# Patient Record
Sex: MALE | NOT HISPANIC OR LATINO | Employment: FULL TIME | URBAN - METROPOLITAN AREA
[De-identification: names, ages, dates, MRNs, and addresses within clinical notes are randomized per-mention and may not be internally consistent; named-entity substitution may affect disease eponyms.]

---

## 2020-04-20 ENCOUNTER — TELEPHONE (OUTPATIENT)
Dept: OTHER | Facility: OUTPATIENT CENTER | Age: 25
End: 2020-04-20

## 2020-04-20 NOTE — TELEPHONE ENCOUNTER
[facility] Telephone Intake    Date:  2020  Client Name:  Kd Slater  Preferred Name: Jef    MRN:  [id number]   :  1995       Age:  24 year old     Presenting Problem / Reason for Assessment   (Clinical History &Symptoms):     Jef states he would like to work with a therapist to discuss gender dysphoria and gender exploration.     Suggested Program:  TG    Length of time experiencing Symptoms:  2-3 years     Seen Other Providers (if so, where): No  M.D. :    Therapist:    Psychiatrist:      Is presenting concern primarily sexual or mental health:  mental    Medications:     Prescribing Physician:      Diagnosis (if known):     Referral Source:  Online    Follow Up:    Insurance Benefits to be evaluated.  Note will be entered when validated.    Patient wishes to be contacted regarding Insurance benefits:  Yes    Please Verify Registration    Please send Welcome Packet and document date sent.

## 2020-04-21 NOTE — TELEPHONE ENCOUNTER
.Per: AUTOLINE w/ [employer]  Copay$ 0   Ded$ 0   Met$ 0   Coins 20%    Out of Pocket Max$ 6,970 ; Met$ 0     Psych testing require auth (13146/16894)? NO  Extended therapy require auth (44481)?  no    Exclusions:   Family Therapy (21108/25714)  NO    Marriage couple counseling  YES   Transgender/Gender Dysphoria no   CSB no   Sexual dysfunction no    Patient Contacted about benefits:  SPOKE TO PATIENT RE:INSU  Date contacted: 4/21/2020

## 2020-04-30 ENCOUNTER — VIRTUAL VISIT (OUTPATIENT)
Dept: OTHER | Facility: OUTPATIENT CENTER | Age: 25
End: 2020-04-30
Payer: COMMERCIAL

## 2020-04-30 DIAGNOSIS — F64.0 GENDER DYSPHORIA IN ADOLESCENT AND ADULT: Primary | ICD-10-CM

## 2020-04-30 NOTE — PROGRESS NOTES
Video start time: 3:05PM  Video end time: 3:55PM    Telemedicine Visit: The patient's condition can be safely assessed and treated via synchronous audio and visual telemedicine encounter.      Reason for Telemedicine Visit: Services only offered telehealth    Originating Site (Patient Location): Patient's home    Distant Site (Provider Location): Provider Remote Setting    Consent:  The patient/guardian has verbally consented to: the potential risks and benefits of telemedicine (video visit) versus in person care; bill my insurance or make self-payment for services provided; and responsibility for payment of non-covered services.     Mode of Communication:  Video Conference via [employer]    As the provider I attest to compliance with applicable laws and regulations related to telemedicine.        [facility]  Program in Human Sexuality  Department of Family Medicine & Community Health  [facility]   [address]               [place] [address]  Phone: [phone]  Fax: [phone]  [email or web address]    Transgender Diagnostic (TG) Diagnostic Assessment Interview  It is not required that you ask all questions or problems. Prioritize and set the most clinically relevant information in the time available.    Date of Service: 4/30/20  Client Name: Kd Slater  YOB: 1995  24 year old  MRN:  [id number]  Gender/Gender Identity: female/unsure  Treating Provider: Dwight Pete, Ph.D.,   Program: TG   Type of Session: Assessment  Present in Session: Dwight Fuchs  Number of Minutes:  50                       In most instances, Dx should be completed in 1 session with use of Comprehensive Intake Form    Ethnicity: multi-racial    Any relevant cultural/Yazidism issues? Client identified as  and noted the ways in which they have always felt marginalized or as other throughout their life.    Chief Complaint/Presenting  Problem and Goals:  Client reported that they are coming to counseling to learn more about themselves and explore if gender transitioning is right for them.      ________________________________________________________________    Transgender Health Services  Program-Specific Information    History of gender dysphoria   Has been working hard over the last five years to take care of themselves; first long-term relationship that has helped them start to learn about who they are and to recognize the dissonance they feel. Client reported that they have always felt low levels of incongruence, but they have been able to find some comfort in expressing themselves differenty, though at times that has felt like an overexaggeration.    Client described how they have never seen themselves as a boy and have always felt like an alien.    Body Image/Anatomical dysphoria:  Client reported that they are not sure about their experience of dysphoria at this time other than that it is clear to them that they do not feel like a man. Client is interested in further exploring their experience as it relates to their body and identity.    Social Supports:   Ex-girlfriend has been a major support as well as roommate, friends from high school. Client also discussed how happy they are in their job and that their coworkers have been very supportive.    Relevant Sexuality Information:  Client reported that they are not interested in dating right now because they want to understand themselves better and that is hard to do in a relationship. Client stated that they feel more comfortable in their sexuality now in a way that they were not for a long time. Client said that they now realize that they are more attracted to men and not attracted to women.    _________________________________________________________________________      Mental Health History:  Client has had some experiences with a counselor in the past after their father's death that  was helpful; help with making sure they were taking care of themselves (e.g., sleeping, eating, going to work, etc.).     Client said their mental health has been improving recently, and have been able to identify when they are starting to experience depression and being able to help prevent it from worsening. Client denied any suicide ideation or intent.      Substance Use:   Very limited alcohol consumption (1-2 drinks once per month), smokes marijuana about twice a week. Client reported that they try hard to ensure that they do not smoke in response to negative moods and only use it socially or for more spiritual experiences. Client drinks one thermos of coffee per day.    Medical History:  Client reported no significant medical history.    Relationships/Social History    Family History [where grew up, moves, parent divorce]:  Dad passed away about a year ago, and client noted that he struggled with alcohol addiction for much of his life. Client reported not having a relationship with him as a result of his addiction and related behaviors.    Client reported having a more difficult relationship with his mom until more recently. Client said that they now have a very close and strong relationship. Mom lives in [place], [place] and they have a chance to see her several times a year.  Brother lives down the street in [place] and client said that he is pretty close to him.      Educational History  Client completed two years of college before leaving.      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 Client said that they work with people they really enjoy and that it pays the bills.       ____________________________________________________    CONCLUSIONS    Strengths and Liabilities:   Client has a strong network of friends and family members who provide a lot of support to them.  Client is also clearly very insightful and aware of their experience.     Symptoms:  Client experiences ongoing concerns related to understanding their gender and how they can best feel comfortable in themselves. These feelings have persisted since childhood, and are consistent with a diagnosis of gender dysphoria.    Mental Status   Appearance:  No apparent distress, Casually dressed, Adequately groomed and Dressed appropriately for weather  Behavior/relationship to examiner/demeanor:  Cooperative, Engaged and Pleasant  Orientation: Oriented to person, place, time and situation  Speech Rate:  Normal  Speech Spontaneity:  Normal  Mood:  calm, friendly and comfortable  Affect:  Appropriate/mood-congruent  Thought Process (Associations):  Logical, Linear and Goal directed  Thought Content:  no evidence of suicidal or homicidal ideation  Abnormal Perception:  None  Attention/Concentration:  Normal  Language:  Intact  Insight:  Good  Judgment:  Good        DSM-5 Diagnosis:  F64.0 - gender dysphoria    Conclusions/Recommendations/Initial Treatment Goals: (The treatment plan should be developed from the assessment and in the chart the 1st session following the completion of the assessment.  It needs to be reviewed and signed by the patient and therapist).    The client is a 24 year old multi-racial person assigned male at birth who identifies as female or unsure at this time. Based on the client's current report of symptoms, client meets criteria for gender dysphoria. The client's mental health concerns affect client's ability to function socially and have been causing clinically significant distress. The client reports daily marijuana use and very limited alcohol use. The patient is also struggling with some complicated grief as a result of their father's recent death from addiction. Client reports/denies safety concerns. Based on the client's reported symptoms and impact on functioning, the plan for the patient  is:  1. Start supportive individual/family therapy with a provider specialized in gender. Therapy should focus on helping client understand themselves better.  2. Continue care/Establish care with a psychiatrist for medication management.   3. Consider ways of increasing client's social support through meeting other trans individuals.  4. Continue to assess the impact of client's family and relational patterns on their current well-being.     Dwight Pete, Ph.D., LP

## 2020-05-06 ENCOUNTER — VIRTUAL VISIT (OUTPATIENT)
Dept: OTHER | Facility: OUTPATIENT CENTER | Age: 25
End: 2020-05-06
Payer: COMMERCIAL

## 2020-05-06 DIAGNOSIS — F64.0 GENDER DYSPHORIA IN ADOLESCENT AND ADULT: Primary | ICD-10-CM

## 2020-05-06 NOTE — PROGRESS NOTES
TREATMENT PLAN    Date of Treatment Plan 2020  Name: Kd Slater  : 1995  Medical Record Number: [id number]  Treating Provider: Dwight Pete, Ph.D., LP  Type of Session: Individual  Present in Session: Dwight Fuchs      Current Status:    Depression/Mood:  Increase or Decrease in Appetite/Weight  Decreased Self-Esteem  Hopelessness/Helplessness  Dysphoria    Anxiety/Panic:  Worry  Intrusive Thoughts    Thought:   Reports no problems or symptoms at this time    Sensorium:  Reports no problems or symptoms at this time    Behavior/Health:  Reports no problems or symptoms at this time    Chemical Use:  Denies both Alcohol and Drug Abuse    Sexual Problems:  Reports no problems or symptoms at this time    Suicide Risk Assessment:  Assessed Level of Immediate Risk:  NO  Ideation:  NO  Plan:  NO  Means:  NOT APPLICABLE  Intent:  NO    Homicide Risk Assessment:  Assessed Level of Immediate Risk:  NO  Ideation:  NO  Plan:  NO  Means:  NOT APPLICABLE  Intent:  NO    Impact of Symptoms on Function:  Decreased Social/Family Function  Decreased Work Function    DSM-5 Diagnoses:   F64.0    Screening Questionnaires:  Please indicate whether the following screening questionnaires have been completed:  CAGE: No  PHQ-9: No    MICHELLE-7: No  Safety Screening: No  WHODAS: No  ** Screening questionnaires were not sent to client **    Problem(s):  1. Decreasing shame and self-pathogolization, self-loathing  2. Increasing self-acceptance  3. Increasing ability to talk abut this with family  4. Increasing daily coping strategies for managing moments of lower mood    Short-Long Term Goals  Decrease Symptoms  Increase Coping  Improve Communication  Enhance Relationships    Interventions  [facility]  Cognitive-Behavioral Therapy  Psychodynamic Therapy    Expected Outcomes and Prognosis  Return to normal functioning    Anticipated Treatment Duration:  One year    Frequency of Sessions  2 x / Month    Progress  Update (for Plan Update Only)  NA:  1st Treatment Planning    Care Coordination: No    Consent to Treatment:     Patient participated in this treatment planning process and indicated verbal agreement with the above treatment plan.    Patient Signature: [not signed]  Date: 5/06/20    If patient doesn't sign, indicate why: Telehealth services    Provider Signature: Dwight Pete, Ph.D.,   Date: 5/06/20

## 2020-05-06 NOTE — PROGRESS NOTES
Video start time: 9:04AM  Video end time: 9:55AM    Telemedicine Visit: The patient's condition can be safely assessed and treated via synchronous audio and visual telemedicine encounter.      Reason for Telemedicine Visit: Services only offered telehealth    Originating Site (Patient Location): Patient's home    Distant Site (Provider Location): Provider Remote Setting    Consent:  The patient/guardian has verbally consented to: the potential risks and benefits of telemedicine (video visit) versus in person care; bill my insurance or make self-payment for services provided; and responsibility for payment of non-covered services.     Mode of Communication:  Video Conference via [employer]    As the provider I attest to compliance with applicable laws and regulations related to telemedicine.      [facility] -  Case Progress Note    Date of Service: 20   Name: Kd Slater  : 1995  Medical Record Number: [id number]  Treating Provider: Dwight Pete, Ph.D.,   Type of Session: Individual  Present in Session: Dwight Fuchs  Number of Minutes:  51    Current Symptoms/Status:  Client is experiencing ongoing concerns related to gender identity exploration and dysphoria.     Progress Toward Treatment Goals:   We completed a treatment plan together and Client outlined their goals for counseling (see treatment plan). We focused on Client's first identified goal today of shifting what they notice as a dynamic for themselves of pathologizing their gender. Client described it as a feeling of having something be wrong about them. Client said that they also experience feeling like they deserve this bad thing that has happened to them. Client identified this feeling as resulting from messages about trans people in society, as well as cultural norms that exist within their family, particularly related to cultural aspects of Catholicism (e.g., feeling like they have some kind of innate sin or  wrongness about themselves).     Intervention: Modality and Description:  Cognitive strategies related to identifying cognitive frameworks for understanding gender were discussed, with us discussing dominant frameworks that are pervasive in society: sex is only male/female, gender identity that differs is wrong. We discussed ways Client could identify that framework when they notice it appearing for themselves and ways Client could work to shift that and adopt their own framework to work from.     Response to Intervention:  Client was open and engaged and responded readily to wanting to understand where the feeling they get regarding their gender being wrong comes from. Client was excited for the assignment they were given this week (see below).    Assignment:  Client was asked to reflect on the following question: What about your gender makes you happy?    DSM-5 Diagnoses:  F64.0    Plan/Need for Future Services:  Return for therapy in 1-2 weeks to treat diagnosed problems.      Dwight Pete, Ph.D., LP

## 2020-05-14 ENCOUNTER — VIRTUAL VISIT (OUTPATIENT)
Dept: OTHER | Facility: OUTPATIENT CENTER | Age: 25
End: 2020-05-14
Payer: COMMERCIAL

## 2020-05-14 DIAGNOSIS — F64.0 GENDER DYSPHORIA IN ADOLESCENT AND ADULT: Primary | ICD-10-CM

## 2020-05-14 NOTE — PROGRESS NOTES
"Video start time: 1:04PM  Video end time: 1:58PM    Telemedicine Visit: The patient's condition can be safely assessed and treated via synchronous audio and visual telemedicine encounter.      Reason for Telemedicine Visit: Services only offered telehealth    Originating Site (Patient Location): Patient's home    Distant Site (Provider Location): [facility]    Consent:  The patient/guardian has verbally consented to: the potential risks and benefits of telemedicine (video visit) versus in person care; bill my insurance or make self-payment for services provided; and responsibility for payment of non-covered services.     Mode of Communication:  Video Conference via [employer]    As the provider I attest to compliance with applicable laws and regulations related to telemedicine.        [facility] -  Case Progress Note    Date of Service: 20   Name: Kd Slater  : 1995  Medical Record Number: [id number]  Treating Provider: Dwight Pete, Ph.D.,   Type of Session: Individual  Present in Session: Dwight Fuchs  Number of Minutes:  54    Current Symptoms/Status:  Client reported ongoing gender dysphoria. Client also reported some heightened mood-related concerns and increased rumination over the past week.    Progress Toward Treatment Goals:   Client reported that they were initially excited to think about the questions that were posed during last session. However, Client noted that over the space of a few days when they were unable to \"make headway\" on answers to those questions, they found themselves very discouraged and said that they experienced a feeling of wanting to \"dig in their heels\" and not keep thinking about the questions. This led to a discussion where Client identified this is a common experience for them in other domains of their life and that they often become discouraged when they feel as though they aren't making more " progress toward accomplishing their goals.    We discussed ways Client could consider that their experience of not making headway had more to do with an intervention that they were not yet able to interact with, rather than an indication of their skills or abilities. Client expressed a feeling of unfairness in needing to face questions about how to be in the world as someone with a different gender identity. We discussed how racism and white supremacy and sexism and patriarchal systems operate in this society and the privilege that some have in not needing to ask those same questions or to reject the premise that these dynamics impact people and institutions.     We also discussed me leaving [facility] at the end of June.    Intervention: Modality and Description:  Cognitive strategies for decreasing rumination were discussed. We also discussed larger systems of racism/white supremacy and sexism/patriarchy as a way for Client to identify how to understand their experiences at this time.    Response to Intervention:  Client was open and engaged throughout, and appeared to feel less demoralized by the end of session.     Assignment:  None given at this time.    DSM-5 Diagnoses:  F64.0    Plan/Need for Future Services:  Return for therapy in 1-2 weeks to treat diagnosed problems.        Dwight Pete, Ph.D., LP

## 2020-05-21 ENCOUNTER — VIRTUAL VISIT (OUTPATIENT)
Dept: OTHER | Facility: OUTPATIENT CENTER | Age: 25
End: 2020-05-21
Payer: COMMERCIAL

## 2020-05-21 DIAGNOSIS — F64.0 GENDER DYSPHORIA IN ADOLESCENT AND ADULT: Primary | ICD-10-CM

## 2020-05-22 NOTE — PROGRESS NOTES
Video start time: 1:02PM  Video end time: 1:52PM    Telemedicine Visit: The patient's condition can be safely assessed and treated via synchronous audio and visual telemedicine encounter.      Reason for Telemedicine Visit: Patient has requested telehealth visit    Originating Site (Patient Location): Patient's home    Distant Site (Provider Location): [facility]    Consent:  The patient/guardian has verbally consented to: the potential risks and benefits of telemedicine (video visit) versus in person care; bill my insurance or make self-payment for services provided; and responsibility for payment of non-covered services.     Mode of Communication:  Video Conference via [employer]    As the provider I attest to compliance with applicable laws and regulations related to telemedicine.      [facility] -  Case Progress Note    Date of Service: 20   Name: Kd Slater  : 1995  Medical Record Number: [id number]  Treating Provider: Dwight Pete, Ph.D.,   Type of Session: Individual  Present in Session: Dwight Fuchs  Number of Minutes:  50    Current Symptoms/Status:  Client reported some ongoing gender dysphoria. Client reported less rumination and less distress this week compared to last.    Progress Toward Treatment Goals:   Client reported that they were able to stop ruminating on some of the same thoughts that they had been before. Client stated that recognized that they have feelings of guilt related to focusing on themselves, saying that they have an implicit association of focusing on themselves as being selfish. Client said that in realizing this, they have been able to feel more at peace about focusing more on their gender and other aspects of their life.    Intervention: Modality and Description:  Strategies for helping Client identify where early messages around focusing on themselves were employed. Client identified being capable  of helping others manage their emotions, particularly family members and so it has been hard to feel ok with taking time away for themselves. Client said that after their father  and after their brother was admitted to an inpatient recovery center, they realized how important it was for them to take time for themselves.    Response to Intervention:  Client was open and engaged throughout, and had a lot of insight into their own experience.    Assignment:  None given at this time.    DSM-5 Diagnoses:  F64.0    Plan/Need for Future Services:  Return for therapy in 1-2 weeks to treat diagnosed problems.        Dwight Pete, Ph.D., LP

## 2020-05-28 ENCOUNTER — VIRTUAL VISIT (OUTPATIENT)
Dept: OTHER | Facility: OUTPATIENT CENTER | Age: 25
End: 2020-05-28
Payer: COMMERCIAL

## 2020-05-28 DIAGNOSIS — F64.0 GENDER DYSPHORIA IN ADOLESCENT AND ADULT: Primary | ICD-10-CM

## 2020-05-29 NOTE — PROGRESS NOTES
Video start time: 1:03PM  Video end time: 1:54PM    Telemedicine Visit: The patient's condition can be safely assessed and treated via synchronous audio and visual telemedicine encounter.      Reason for Telemedicine Visit: Services only offered telehealth    Originating Site (Patient Location): Patient's home    Distant Site (Provider Location): [facility]: [facility]    Consent:  The patient/guardian has verbally consented to: the potential risks and benefits of telemedicine (video visit) versus in person care; bill my insurance or make self-payment for services provided; and responsibility for payment of non-covered services.     Mode of Communication:  Video Conference via [employer]    As the provider I attest to compliance with applicable laws and regulations related to telemedicine.      [facility] -  Case Progress Note    Date of Service: 20   Name: Kd Slater  : 1995  Medical Record Number: [id number]  Treating Provider: Dwight Pete, Ph.D.,   Type of Session: Individual  Present in Session: Dwight Fuchs  Number of Minutes:  51    Current Symptoms/Status:  Client reported ongoing concerns and questions related to their gender and general mental health.    Progress Toward Treatment Goals:   Client reported that in the past week they found themselves continuing to ask themselves important questions about their mental health and their gender but eventually it began to feel overwhelming and was bringing down their mood. Client described compartmentalizing these questions and feelings, and instead concentrating on doing what they needed to in starting back up with work.    Intervention: Modality and Description:  We identified together Client's healthy ability to respond to what they needed in the moment by compartmentalizing. We also employed some guided imagery for healthy compartmentalization in the future, so that Client could feel as  though they had an increased sense of control over how and when they compartmentalize.     Response to Intervention:  Client identified a visual image of small red jewelry box as the container that they would want to use for compartmentalizing. Client noted that if they put it away, they might never take it out, so it might be something they keep on their desk, just partially open so that they know it's there when they feel ready to take it out and look at it again. Client was also able to readily respond to the idea that in compartmentalizing for themselves, they were still taking care of themselves and pushing forward with their goals by attending more to self-care.    Assignment:  None given at this time.    DSM-5 Diagnoses:  F64.0    Plan/Need for Future Services:  Return for therapy in 1-2 weeks to treat diagnosed problems.      Dwight Pete, Ph.D., LP

## 2020-06-04 ENCOUNTER — VIRTUAL VISIT (OUTPATIENT)
Dept: OTHER | Facility: OUTPATIENT CENTER | Age: 25
End: 2020-06-04
Payer: COMMERCIAL

## 2020-06-04 DIAGNOSIS — F64.0 GENDER DYSPHORIA IN ADOLESCENT AND ADULT: Primary | ICD-10-CM

## 2020-06-05 NOTE — PROGRESS NOTES
Video start time: 1:32PM  Video end time: 2:27PM    Telemedicine Visit: The patient's condition can be safely assessed and treated via synchronous audio and visual telemedicine encounter.      Reason for Telemedicine Visit: Services only offered telehealth    Originating Site (Patient Location): Patient's home    Distant Site (Provider Location): [facility]    Consent:  The patient/guardian has verbally consented to: the potential risks and benefits of telemedicine (video visit) versus in person care; bill my insurance or make self-payment for services provided; and responsibility for payment of non-covered services.     Mode of Communication:  Video Conference via [employer]    As the provider I attest to compliance with applicable laws and regulations related to telemedicine.      [facility] Sexual Health -  Case Progress Note    Date of Service: 20   Name: Kd Slater  : 1995  Medical Record Number: [id number]  Treating Provider: Dwight Pete, Ph.D.,   Type of Session: Individual  Present in Session: Dwight Fuchs  Number of Minutes:  55    Current Symptoms/Status:  Client continues to experience ongoing questions and concerns related to their gender. Client has also been experiencing an increase in overall distress from the previous week as a result of Zbigniew Aguirre's killing and protests.    Progress Toward Treatment Goals:   Client reported that they joined several protests over the past week and that it brought up a lot of emotions for them: excitement (about joining into a larger effort to call for reform), sorrow, pain, exhaustion. Client also discussed their efforts in the past to be engaged in the community, but this experience has helped them realize that they want to find a way to do that in a more impactful and longer-term way. Client also described feeling a need to take a step back from protesting to rest and recover, though described  "that as bringing up a lot of guilt for themselves.    Intervention: Modality and Description:  We discussed ways Client could shift the sense of guilt they were experiencing in needing to rest after an intense week. We also unpacked messages regarding the what it means to \"rest\", and how societal messages about resting for POC are rooted in white supremacy.     Response to Intervention:  Client was able to connect with the importance of resting for themselves, giving space for themselves to take care of themselves and give themselves what they need. In the moment, Client appeared to really connect with a feeling that they matter as a person and are worthy of having their needs met.    Assignment:  None given at this time.    DSM-5 Diagnoses:  F64.0    Plan/Need for Future Services:  Return for therapy in 1-2 weeks to treat diagnosed problems.      Dwight Pete, Ph.D., LP    "

## 2020-06-11 ENCOUNTER — VIRTUAL VISIT (OUTPATIENT)
Dept: OTHER | Facility: OUTPATIENT CENTER | Age: 25
End: 2020-06-11
Payer: COMMERCIAL

## 2020-06-11 DIAGNOSIS — F64.0 GENDER DYSPHORIA IN ADOLESCENT AND ADULT: Primary | ICD-10-CM

## 2020-06-11 NOTE — PROGRESS NOTES
Video start time: 1:02PM  Video end time: 1:55PM    Telemedicine Visit: The patient's condition can be safely assessed and treated via synchronous audio and visual telemedicine encounter.      Reason for Telemedicine Visit: Services only offered telehealth    Originating Site (Patient Location): Patient's home    Distant Site (Provider Location): [facility]: center for sexual [facility]    Consent:  The patient/guardian has verbally consented to: the potential risks and benefits of telemedicine (video visit) versus in person care; bill my insurance or make self-payment for services provided; and responsibility for payment of non-covered services.     Mode of Communication:  Video Conference via [employer]    As the provider I attest to compliance with applicable laws and regulations related to telemedicine.      [facility] Sexual Health -  Case Progress Note    Date of Service: 20   Name: Kd Slater  : 1995  Medical Record Number: [id number]  Treating Provider: Dwight Pete, Ph.D.,   Type of Session: Individual  Present in Session: Dwight Fuhcs  Number of Minutes:  53    Current Symptoms/Status:  Client reported ongoing concerns related to gender. Client also expressed some questions regarding understanding their own needs and desires.    Progress Toward Treatment Goals:   Client reported that they have gone back to work full time in the last week and that it has been a difficult transition back. Client said that they feel as though they have experienced a lot of highs and lows, but that a lot of their highs have been learning from their lows. Client identified feeling progress from last week in recognizing that they have needs and desires and that they have found themselves really thinking about what they want in the long-term for themselves beyond just surviving.     Intervention: Modality and Description:  We discussed the concept of thriving and Client identified that they  "have long held a perspective on thriving as being defined externally in notions about success. We discussed the ways client is rewriting for themselves what success means and how they are defining that from within, rather than internalizing others' definitions.    Response to Intervention:  Client identified that the process of learning and growing and changing is what they consider part of their success. Client expressed wanting to continue to find that from within.    Assignment:  None given at this time.    DSM-5 Diagnoses:  F64.0    Plan/Need for Future Services:  Return for therapy in 1 week to treat diagnosed problems.  Discuss with client who their \"role models\" are for success. What are the ingredients for success.    Dwight Pete, Ph.D., LP    "

## 2020-06-25 ENCOUNTER — VIRTUAL VISIT (OUTPATIENT)
Dept: OTHER | Facility: OUTPATIENT CENTER | Age: 25
End: 2020-06-25
Payer: COMMERCIAL

## 2020-06-25 DIAGNOSIS — F64.0 GENDER DYSPHORIA IN ADOLESCENT AND ADULT: Primary | ICD-10-CM

## 2020-06-29 NOTE — PROGRESS NOTES
Video start time: 1:03PM  Video end time: 1:58PM    Telemedicine Visit: The patient's condition can be safely assessed and treated via synchronous audio and visual telemedicine encounter.      Reason for Telemedicine Visit: Services only offered telehealth    Originating Site (Patient Location): Patient's home    Distant Site (Provider Location): [facility]: [facility]    Consent:  The patient/guardian has verbally consented to: the potential risks and benefits of telemedicine (video visit) versus in person care; bill my insurance or make self-payment for services provided; and responsibility for payment of non-covered services.     Mode of Communication:  Video Conference via [employer]    As the provider I attest to compliance with applicable laws and regulations related to telemedicine.      [facility] Sexual Health -  Case Progress Note    Date of Service: 20   Name: Kd Slater  : 1995  Medical Record Number: [id number]  Treating Provider: Dwight Pete, Ph.D.,   Type of Session: Individual  Present in Session: Dwight Fuchs  Number of Minutes:  55    Current Symptoms/Status:  Client reported ongoing concerns related to gender and to general mood.    Progress Toward Treatment Goals:   Client reported that they have not spent a lot of time thinking about some of the bigger questions they have been wrestling with as recently. Client noted that it has been a helpful kind of break for themselves. Client also noted that they have experienced a profound shift in how they are being active in taking care of themselves right now, which Client has noted is due to feeling like they are worth taking care of. Client expressed this as a new feeling for themselves, and how meaningful it is to connect with it.     Intervention: Modality and Description:  We also discussed underlying messages Client has internalized over time about their work or their efforts not being  "enough. We discussed what it would be like for Client to take a moment to celebrate \"small\" steps they take for themselves, and Client identified what a big shift that represents for themselves. Client noted that even when they were getting straight As in school, it didn't feel like it was worth celebrating because they felt like it wasn't a big enough success for themselves.    Response to Intervention:  Client was able to identify during session that they want to be able to celebrate the successes they experience and to see themselves in a different light. Client said they will set aside some time for themselves to celebrate any steps they take toward growth for themselves because they believe it is important to see themselves as worth celebrating.    Assignment:  None given at this time.    DSM-5 Diagnoses:  F64.0    Plan/Need for Future Services:  Return for therapy in 1-2 weeks to treat diagnosed problems.      Dwight Pete, Ph.D., LP    "

## 2020-07-02 ENCOUNTER — VIRTUAL VISIT (OUTPATIENT)
Dept: OTHER | Facility: OUTPATIENT CENTER | Age: 25
End: 2020-07-02
Payer: COMMERCIAL

## 2020-07-02 DIAGNOSIS — F64.0 GENDER DYSPHORIA IN ADOLESCENT AND ADULT: Primary | ICD-10-CM

## 2020-07-02 NOTE — PROGRESS NOTES
Video start time: 1:03PM  Video end time: 1:58PM    Telemedicine Visit: The patient's condition can be safely assessed and treated via synchronous audio and visual telemedicine encounter.      Reason for Telemedicine Visit: Services only offered telehealth    Originating Site (Patient Location): Patient's home    Distant Site (Provider Location): [facility]: [facility]    Consent:  The patient/guardian has verbally consented to: the potential risks and benefits of telemedicine (video visit) versus in person care; bill my insurance or make self-payment for services provided; and responsibility for payment of non-covered services.     Mode of Communication:  Video Conference via [employer]    As the provider I attest to compliance with applicable laws and regulations related to telemedicine.      [facility] Sexual Health -  Case Progress Note    Date of Service: 20   Name: Kd Slater  : 1995  Medical Record Number: [id number]  Treating Provider:Tenisha Stewart PsyD, LMFT  Type of Session: Individual  Present in Session: Dwight Fuchs  Number of Minutes:  55    Current Symptoms/Status:  Client reported ongoing concerns related to gender and to general mood.    Progress Toward Treatment Goals:   Client reported that they have not spent a lot of time thinking about some of the bigger questions they have been wrestling with as recently. Client noted that it has been a helpful kind of break for themselves. Client also noted that they have experienced a profound shift in how they are being active in taking care of themselves right now, which Client has noted is due to feeling like they are worth taking care of. Client expressed this as a new feeling for themselves, and how meaningful it is to connect with it.     Intervention: Modality and Description:  Therapist focused on joining and creating an alliance with the client.  Utilized interpersonal, family systems, and narrative  therapy techniques to learn about clients history, presenting concerns, and therapeutic needs.     Response to Intervention:  Client was able to identify during session that they want to be able to celebrate the successes they experience and to see themselves in a different light. Client said they will set aside some time for themselves to celebrate any steps they take toward growth for themselves because they believe it is important to see themselves as worth celebrating.    Assignment:  None given at this time.    DSM-5 Diagnoses:  F64.0    Plan/Need for Future Services:  Return for therapy in 1-2 weeks to treat diagnosed problems.      Tenisha Stewart PsyD, LMFT

## 2020-07-09 ENCOUNTER — VIRTUAL VISIT (OUTPATIENT)
Dept: OTHER | Facility: OUTPATIENT CENTER | Age: 25
End: 2020-07-09
Payer: COMMERCIAL

## 2020-07-09 DIAGNOSIS — F64.0 GENDER DYSPHORIA IN ADOLESCENT AND ADULT: Primary | ICD-10-CM

## 2020-07-09 NOTE — PROGRESS NOTES
"Video start time: 2:34PM  Video end time: 3:25 PM    Telemedicine Visit: The patient's condition can be safely assessed and treated via synchronous audio and visual telemedicine encounter.      Reason for Telemedicine Visit: Services only offered telehealth    Originating Site (Patient Location): Patient's home    Distant Site (Provider Location): [facility]: [facility]    Consent:  The patient/guardian has verbally consented to: the potential risks and benefits of telemedicine (video visit) versus in person care; bill my insurance or make self-payment for services provided; and responsibility for payment of non-covered services.     Mode of Communication:  Video Conference via [employer]    As the provider I attest to compliance with applicable laws and regulations related to telemedicine.      [facility] Sexual Health -  Case Progress Note    Date of Service: 20   Name: Kd Slater  : 1995  Medical Record Number: [id number]  Treating Provider:Tenisha Stewart PsyD, LMFT  Type of Session: Individual  Present in Session: Tenisha Fuchs  Number of Minutes:  51     Current Symptoms/Status:  Client reported ongoing concerns related to gender and to general mood.    Progress Toward Treatment Goals:   Client reported that they have begun to \"like\" themselves and lean into affirming gender exploration. Client expressed this as a new feeling for themselves, and how meaningful it is to connect with it.     Intervention: Modality and Description:  Therapist focused on joining and creating an alliance with the client.Utilized interpersonal, family systems, and narrative therapy techniques to explore clients, history of presenting problem, and current perceived stressors. Continued to explore goals for therapy, including social transition, ways to feel affirmed in gener expression and cope with external stressors.     Sleep, diet, exercise, social engagement, what gets in the way of " "focusing on areas of self-care?   -   Explored impact of Zbigniew Aguirre murder, police brutality, etc... and dismissal of feelings, experiences as mixed-race person and not feeling like they \"are black enough/permission to be black \" to speak out and be active in the cause. Client reports feeling guilty for having opportunities that other black folks don't.     Client reported entire life of feeling like a \"house slave\" so many experiences they are afforded are because they are the \"whitest black person.\"     Response to Intervention:  Engaged and active.    Assignment:  None given at this time.    DSM-5 Diagnoses:  F64.0    Plan/Need for Future Services:  Return for therapy in 1-2 weeks to treat diagnosed problems.      Tenisha Stewart PsyD, LMFT    "

## 2020-07-16 ENCOUNTER — VIRTUAL VISIT (OUTPATIENT)
Dept: OTHER | Facility: OUTPATIENT CENTER | Age: 25
End: 2020-07-16
Payer: COMMERCIAL

## 2020-07-16 DIAGNOSIS — F64.0 GENDER DYSPHORIA IN ADOLESCENT AND ADULT: Primary | ICD-10-CM

## 2020-07-16 NOTE — PROGRESS NOTES
"Video start time: 3:32PM  Video end time: 4:25 PM    Telemedicine Visit: The patient's condition can be safely assessed and treated via synchronous audio and visual telemedicine encounter.      Reason for Telemedicine Visit: Services only offered telehealth    Originating Site (Patient Location): Patient's home    Distant Site (Provider Location): [facility]: [facility]    Consent:  The patient/guardian has verbally consented to: the potential risks and benefits of telemedicine (video visit) versus in person care; bill my insurance or make self-payment for services provided; and responsibility for payment of non-covered services.     Mode of Communication:  Video Conference via [employer]    As the provider I attest to compliance with applicable laws and regulations related to telemedicine.      [facility] Sexual Health -  Case Progress Note    Date of Service: 20   Name: Kd Slater  : 1995  Medical Record Number: [id number]  Treating Provider:Tenisha Stewart PsyD, LMFT  Type of Session: Individual  Present in Session: Tenisha Fuchs  Number of Minutes:  53    Current Symptoms/Status:  Client reported ongoing concerns related to gender and to general mood.    Progress Toward Treatment Goals:   Client reported that they have begun to \"like\" themselves and lean into affirming gender exploration. Client expressed this as a new feeling for themselves, and how meaningful it is to connect with it.     Intervention: Modality and Description:  Therapist focused on joining and creating an alliance with the client.Utilized interpersonal, family systems, and narrative therapy techniques to explore clients, history of presenting problem, and current perceived stressors. Continued to explore goals for therapy, including social transition, ways to feel affirmed in gener expression and cope with external stressors.     Waking up early 5-6am, bad sleep (waking up in physical pain), but " "gained time to ease into the day.     Saw aunts- 2-7 years old lots of anger problems, very traumatic environment couldn't understand (parents divorce, dad in and out of hospital due to drug addiction/mental illness). Conscious decision at age 9 or 10 to \"put bad Kd under the bed.\" Explored client reactions to being consistently told they were \"bad.\"    Response to having negative feelings is physical, or being dismissed or shut down. Comments about food/weight, conscious decision to poke fun at wounds within paternal side of family (bully you based on what's important to you)- vulnerable narcissists, lack self-efficacy.    Brother- very close, very supportive, but may not be aware and open-minded around gender.    - if I am good at something then I can love myself  - talent is its own expectation, I get attention and validation from doing something well.   -Explored roots of self-loathing in family of origin.    Response to Intervention:  Engaged and active.    Assignment:  None given at this time.    DSM-5 Diagnoses:  F64.0    Plan/Need for Future Services:  Return for therapy in 1-2 weeks to treat diagnosed problems.      Tenisha Stewart PsyD, LMFT  "

## 2020-07-23 ENCOUNTER — VIRTUAL VISIT (OUTPATIENT)
Dept: OTHER | Facility: OUTPATIENT CENTER | Age: 25
End: 2020-07-23
Payer: COMMERCIAL

## 2020-07-23 DIAGNOSIS — F64.0 GENDER DYSPHORIA IN ADOLESCENT AND ADULT: Primary | ICD-10-CM

## 2020-07-23 NOTE — PROGRESS NOTES
"Video start time: 3:32PM  Video end time: 4:25 PM    Telemedicine Visit: The patient's condition can be safely assessed and treated via synchronous audio and visual telemedicine encounter.      Reason for Telemedicine Visit: Services only offered telehealth    Originating Site (Patient Location): Patient's home    Distant Site (Provider Location): [facility]: [facility]    Consent:  The patient/guardian has verbally consented to: the potential risks and benefits of telemedicine (video visit) versus in person care; bill my insurance or make self-payment for services provided; and responsibility for payment of non-covered services.     Mode of Communication:  Video Conference via [employer]    As the provider I attest to compliance with applicable laws and regulations related to telemedicine.      [facility] Sexual Health -  Case Progress Note    Date of Service: 20   Name: Kd Slater  : 1995  Medical Record Number: [id number]  Treating Provider:Tenisha Stewart PsyD, LMFT  Type of Session: Individual  Present in Session: Tenisha Fuchs  Number of Minutes:  55    Current Symptoms/Status:  Client reported ongoing concerns related to gender and to general mood.    Progress Toward Treatment Goals:   Client reported that they have begun to \"like\" themselves and lean into affirming gender exploration. Client expressed this as a new feeling for themselves, and how meaningful it is to connect with it.     Intervention: Modality and Description:  Therapist focused on joining and creating an alliance with the client.Utilized interpersonal, family systems, and narrative therapy techniques to explore clients, history of presenting problem, and current perceived stressors. Continued to explore goals for therapy, including social transition, ways to feel affirmed in gener expression and cope with external stressors.     Self-sabotaging, emphasis on gender has been a part of this (overly stressed " and obsessed about it). Client reports that they have a crush on a man (friend Trevon) for the first time and its very exciting.     Client shared impressions of crush, sexual orientation, and decreased stress related to gender. Focused on disconnect between uncertainty and instability and search for understanding what's next.    Response to Intervention:  Engaged and active.    Assignment:  None given at this time.    DSM-5 Diagnoses:  F64.0    Plan/Need for Future Services:  Return for therapy in 1-2 weeks to treat diagnosed problems.      Tenisha Stewart PsyD, LMFT

## 2020-07-30 ENCOUNTER — VIRTUAL VISIT (OUTPATIENT)
Dept: OTHER | Facility: OUTPATIENT CENTER | Age: 25
End: 2020-07-30
Payer: COMMERCIAL

## 2020-07-30 DIAGNOSIS — F64.0 GENDER DYSPHORIA IN ADOLESCENT AND ADULT: Primary | ICD-10-CM

## 2020-07-30 NOTE — PROGRESS NOTES
"Video start time: 3:33PM  Video end time: 4:30 PM    Telemedicine Visit: The patient's condition can be safely assessed and treated via synchronous audio and visual telemedicine encounter.      Reason for Telemedicine Visit: Services only offered telehealth    Originating Site (Patient Location): Patient's home    Distant Site (Provider Location): [facility]: [facility]    Consent:  The patient/guardian has verbally consented to: the potential risks and benefits of telemedicine (video visit) versus in person care; bill my insurance or make self-payment for services provided; and responsibility for payment of non-covered services.     Mode of Communication:  Video Conference via [employer]    As the provider I attest to compliance with applicable laws and regulations related to telemedicine.      [facility] Sexual Health -  Case Progress Note    Date of Service: 20   Name: Kd Slater  : 1995  Medical Record Number: [id number]  Treating Provider:Tenisha Stewart PsyD, LMFT  Type of Session: Individual  Present in Session: Tenisha Fuchs  Number of Minutes:  57    Current Symptoms/Status:  Client reported ongoing concerns related to gender and to general mood.    Progress Toward Treatment Goals:   Client reported that they have begun to \"like\" themselves and lean into affirming gender exploration. Client expressed this as a new feeling for themselves, and how meaningful it is to connect with it.     Intervention: Modality and Description:  Therapist focused on joining and creating an alliance with the client.Utilized interpersonal, family systems, and narrative therapy techniques to explore clients, history of presenting problem, and current perceived stressors. Continued to explore goals for therapy, including social transition, ways to feel affirmed in gener expression and cope with external stressors.     Found framework where gender and self-exploration was less " "stressful, non-binary.    \"because I so badly don't want to be trans.\" self-sabotage, fear \"Don't....  I'm going to get hurt, people are going to hurt me, its stupid, i'm afraid o    I don't want to change my body, I don't want to be treated differently, I don't want to be at risk of being attacked, but I also don't want to not be myself.     Shame. Feel like a bad person for wanting to be a woman. Back to self-sabotage. Wants to be seen as a caring loving person, got to a point where they wanted to go on hormones.    Explored clients foray into makeup, trying things and embracing it as a hobby, processed feelings of being attuned to self with makeup on.    Friend who's parents are very transphobic, friend is transgender.     Response to Intervention:  Engaged and active.    Assignment:  None given at this time.    DSM-5 Diagnoses:  F64.0    Plan/Need for Future Services:  Return for therapy in 1-2 weeks to treat diagnosed problems.      Tenisha Stewart PsyD, LMFT  "

## 2020-08-06 ENCOUNTER — VIRTUAL VISIT (OUTPATIENT)
Dept: OTHER | Facility: OUTPATIENT CENTER | Age: 25
End: 2020-08-06
Payer: COMMERCIAL

## 2020-08-06 DIAGNOSIS — F64.0 GENDER DYSPHORIA IN ADOLESCENT AND ADULT: Primary | ICD-10-CM

## 2020-08-06 NOTE — PROGRESS NOTES
"Video start time: 3:33PM  Video end time: 4:28PM    Telemedicine Visit: The patient's condition can be safely assessed and treated via synchronous audio and visual telemedicine encounter.      Reason for Telemedicine Visit: Services only offered telehealth    Originating Site (Patient Location): Patient's home    Distant Site (Provider Location): [facility]    Consent:  The patient/guardian has verbally consented to: the potential risks and benefits of telemedicine (video visit) versus in person care; bill my insurance or make self-payment for services provided; and responsibility for payment of non-covered services.     Mode of Communication:  Video Conference via Doxy    As the provider I attest to compliance with applicable laws and regulations related to telemedicine.      [facility] -  Case Progress Note    Date of Service: 20   Name: Kd Slater  : 1995  Medical Record Number: [id number]  Treating Provider:Tenisha Stewart PsyD, LMFT  Type of Session: Individual  Present in Session: Tenisha Fuchs  Number of Minutes:  55    Current Symptoms/Status:  Client reported ongoing concerns related to gender and to general mood.    Progress Toward Treatment Goals:   Client reported that they have begun to \"like\" themselves and lean into affirming gender exploration. Client expressed this as a new feeling for themselves, and how meaningful it is to connect with it.     Intervention: Modality and Description:  Therapist focused on joining and creating an alliance with the client.Utilized interpersonal, family systems, and narrative therapy techniques to explore clients, history of presenting problem, and current perceived stressors. Continued to explore goals for therapy, including social transition, ways to feel affirmed in gener expression and cope with external stressors.         Response to Intervention  Continued to focus on anxiety- don't let people " "get too close, don't be completely myself, I would get hurt. Therapist reflected re-occuring theme of fear of being hurt.     8th grade, cut dad off, not going to have relationship (view of people are disposable). Client reported that at the time they didn't see other options- \"like the .\" Explored client's fathers' cycle of alcoholism and impact of inconsistency on childhood and learned messaging around fear, blame.     Assignment:  None given at this time.    DSM-5 Diagnoses:  F64.0    Plan/Need for Future Services:  Return for therapy in 1-2 weeks to treat diagnosed problems.      Tenisha Stewart PsyD, LMFT  "

## 2020-08-12 ENCOUNTER — VIRTUAL VISIT (OUTPATIENT)
Dept: OTHER | Facility: OUTPATIENT CENTER | Age: 25
End: 2020-08-12
Payer: COMMERCIAL

## 2020-08-12 DIAGNOSIS — F64.0 GENDER DYSPHORIA IN ADOLESCENT AND ADULT: Primary | ICD-10-CM

## 2020-08-12 NOTE — PROGRESS NOTES
"Video start time: 3:03PM  Video end time: 4:00PM    Telemedicine Visit: The patient's condition can be safely assessed and treated via synchronous audio and visual telemedicine encounter.      Reason for Telemedicine Visit: Services only offered telehealth    Originating Site (Patient Location): Patient's home    Distant Site (Provider Location): [facility]: [facility]    Consent:  The patient/guardian has verbally consented to: the potential risks and benefits of telemedicine (video visit) versus in person care; bill my insurance or make self-payment for services provided; and responsibility for payment of non-covered services.     Mode of Communication:  Video Conference via Thrive[employer]    As the provider I attest to compliance with applicable laws and regulations related to telemedicine.      [facility] -  Case Progress Note    Date of Service: 20   Name: Kd Slater  : 1995  Medical Record Number: [id number]  Treating Provider:Tenisha Stewart PsyD, LMFT  Type of Session: Individual  Present in Session: Tenisha Fuchs  Number of Minutes:  57    Current Symptoms/Status:  Client reported ongoing concerns related to gender and to general mood.    Progress Toward Treatment Goals:   Client reported that they have begun to \"like\" themselves and lean into affirming gender exploration. Client expressed this as a new feeling for themselves, and how meaningful it is to connect with it.     Intervention: Modality and Description:  Therapist focused on joining and creating an alliance with the client.Utilized interpersonal, family systems, and narrative therapy techniques to explore clients, history of presenting problem, and current perceived stressors. Continued to explore goals for therapy, including social transition, ways to feel affirmed in gener expression and cope with external stressors.     Explored client experience of feeling ashamed or humiliated by body, " including male genitalia, how client would want to engage in sex is incongruent because of genitalia.    Explored difference between body image concerns and anatomical dysphoria, fears of vanity, narrative of transitioning because they feel ugly. Forehead (big), facial hair, body hair, don't have hips/figure.    Longer hair  Breasts- to a degree  Hips  Not male genitals  Less body hair    Processed line of thought:   Being trans is bad, therefore I am a bad person. (political state, healthcare, erasure, violence toward, microaggressions), and imposter syndrome     Explored variability in gender identity and presentation, self-loathing around being trans or not feeling okay with ways that friends are trans. Discussed logistics of HT and how to begin process. Therapist validated clients gender dysphoria.     Plan/Need for Future Services:    Response to Intervention:  Engaged and active.    Assignment:  None given at this time.    DSM-5 Diagnoses:  F64.0    Return for therapy in 1-2 weeks to treat diagnosed problems.      Tenisha Stewart PsyD, LMFT

## 2020-08-27 ENCOUNTER — VIRTUAL VISIT (OUTPATIENT)
Dept: OTHER | Facility: OUTPATIENT CENTER | Age: 25
End: 2020-08-27
Payer: COMMERCIAL

## 2020-08-27 DIAGNOSIS — F64.0 GENDER DYSPHORIA IN ADOLESCENT AND ADULT: Primary | ICD-10-CM

## 2020-08-27 NOTE — PROGRESS NOTES
"Video start time: 3:33PM  Video end time: 4:30PM    Telemedicine Visit: The patient's condition can be safely assessed and treated via synchronous audio and visual telemedicine encounter.      Reason for Telemedicine Visit: Services only offered telehealth    Originating Site (Patient Location): Patient's home    Distant Site (Provider Location): [facility]: [facility]    Consent:  The patient/guardian has verbally consented to: the potential risks and benefits of telemedicine (video visit) versus in person care; bill my insurance or make self-payment for services provided; and responsibility for payment of non-covered services.     Mode of Communication:  Video Conference via [employer]ource    As the provider I attest to compliance with applicable laws and regulations related to telemedicine.      [facility] -  Case Progress Note    Date of Service: 20   Name: Kd Slater  : 1995  Medical Record Number: [id number]  Treating Provider:Tenisha Stewart PsyD, LMFT  Type of Session: Individual  Present in Session: Tenisha Fuchs  Number of Minutes:  57    Current Symptoms/Status:  Client reported ongoing concerns related to gender and to general mood.    Progress Toward Treatment Goals:   Client reported that they have begun to \"like\" themselves and lean into affirming gender exploration. Client expressed this as a new feeling for themselves, and how meaningful it is to connect with it.     Intervention: Modality and Description:  Therapist focused on joining and creating an alliance with the client.Utilized interpersonal, family systems, and narrative therapy techniques to explore clients, history of presenting problem, and current perceived stressors. Continued to explore dysphoria and ways to feel congruence in experience. Spent session reading through informed consent for feminizing hormones and processing questions/reactions.       Plan/Need for Future " Services:  Refer to Dr. Redmond for hormone consultation.    Response to Intervention:  Engaged and active.    Assignment:  None given at this time.    DSM-5 Diagnoses:  F64.0    Return for therapy in 1-2 weeks to treat diagnosed problems.      Tenisha Stewart PsyD, ALISSAFT

## 2020-08-31 ENCOUNTER — TELEPHONE (OUTPATIENT)
Dept: OTHER | Facility: OUTPATIENT CENTER | Age: 25
End: 2020-08-31

## 2020-09-03 ENCOUNTER — VIRTUAL VISIT (OUTPATIENT)
Dept: OTHER | Facility: OUTPATIENT CENTER | Age: 25
End: 2020-09-03
Payer: COMMERCIAL

## 2020-09-03 DIAGNOSIS — F64.0 GENDER DYSPHORIA IN ADOLESCENT AND ADULT: Primary | ICD-10-CM

## 2020-09-03 NOTE — PROGRESS NOTES
"Video start time: 4:00 PM  Video end time: 5:00    Telemedicine Visit: The patient's condition can be safely assessed and treated via synchronous audio and visual telemedicine encounter.      Reason for Telemedicine Visit: Services only offered telehealth    Originating Site (Patient Location): Patient's home    Distant Site (Provider Location): [facility]: [facility]    Consent:  The patient/guardian has verbally consented to: the potential risks and benefits of telemedicine (video visit) versus in person care; bill my insurance or make self-payment for services provided; and responsibility for payment of non-covered services.     Mode of Communication:  Video Conference via [employer]    As the provider I attest to compliance with applicable laws and regulations related to telemedicine.      [facility] -  Case Progress Note    Date of Service: 20   Name: Kd Slater  : 1995  Medical Record Number: [id number]  Treating Provider:Tenisha Stewart PsyD, LMFT  Type of Session: Individual  Present in Session: Tenisha Fuchs  Number of Minutes:  60    Current Symptoms/Status:  Client reported ongoing concerns related to gender and to general mood.    Progress Toward Treatment Goals:   Client reported that they have begun to \"like\" themselves and lean into affirming gender exploration. Client expressed this as a new feeling for themselves, and how meaningful it is to connect with it.     Scheduled consultation with Dr. Redmond for HRT.    Intervention: Modality and Description:  Therapist focused on joining and creating an alliance with the client.Utilized interpersonal, family systems, and narrative therapy techniques to explore clients, history of presenting problem, and current perceived stressors.     Explored mixed emotions related to moving forward with transition, discomfort, overwhelm. Explored doing versus being.          Response to Intervention:    Harder " "time with weekends, lack of routine, easier to get in head. Explored self-imposed boundaries around \"chill\" and self-care (make-up, painting, making music)    \" I don't want to be trans.\" I am so used to having a protective shell- if people see me for me, terrifying. Afraid to show true self, don't know what would happen.    Part of self that is still disliked- even though I love myself. Ashamed of where I've come from, the things that make me, me.       Plan/Need for Future Services:    Assignment:  None given at this time.    DSM-5 Diagnoses:  F64.0    Return for therapy in 1-2 weeks to treat diagnosed problems.      Tenisha Stewart PsyD, LMFT  "

## 2020-09-10 ENCOUNTER — VIRTUAL VISIT (OUTPATIENT)
Dept: OTHER | Facility: OUTPATIENT CENTER | Age: 25
End: 2020-09-10
Payer: COMMERCIAL

## 2020-09-10 DIAGNOSIS — F64.0 GENDER DYSPHORIA IN ADOLESCENT AND ADULT: Primary | ICD-10-CM

## 2020-09-10 NOTE — PROGRESS NOTES
"Video start time: 4:36 PM  Video end time: 5:30 PM    Telemedicine Visit: The patient's condition can be safely assessed and treated via synchronous audio and visual telemedicine encounter.      Reason for Telemedicine Visit: Services only offered telehealth    Originating Site (Patient Location): Patient's home    Distant Site (Provider Location): [facility]: [facility]    Consent:  The patient/guardian has verbally consented to: the potential risks and benefits of telemedicine (video visit) versus in person care; bill my insurance or make self-payment for services provided; and responsibility for payment of non-covered services.     Mode of Communication:  Video Conference via [employer]    As the provider I attest to compliance with applicable laws and regulations related to telemedicine.      [facility] -  Case Progress Note    Date of Service: 9/10/20   Name: Kd Slater  : 1995  Medical Record Number: [id number]  Treating Provider:Tenisha Stewart PsyD, LMFT  Type of Session: Individual  Present in Session: Tenisha Fuchs  Number of Minutes:  54    Current Symptoms/Status:  Client reported ongoing concerns related to gender and to general mood.    Progress Toward Treatment Goals:   Client reported that they have begun to \"like\" themselves and lean into affirming gender exploration. Client expressed this as a new feeling for themselves, and how meaningful it is to connect with it.     Scheduled consultation with Dr. Redmond for HRT.    Intervention: Modality and Description:  Therapist focused on joining and creating an alliance with the client.Utilized interpersonal, family systems, and narrative therapy techniques to explore clients, history of presenting problem, and current perceived stressors.     Followed up on processing of \"doing vs. Being.\" Processed familial pressures to be smart/intelligent, don't believe that I'm not trans, its something I can " "prove to be untrue.     Explored perceptions around self, relationship, trust, vulnerability.            Response to Intervention:    \"If I keep things academic I don't have to be vulnerable.\"    Vulnerability= fear. I can keep people at a distance/one sided, safer when I don't exist to other people.    If I let people in, they will let me down. Dad couldn't beat his addiction and that means he didn't love me, or he's weak and pathetic.    Its my job to take care of peoples emotions (put bad Jef under the bed).     You made me feel like I'm not good enough without these external things, so I'm going to try to make you feel as bad as I did, so you don't get to know me. Even though I want to be loved.    Plan/Need for Future Services:    Assignment:  None given at this time.    DSM-5 Diagnoses:  F64.0    Return for therapy in 1-2 weeks to treat diagnosed problems.      Tenisha Stewart PsyD, LMFT  "

## 2020-09-14 ENCOUNTER — VIRTUAL VISIT (OUTPATIENT)
Dept: OTHER | Facility: OUTPATIENT CENTER | Age: 25
End: 2020-09-14
Payer: COMMERCIAL

## 2020-09-14 DIAGNOSIS — F64.0 GENDER DYSPHORIA IN ADOLESCENT AND ADULT: Primary | ICD-10-CM

## 2020-09-24 ENCOUNTER — VIRTUAL VISIT (OUTPATIENT)
Dept: OTHER | Facility: OUTPATIENT CENTER | Age: 25
End: 2020-09-24
Payer: COMMERCIAL

## 2020-09-24 DIAGNOSIS — F64.0 GENDER DYSPHORIA IN ADOLESCENT AND ADULT: Primary | ICD-10-CM

## 2020-09-24 NOTE — PROGRESS NOTES
"Video start time: 2:36 PM  Video end time: 3:30 PM    Telemedicine Visit: The patient's condition can be safely assessed and treated via synchronous audio and visual telemedicine encounter.      Reason for Telemedicine Visit: Services only offered telehealth    Originating Site (Patient Location): Patient's home    Distant Site (Provider Location): [facility]: [facility]    Consent:  The patient/guardian has verbally consented to: the potential risks and benefits of telemedicine (video visit) versus in person care; bill my insurance or make self-payment for services provided; and responsibility for payment of non-covered services.     Mode of Communication:  Video Conference via [employer] in [employer]    As the provider I attest to compliance with applicable laws and regulations related to telemedicine.      [facility] -  Case Progress Note    Date of Service: 20   Name: Kd Slater  : 1995  Medical Record Number: [id number]  Treating Provider:Tenisha Stewart PsyD, LMFT  Type of Session: Individual  Present in Session: Tenisha Fuchs  Number of Minutes:  54    Current Symptoms/Status:  Client reported ongoing concerns related to gender and to general mood.    Progress Toward Treatment Goals:   Client reported that they have begun to \"like\" themselves and lean into affirming gender exploration. Client expressed this as a new feeling for themselves, and how meaningful it is to connect with it.     Scheduled consultation with Dr. Redmond for HRT.  Came out to aunt and had a positive response.    Intervention: Modality and Description:  Therapist focused on joining and creating an alliance with the client.Utilized interpersonal, family systems, and narrative therapy techniques to explore clients, history of presenting problem, and current perceived stressors. Explored experience of coming out to aunt, and reflections around self-deception.    Processed continued denial " around gender and concerns of being grouped into being a fetishist, desire to not be seen as a cross-dresser/lump into category.    Hated self so much I projected it on people closest to me. Don't tell people what I need, people are objects.    What I say and how I feel is not enough, I need to prove myself as trans. Discussed results related to REDCAP measures and desire to take measures as therapeutic intervention.       Plan/Need for Future Services:    Assignment:  None given at this time.    DSM-5 Diagnoses:  F64.0    Return for therapy in 1-2 weeks to treat diagnosed problems.      Tenisha Stewart PsyD, LMFT

## 2020-09-30 NOTE — PROGRESS NOTES
"Video start time: 2:32 PM  Video end time: 3:15 PM    Telemedicine Visit: The patient's condition can be safely assessed and treated via synchronous audio and visual telemedicine encounter.      Reason for Telemedicine Visit: Services only offered telehealth    Originating Site (Patient Location): Patient's home    Distant Site (Provider Location): [facility]: [facility]    Consent:  The patient/guardian has verbally consented to: the potential risks and benefits of telemedicine (video visit) versus in person care; bill my insurance or make self-payment for services provided; and responsibility for payment of non-covered services.     Mode of Communication:  Video Conference via [employer]    As the provider I attest to compliance with applicable laws and regulations related to telemedicine.      [facility] -  Case Progress Note    Date of Service: 20   Name: Kd Slater  : 1995  Medical Record Number: [id number]  Treating Provider:Tenisha Stewart PsyD, LMFT  Type of Session: Individual  Present in Session: Tenisha Fuchs  Number of Minutes:  43    Current Symptoms/Status:  Client reported ongoing concerns related to gender and to general mood.    Progress Toward Treatment Goals:   Client reported that they have begun to \"like\" themselves and lean into affirming gender exploration. Client expressed this as a new feeling for themselves, and how meaningful it is to connect with it.     Scheduled consultation with Dr. Redmond for HRT.    Intervention: Modality and Description:  Therapist focused on joining and creating an alliance with the client.Utilized interpersonal, family systems, and narrative therapy techniques to explore clients, history of presenting problem, and current perceived stressors. Explored updates from weekend, questioning around living situation and relationship with ex. Explored patterns of interaction with ex and problematic views around " interpersonal risk v reward      Response to Intervention:        Plan/Need for Future Services:    Assignment:  None given at this time.    DSM-5 Diagnoses:  F64.0    Return for therapy in 1-2 weeks to treat diagnosed problems.      Tenisha Stewart PsyD, LMFT

## 2020-10-08 ENCOUNTER — VIRTUAL VISIT (OUTPATIENT)
Dept: PSYCHOLOGY | Facility: CLINIC | Age: 25
End: 2020-10-08
Payer: COMMERCIAL

## 2020-10-08 DIAGNOSIS — F64.0 GENDER DYSPHORIA IN ADOLESCENT AND ADULT: Primary | ICD-10-CM

## 2020-10-08 PROCEDURE — 99207 PR NO CHARGE LOS: CPT | Performed by: MARRIAGE & FAMILY THERAPIST

## 2020-10-08 PROCEDURE — 90837 PSYTX W PT 60 MINUTES: CPT | Mod: GT | Performed by: MARRIAGE & FAMILY THERAPIST

## 2020-10-08 NOTE — PROGRESS NOTES
"Video start time: 2:36 PM  Video end time: 3:30 PM    Telemedicine Visit: The patient's condition can be safely assessed and treated via synchronous audio and visual telemedicine encounter.      Reason for Telemedicine Visit: Services only offered telehealth    Originating Site (Patient Location): Patient's home    Distant Site (Provider Location): [facility]: [facility]    Consent:  The patient/guardian has verbally consented to: the potential risks and benefits of telemedicine (video visit) versus in person care; bill my insurance or make self-payment for services provided; and responsibility for payment of non-covered services.     Mode of Communication:  Video Conference via [employer]    As the provider I attest to compliance with applicable laws and regulations related to telemedicine.      [facility] -  Case Progress Note    Date of Service: 10/08/20   Name: Kd Slater  : 1995  Medical Record Number: [id number]  Treating Provider:Tenisha Stewart PsyD, LMFT  Type of Session: Individual  Present in Session: Tenisha Fuchs  Number of Minutes:  54    Current Symptoms/Status:  Client reported ongoing concerns related to gender and to general mood.    Progress Toward Treatment Goals:   Client reported that they have begun to \"like\" themselves and lean into affirming gender exploration. Client expressed this as a new feeling for themselves, and how meaningful it is to connect with it.     Scheduled consultation with Dr. Redmond for HRT.  Came out to aunt and had a positive response.    Intervention: Modality and Description:  Therapist focused on joining and creating an alliance with the client.Utilized interpersonal, family systems, and narrative therapy techniques to explore clients, history of presenting problem, and current perceived stressors. Explored experience of coming out to aunt, and reflections around self-deception.    Processed continued denial " around gender and concerns of being grouped into being a fetishist, desire to not be seen as a cross-dresser/lump into category.    Hated self so much I projected it on people closest to me. Don't tell people what I need, people are objects.    What I say and how I feel is not enough, I need to prove myself as trans. Discussed results related to REDCAP measures and desire to take measures as therapeutic intervention.     Rigid ideas around TGNC identities    Something wrong that needs to be solved. If its a choice its not legitimate.    Plan/Need for Future Services:    Assignment:  None given at this time.    DSM-5 Diagnoses:  F64.0    Return for therapy in 1-2 weeks to treat diagnosed problems.      Tenisha Stewart PsyD, LMFT

## 2020-10-22 ENCOUNTER — VIRTUAL VISIT (OUTPATIENT)
Dept: PSYCHOLOGY | Facility: CLINIC | Age: 25
End: 2020-10-22
Payer: COMMERCIAL

## 2020-10-22 DIAGNOSIS — F64.0 GENDER IDENTITY DISORDER IN ADOLESCENTS OR ADULTS: Primary | ICD-10-CM

## 2020-10-22 PROCEDURE — 90837 PSYTX W PT 60 MINUTES: CPT | Mod: GT | Performed by: MARRIAGE & FAMILY THERAPIST

## 2020-10-22 PROCEDURE — 99207 PR NO CHARGE LOS: CPT | Performed by: MARRIAGE & FAMILY THERAPIST

## 2020-10-22 NOTE — PROGRESS NOTES
"Video start time: 2:34 PM  Video end time: 3:30 PM    Telemedicine Visit: The patient's condition can be safely assessed and treated via synchronous audio and visual telemedicine encounter.      Reason for Telemedicine Visit: Services only offered telehealth    Originating Site (Patient Location): Patient's home    Distant Site (Provider Location): [facility]: [facility]    Consent:  The patient/guardian has verbally consented to: the potential risks and benefits of telemedicine (video visit) versus in person care; bill my insurance or make self-payment for services provided; and responsibility for payment of non-covered services.     Mode of Communication:  Video Conference via [employer]    As the provider I attest to compliance with applicable laws and regulations related to telemedicine.      [facility] -  Case Progress Note    Date of Service: 10/22/20   Name: Kd Slater  : 1995  Medical Record Number: [id number]  Treating Provider:Tenisha Stewart PsyD, LMFT  Type of Session: Individual  Present in Session: Tenisha Fuchs  Number of Minutes:  54    Current Symptoms/Status:  Client reported ongoing concerns related to gender and to general mood.    Progress Toward Treatment Goals:   Client reported that they have begun to \"like\" themselves and lean into affirming gender exploration. Client expressed this as a new feeling for themselves, and how meaningful it is to connect with it.     Scheduled consultation with Dr. Redmond for HRT.  Came out to aunt and had a positive response. Asserted pronouns with some friends and family (mom) with positive response.      Intervention: Modality and Description:  Therapist utilized interpersonal, family systems, and narrative therapy techniques to explore clients, history of presenting problem, and current perceived stressors. Explored experience of negative mood and difficulty navigating feels around self-sabotage and " feeling like they continue to not allow themselves to accept gender identity and pursue transition. Continued to process dysphoria, ways to be congruent and authentic with self.     Plan/Need for Future Services:  Continue    Assignment:  None given at this time.    DSM-5 Diagnoses:  F64.0    Return for therapy in 1-2 weeks to treat diagnosed problems.      Tenisha Stewart PsyD, LMFT

## 2020-11-07 ENCOUNTER — HEALTH MAINTENANCE LETTER (OUTPATIENT)
Age: 25
End: 2020-11-07

## 2020-11-12 ENCOUNTER — VIRTUAL VISIT (OUTPATIENT)
Dept: PSYCHOLOGY | Facility: CLINIC | Age: 25
End: 2020-11-12
Payer: COMMERCIAL

## 2020-11-12 DIAGNOSIS — F64.0 GENDER DYSPHORIA IN ADOLESCENT AND ADULT: Primary | ICD-10-CM

## 2020-11-12 PROCEDURE — 99207 PR NO CHARGE LOS: CPT | Performed by: MARRIAGE & FAMILY THERAPIST

## 2020-11-12 PROCEDURE — 90837 PSYTX W PT 60 MINUTES: CPT | Mod: GT | Performed by: MARRIAGE & FAMILY THERAPIST

## 2020-11-12 NOTE — PROGRESS NOTES
"Video start time: 2:32 PM  Video end time: 3:30 PM    Telemedicine Visit: The patient's condition can be safely assessed and treated via synchronous audio and visual telemedicine encounter.      Reason for Telemedicine Visit: Services only offered telehealth    Originating Site (Patient Location): Patient's home    Distant Site (Provider Location): [facility]: [facility]    Consent:  The patient/guardian has verbally consented to: the potential risks and benefits of telemedicine (video visit) versus in person care; bill my insurance or make self-payment for services provided; and responsibility for payment of non-covered services.     Mode of Communication:  Video Conference via [facility]    As the provider I attest to compliance with applicable laws and regulations related to telemedicine.      [place] [facility] -  Case Progress Note    Date of Service: 20   Name: Kd Slater  : 1995  Medical Record Number: [id number]  Treating Provider:Tenisha Stewart PsyD, LMFT  Type of Session: Individual  Present in Session: Tenisha Fuchs  Number of Minutes:  58    Current Symptoms/Status:  Client reported ongoing concerns related to gender and to general mood.    Progress Toward Treatment Goals:   Client reported that they have begun to \"like\" themselves and lean into affirming gender exploration. Client expressed this as a new feeling for themselves, and how meaningful it is to connect with it.     Scheduled consultation with Dr. Redmond for HRT.  Came out to aunt and had a positive response. Asserted pronouns with some friends and family (mom) with positive response.    Client endorsed being conspiratorial around being emmasculated Subconsciously programmed to being feminine.    Intervention: Modality and Description:  Therapist utilized interpersonal, family systems, and narrative therapy techniques to explore clients, history of presenting problem, and current perceived " stressors. Explored experience of recent events (election and situation at apartment), client tendency toward conspiritorial thoughts as a way of self-sabotage. Explored messaging from media and hip hop and how this has shaped client experience. Discussed shift to consuming inspiration content and engaging in authentic ways.    Ashamed, this is bad, this is wrong.. afraid that something bad will happen.  Plan/Need for Future Services:    Assignment:  None given at this time.    DSM-5 Diagnoses:  F64.0    Return for therapy in 1-2 weeks to treat diagnosed problems.      Tenisha Stewart PsyD, LMFT

## 2020-11-19 ENCOUNTER — VIRTUAL VISIT (OUTPATIENT)
Dept: PSYCHOLOGY | Facility: CLINIC | Age: 25
End: 2020-11-19
Payer: COMMERCIAL

## 2020-11-19 DIAGNOSIS — F64.0 GENDER DYSPHORIA IN ADOLESCENT AND ADULT: Primary | ICD-10-CM

## 2020-11-19 DIAGNOSIS — F43.21 ADJUSTMENT DISORDER WITH DEPRESSED MOOD: ICD-10-CM

## 2020-11-19 PROCEDURE — 90837 PSYTX W PT 60 MINUTES: CPT | Mod: GT | Performed by: MARRIAGE & FAMILY THERAPIST

## 2020-11-19 NOTE — PROGRESS NOTES
"Video start time: 2:32 PM  Video end time: 3:30 PM    Telemedicine Visit: The patient's condition can be safely assessed and treated via synchronous audio and visual telemedicine encounter.      Reason for Telemedicine Visit: Services only offered telehealth    Originating Site (Patient Location): Patient's home    Distant Site (Provider Location): [facility]: [facility]    Consent:  The patient/guardian has verbally consented to: the potential risks and benefits of telemedicine (video visit) versus in person care; bill my insurance or make self-payment for services provided; and responsibility for payment of non-covered services.     Mode of Communication:  Video Conference via [employer]    As the provider I attest to compliance with applicable laws and regulations related to telemedicine.      [facility] -  Case Progress Note    Date of Service: 20   Name: Kd Slater  : 1995  Medical Record Number: [id number]  Treating Provider:Tenisha Stewart PsyD, LMFT  Type of Session: Individual  Present in Session: Tenisha Fuchs  Number of Minutes:  58    Current Symptoms/Status:  Client reported ongoing concerns related to gender and to general mood. Client endorsed depressed mood, including lack of motivation, feelings of worthlessness, judgment, negative self talk.     Progress Toward Treatment Goals:   Client reported that they have begun to \"like\" themselves and lean into affirming gender exploration. Client expressed this as a new feeling for themselves, and how meaningful it is to connect with it.     Scheduled consultation with Dr. Redmond for HRT.  Came out to aunt and had a positive response. Asserted pronouns with some friends and family (mom) with positive response.    Client endorsed being conspiratorial around being emmasculated Subconsciously programmed to being feminine.    Intervention: Modality and Description:  Therapist utilized interpersonal, family " "systems, and narrative therapy techniques to explore clients, history of presenting problem, and current perceived stressors..    Explored client recent increase in depressed mood, negative self-talk and worthlessness. Not wanting to reflect anything about myself that isn't perfect.\"It has to be perfect or it isn't worth it.\" Explored roots of belief around perfection, including expectations.    Focused on uncovering self-worth as tied to performance. Feeling worthless when others don't pay attention to me, (they don't give a shit about me, what I do). history of pattern.     \"I have to go to THE BEST school, and do THE BEST at everything in order to survive and get out of these situations.\"     Plan/Need for Future Services:    Assignment:  None given at this time.    DSM-5 Diagnoses:  Encounter Diagnoses   Name Primary?     Gender dysphoria in adolescent and adult Yes     Adjustment disorder with depressed mood          Return for therapy in 1-2 weeks to treat diagnosed problems.      Tenisha Stewart PsyD, LMFT  "

## 2020-12-03 ENCOUNTER — VIRTUAL VISIT (OUTPATIENT)
Dept: PSYCHOLOGY | Facility: CLINIC | Age: 25
End: 2020-12-03
Payer: COMMERCIAL

## 2020-12-03 DIAGNOSIS — F64.0 GENDER DYSPHORIA IN ADOLESCENT AND ADULT: Primary | ICD-10-CM

## 2020-12-03 DIAGNOSIS — F43.21 ADJUSTMENT DISORDER WITH DEPRESSED MOOD: ICD-10-CM

## 2020-12-03 PROCEDURE — 90837 PSYTX W PT 60 MINUTES: CPT | Mod: GT | Performed by: MARRIAGE & FAMILY THERAPIST

## 2020-12-03 NOTE — PROGRESS NOTES
"Video start time: 1:35 PM  Video end time: 2:30 PM    Telemedicine Visit: The patient's condition can be safely assessed and treated via synchronous audio and visual telemedicine encounter.      Reason for Telemedicine Visit: Services only offered telehealth    Originating Site (Patient Location): Patient's home    Distant Site (Provider Location): [facility]: [facility]    Consent:  The patient/guardian has verbally consented to: the potential risks and benefits of telemedicine (video visit) versus in person care; bill my insurance or make self-payment for services provided; and responsibility for payment of non-covered services.     Mode of Communication:  Video Conference via [employer]    As the provider I attest to compliance with applicable laws and regulations related to telemedicine.      [facility] -  Case Progress Note    Date of Service: 20   Name: Kd Slater  : 1995  Medical Record Number: [id number]  Treating Provider:Tenisha Stewart PsyD, LMFT  Type of Session: Individual  Present in Session: Tenisha Fuchs  Number of Minutes:  55    Current Symptoms/Status:  Client reported ongoing concerns related to gender and to general mood. Client endorsed depressed mood, including lack of motivation, feelings of worthlessness, judgment, negative self talk.     Progress Toward Treatment Goals:   Client reported that they have begun to \"like\" themselves and lean into affirming gender exploration. Client expressed questioning around future planning.   Scheduled consultation with Dr. Redmond for HRT.  Came out to aunt and had a positive response. Asserted pronouns with some friends and family (mom) with positive response.      Intervention: Modality and Description:  Therapist utilized interpersonal, family systems, and narrative therapy techniques to explore clients, history of presenting problem, and current perceived stressors. Explored experience of decrease in " negative mood, contentment with taking a break to enjoy life versus managing anxiety about what to do with life.    Processed client mindset about being okay with uncertainty around gender identity and managing dysphoria.    Plan/Need for Future Services:none.  Assignment:  None given at this time.    DSM-5 Diagnoses:  Encounter Diagnoses   Name Primary?     Gender dysphoria in adolescent and adult Yes     Adjustment disorder with depressed mood        Return for therapy in 1-2 weeks to treat diagnosed problems.      Tenisha Stewart PsyD, LMFT

## 2020-12-10 ENCOUNTER — VIRTUAL VISIT (OUTPATIENT)
Dept: PSYCHOLOGY | Facility: CLINIC | Age: 25
End: 2020-12-10
Payer: COMMERCIAL

## 2020-12-10 DIAGNOSIS — F64.0 GENDER DYSPHORIA IN ADOLESCENT AND ADULT: Primary | ICD-10-CM

## 2020-12-10 DIAGNOSIS — F43.21 ADJUSTMENT DISORDER WITH DEPRESSED MOOD: ICD-10-CM

## 2020-12-10 PROCEDURE — 90837 PSYTX W PT 60 MINUTES: CPT | Mod: GT | Performed by: MARRIAGE & FAMILY THERAPIST

## 2020-12-10 NOTE — PROGRESS NOTES
"Video start time: 1:05 PM  Video end time: : 2:00 PM    Telemedicine Visit: The patient's condition can be safely assessed and treated via synchronous audio and visual telemedicine encounter.      Reason for Telemedicine Visit: Services only offered telehealth    Originating Site (Patient Location): Patient's home    Distant Site (Provider Location): [facility]: [facility]    Consent:  The patient/guardian has verbally consented to: the potential risks and benefits of telemedicine (video visit) versus in person care; bill my insurance or make self-payment for services provided; and responsibility for payment of non-covered services.     Mode of Communication:  Video Conference via [employer]    As the provider I attest to compliance with applicable laws and regulations related to telemedicine.      [facility] -  Case Progress Note    Date of Service: 12/10/20   Name: Kd Slater  : 1995  Medical Record Number: [id number]  Treating Provider:Tenisha Stewart PsyD, LMFT  Type of Session: Individual  Present in Session: Tenisha Fuchs  Number of Minutes:  55    Current Symptoms/Status:  Client reported ongoing concerns related to gender and to general mood. Client endorsed depressed mood, including lack of motivation, feelings of worthlessness, judgment, negative self talk.     Progress Toward Treatment Goals:   Client reported that they have begun to \"like\" themselves and lean into affirming gender exploration. Client expressed questioning around future planning.   Scheduled consultation with Dr. Redmond for HRT.  Came out to aunt and had a positive response. Asserted pronouns with some friends and family (mom) with positive response.      Intervention: Modality and Description:  Therapist utilized interpersonal, family systems, and narrative therapy techniques to explore clients, history of presenting problem, and current perceived stressors.    Processed client mindset " about being okay with uncertainty around gender identity and managing dysphoria.explored role of anxiety in sabotaging plans to transition, over thinking and use of NLP, utilizing anxiety as motivation.     Client endorsed difficult time since October: stuck, lost, confused, upset.  Explored iceberg analogy of dealing with problems, including gender dysphoria and depression, hopelessness, lack of motivation.    What is true for me, what is real for me, more difficult to discern what I am thinking/feeling around gender identity.      Plan/Need for Future Services: reschedule with Dr. Redmond    Assignment:  None given at this time.    DSM-5 Diagnoses:  Encounter Diagnoses   Name Primary?     Gender dysphoria in adolescent and adult Yes     Adjustment disorder with depressed mood        Return for therapy in 1-2 weeks to treat diagnosed problems.      Tenisha Stewart PsyD, LMFT

## 2020-12-17 ENCOUNTER — VIRTUAL VISIT (OUTPATIENT)
Dept: PSYCHOLOGY | Facility: CLINIC | Age: 25
End: 2020-12-17
Payer: COMMERCIAL

## 2020-12-17 DIAGNOSIS — F64.0 GENDER DYSPHORIA IN ADOLESCENT AND ADULT: Primary | ICD-10-CM

## 2020-12-17 PROCEDURE — 90834 PSYTX W PT 45 MINUTES: CPT | Mod: GT | Performed by: MARRIAGE & FAMILY THERAPIST

## 2020-12-17 NOTE — PROGRESS NOTES
"Video start time: 1:05 PM  Video end time: : 1:50 PM    Telemedicine Visit: The patient's condition can be safely assessed and treated via synchronous audio and visual telemedicine encounter.      Reason for Telemedicine Visit: Services only offered telehealth    Originating Site (Patient Location): Patient's home    Distant Site (Provider Location): [facility]: [facility]    Consent:  The patient/guardian has verbally consented to: the potential risks and benefits of telemedicine (video visit) versus in person care; bill my insurance or make self-payment for services provided; and responsibility for payment of non-covered services.     Mode of Communication:  Video Conference via [employer]    As the provider I attest to compliance with applicable laws and regulations related to telemedicine.      [facility] -  Case Progress Note    Date of Service: 20   Name: Kd Slater  : 1995  Medical Record Number: [id number]  Treating Provider:Tenisha Stewart PsyD, LMFT  Type of Session: Individual  Present in Session: Tenisha Fuchs  Number of Minutes:  45    Current Symptoms/Status:  Client reported ongoing concerns related to gender and to general mood. Client endorsed depressed mood, including lack of motivation, feelings of worthlessness, judgment, negative self talk.     Progress Toward Treatment Goals:   Client reported that they have begun to \"like\" themselves and lean into affirming gender exploration. Client expressed questioning around future planning.   Scheduled consultation with Dr. Redmond for HRT.  Came out to aunt and had a positive response. Asserted pronouns with some friends and family (mom) with positive response.      Intervention: Modality and Description:  Therapist utilized interpersonal, family systems, and narrative therapy techniques to explore clients, history of presenting problem, and current perceived stressors.    Processed client mindset " around gender identity and mood, explored changes related to progress. Continued to explored challenges around relationship with roommate and navigating how to engage around gender.      Plan/Need for Future Services: None.    Assignment:  None given at this time.     DSM-5 Diagnoses:  Encounter Diagnosis   Name Primary?     Gender dysphoria in adolescent and adult Yes       Return for therapy in 1-2 weeks to treat diagnosed problems.      Tenisha Stewart PsyD, LMFT

## 2020-12-21 ENCOUNTER — VIRTUAL VISIT (OUTPATIENT)
Dept: PSYCHOLOGY | Facility: CLINIC | Age: 25
End: 2020-12-21
Payer: COMMERCIAL

## 2020-12-21 DIAGNOSIS — F43.21 ADJUSTMENT DISORDER WITH DEPRESSED MOOD: ICD-10-CM

## 2020-12-21 DIAGNOSIS — F64.0 GENDER DYSPHORIA IN ADOLESCENT AND ADULT: Primary | ICD-10-CM

## 2020-12-21 PROCEDURE — 99207 PR NO CHARGE LOS: CPT | Performed by: MARRIAGE & FAMILY THERAPIST

## 2020-12-21 PROCEDURE — 90837 PSYTX W PT 60 MINUTES: CPT | Mod: GT | Performed by: MARRIAGE & FAMILY THERAPIST

## 2020-12-21 NOTE — PROGRESS NOTES
"Video start time: 2:34 PM  Video end time: : 3:30 PM    Telemedicine Visit: The patient's condition can be safely assessed and treated via synchronous audio and visual telemedicine encounter.      Reason for Telemedicine Visit: Services only offered telehealth    Originating Site (Patient Location): Patient's home    Distant Site (Provider Location): [facility]: [facility]    Consent:  The patient/guardian has verbally consented to: the potential risks and benefits of telemedicine (video visit) versus in person care; bill my insurance or make self-payment for services provided; and responsibility for payment of non-covered services.     Mode of Communication:  Video Conference via [employer]    As the provider I attest to compliance with applicable laws and regulations related to telemedicine.      [facility] -  Case Progress Note    Date of Service: 20   Name: Kd Slater  : 1995  Medical Record Number: [id number]  Treating Provider:Tenisha Stewart PsyD, LMFT  Type of Session: Individual  Present in Session: Tenisha Fuchs  Number of Minutes:  56    Current Symptoms/Status:  Client reported ongoing concerns related to gender and to general mood. Client endorsed depressed mood, including lack of motivation, feelings of worthlessness, judgment, negative self talk.     Progress Toward Treatment Goals:   Client reported that they have begun to \"like\" themselves and lean into affirming gender exploration. Client expressed questioning around future planning.   Scheduled consultation with Dr. Redmond for HRT.  Came out to aunt and had a positive response. Asserted pronouns with some friends and family (mom) with positive response.       Intervention: Modality and Description:  Therapist utilized interpersonal, family systems, and narrative therapy techniques to explore clients, history of presenting problem, and current perceived stressors.    Processed client " disappointment with misunderstanding with Dr. Redmond visit. Explored continued confusion, sadness, distress related to roommate/ex partner. Noted themes around judgment, expectation, and emotional cutoff.      Plan/Need for Future Services: reschedule with Dr. Redmond    Assignment:  None given at this time.     DSM-5 Diagnoses:  Encounter Diagnoses   Name Primary?     Gender dysphoria in adolescent and adult Yes     Adjustment disorder with depressed mood        Return for therapy in 1-2 weeks to treat diagnosed problems.      Tenisha Stewart PsyD, LMFT

## 2021-01-05 VITALS — WEIGHT: 138.6 LBS

## 2021-01-07 ENCOUNTER — VIRTUAL VISIT (OUTPATIENT)
Dept: FAMILY MEDICINE | Facility: CLINIC | Age: 26
End: 2021-01-07
Payer: COMMERCIAL

## 2021-01-07 DIAGNOSIS — F64.0 GENDER DYSPHORIA IN ADOLESCENT AND ADULT: Primary | ICD-10-CM

## 2021-01-07 PROCEDURE — 99203 OFFICE O/P NEW LOW 30 MIN: CPT | Mod: GT | Performed by: FAMILY MEDICINE

## 2021-01-07 NOTE — PROGRESS NOTES
Jef is a 25 year old who is being evaluated via a billable video visit.      How would you like to obtain your AVS? [employer]  If the video visit is dropped, the invitation should be resent by: Other e-mail: [email or web address]  Will anyone else be joining your video visit? No    This is a transgender medical consultation at the request of Dr. Tenisha Stewart.      IDENTIFICATION:  A 25-year-old trans woman.      CHIEF CONCERN:  Therapy.      HISTORY OF PRESENT ILLNESS:  The patient has a longstanding history of gender dysphoria.  The diagnostic assessment of Dr. Stewart was reviewed.  The patient's goals for hormone therapy are to achieve secondary sexual characteristics close to those of a cisgender woman and to relieve gender dysphoria.  The patient has no current medical problems.  He takes no medications.      ALLERGIES:  No known drug allergies.      PAST MEDICAL HISTORY:  Hospitalized for pneumonia at age 4, no surgeries.      FAMILY HISTORY:  Mother with bipolar disorder and glaucoma.  Father with depression, alcohol, substance abuse and migraines.  Brother with alcohol, substance abuse.  Maternal grandmother with mental health concerns, breast cancer, alcohol, osteoporosis.  Maternal grandfather with depression, alcohol and lung cancer.  Paternal grandmother with mental health concerns.  Paternal grandfather with Parkinson's and depression.      SOCIAL HISTORY:  The patient eats a standard diet, including dairy.  Walks 2-3 times a week.  Has never smoked.  Marijuana about 2 times a week.  Alcohol once every 2 weeks, 2 drinks per sitting.  Has no children.  Works as a .      SEXUAL HISTORY:  The patient is sexually active with a female partner.  No STIs.  Five partners in lifetime.  Has never been tested against HIV.  Is vaccinated against hepatitis B.      REVIEW OF SYSTEMS:  Notable for anxiety.  PHQ-9 is 7.  MICHELLE-7 is 14, done about a month previous to this visit.  Notes that anxiety has improved considerably since  this date.  Remainder of 12-point review of systems is negative except where noted above.     Exam today:  Alert, NAD  Resp: speaks easily in complete sentences  Mood appears euthymic    A/P  1. Gender dysphoria  The feminizing effects of hormone therapy were discussed at length, along the variability of outcomes and general timeframe for expected feminizing changes. Permanent vs semi-reversible changes were reviewed.   Patient was counseled regarding the potential risks and side effects of feminizing therapy including:  - reduced fertility, reproductive options, need for ongoing contraception (if indicated),  -changes to sexual function including reduced erections, libido and ejaculation  -potential for weight gain, changes to trigylcerides, and other indirect metabolic effects  -increased risk of venous thromboembolic events, gallstones, liver function tests  --mood changes, long term cardiovascular risks, potential cancer risks including breast cancer    I discussed the possible risk of temporary or irreversible impairment of the fertility with the patient today. She demonstrated a complete understanding of the fertility preservation options and declined fertility preservation.     Reviewed next steps for hormone start:  Will need physical exam from PCP or myself--scheduled Feb. 1  Baseline labs: CMP, lipid panel, testosterone    Follow-up when ready for hormone start.      Video Start Time: 340      Assessment & Plan   Problem List Items Addressed This Visit     None      Visit Diagnoses     Gender dysphoria in adolescent and adult    -  Primary    Relevant Orders    Testosterone Free and Total ([place] Send Out)    Lipid panel reflex to direct LDL Fasting    Comprehensive Metabolic Panel ([place] Send Out)                    Patricio Redmond MD  [facility]        Video-Visit Details    Type of service:  Video Visit    Video End Time:4:24 PM    Originating Location (pt.  Location): Home    Distant Location (provider location):  [facility]     Platform used for Video Visit: [email or web address]

## 2021-01-11 ENCOUNTER — VIRTUAL VISIT (OUTPATIENT)
Dept: PSYCHOLOGY | Facility: CLINIC | Age: 26
End: 2021-01-11
Payer: COMMERCIAL

## 2021-01-11 DIAGNOSIS — F64.0 GENDER DYSPHORIA IN ADOLESCENT AND ADULT: Primary | ICD-10-CM

## 2021-01-11 PROCEDURE — 99207 PR NO BILLABLE SERVICE THIS VISIT: CPT | Performed by: MARRIAGE & FAMILY THERAPIST

## 2021-01-11 PROCEDURE — 90837 PSYTX W PT 60 MINUTES: CPT | Mod: GT | Performed by: MARRIAGE & FAMILY THERAPIST

## 2021-01-11 NOTE — PROGRESS NOTES
"Video start time: 3:30 PM  Video end time: : 4:25PM    Telemedicine Visit: The patient's condition can be safely assessed and treated via synchronous audio and visual telemedicine encounter.      Reason for Telemedicine Visit: Services only offered telehealth    Originating Site (Patient Location): Patient's home    Distant Site (Provider Location): [facility]: [facility]    Consent:  The patient/guardian has verbally consented to: the potential risks and benefits of telemedicine (video visit) versus in person care; bill my insurance or make self-payment for services provided; and responsibility for payment of non-covered services.     Mode of Communication:  Video Conference via [employer]    As the provider I attest to compliance with applicable laws and regulations related to telemedicine.      [facility] -  Case Progress Note    Date of Service: 21   Name: Kd Slater  : 1995  Medical Record Number: [id number]  Treating Provider:Tenisha Stewart PsyD, LMFT  Type of Session: Individual  Present in Session: Tenisha Fuchs  Number of Minutes:  55    Current Symptoms/Status:  Client reported ongoing concerns related to gender and to general mood. Client endorsed depressed mood, including lack of motivation, feelings of worthlessness, judgment, negative self talk.     Progress Toward Treatment Goals:   Client reported that they have begun to \"like\" themselves and lean into affirming gender exploration. Client expressed questioning around future planning.   Scheduled consultation with Dr. Redmond for HRT.  Came out to aunt and had a positive response. Asserted pronouns with some friends and family (mom) with positive response.       Intervention: Modality and Description:  Therapist utilized interpersonal, family systems, and narrative therapy techniques to explore clients, history of presenting problem, and current perceived stressors.    Processed client experience of " meeting with Dr. Redmond and decisions around HRT. Explored client continued questioning around authenticity, self judgment and questioning gender.    Plan/Need for Future Services:  Continue therapy.    Assignment:  None given at this time.     DSM-5 Diagnoses:  Encounter Diagnosis   Name Primary?     Gender dysphoria in adolescent and adult Yes         Return for therapy in 1-2 weeks to treat diagnosed problems.      Tenisha Stewart PsyD, LMFT

## 2021-01-27 ENCOUNTER — MEDICAL CORRESPONDENCE (OUTPATIENT)
Dept: HEALTH INFORMATION MANAGEMENT | Facility: CLINIC | Age: 26
End: 2021-01-27

## 2021-01-27 ASSESSMENT — ANXIETY QUESTIONNAIRES
6. BECOMING EASILY ANNOYED OR IRRITABLE: MORE THAN HALF THE DAYS
2. NOT BEING ABLE TO STOP OR CONTROL WORRYING: MORE THAN HALF THE DAYS
5. BEING SO RESTLESS THAT IT IS HARD TO SIT STILL: NEARLY EVERY DAY
GAD7 TOTAL SCORE: 14
7. FEELING AFRAID AS IF SOMETHING AWFUL MIGHT HAPPEN: MORE THAN HALF THE DAYS
3. WORRYING TOO MUCH ABOUT DIFFERENT THINGS: NOT AT ALL
1. FEELING NERVOUS, ANXIOUS, OR ON EDGE: MORE THAN HALF THE DAYS

## 2021-01-27 ASSESSMENT — PATIENT HEALTH QUESTIONNAIRE - PHQ9
SUM OF ALL RESPONSES TO PHQ QUESTIONS 1-9: 7
5. POOR APPETITE OR OVEREATING: NEARLY EVERY DAY

## 2021-01-28 ASSESSMENT — ANXIETY QUESTIONNAIRES: GAD7 TOTAL SCORE: 14

## 2021-02-04 ENCOUNTER — VIRTUAL VISIT (OUTPATIENT)
Dept: PSYCHOLOGY | Facility: CLINIC | Age: 26
End: 2021-02-04
Payer: COMMERCIAL

## 2021-02-04 DIAGNOSIS — F43.21 ADJUSTMENT DISORDER WITH DEPRESSED MOOD: ICD-10-CM

## 2021-02-04 DIAGNOSIS — F64.0 GENDER DYSPHORIA IN ADOLESCENT AND ADULT: Primary | ICD-10-CM

## 2021-02-04 PROCEDURE — 99207 PR NO BILLABLE SERVICE THIS VISIT: CPT | Performed by: MARRIAGE & FAMILY THERAPIST

## 2021-02-04 PROCEDURE — 90837 PSYTX W PT 60 MINUTES: CPT | Mod: GT | Performed by: MARRIAGE & FAMILY THERAPIST

## 2021-02-04 NOTE — PROGRESS NOTES
"Video start time: 2:34 PM  Video end time: : 3:30 PM    Telemedicine Visit: The patient's condition can be safely assessed and treated via synchronous audio and visual telemedicine encounter.      Reason for Telemedicine Visit: Services only offered telehealth    Originating Site (Patient Location): Patient's home    Distant Site (Provider Location): [facility]: [facility]    Consent:  The patient/guardian has verbally consented to: the potential risks and benefits of telemedicine (video visit) versus in person care; bill my insurance or make self-payment for services provided; and responsibility for payment of non-covered services.     Mode of Communication:  Video Conference via Doxy    As the provider I attest to compliance with applicable laws and regulations related to telemedicine.      [facility] -  Case Progress Note    Date of Service: 21   Name: Kd Slater  : 1995  Medical Record Number: [id number]  Treating Provider:Tenisha Stewart PsyD, LMFT  Type of Session: Individual  Present in Session: Tenisha Fuchs  Number of Minutes:  56    Current Symptoms/Status:  Client reported ongoing concerns related to gender and to general mood. Client endorsed depressed mood, including lack of motivation, feelings of worthlessness, judgment, negative self talk.     Progress Toward Treatment Goals:   Client reported that they have begun to \"like\" themselves and lean into affirming gender exploration. Client expressed questioning around future planning.   Had consultation with Dr. Redmond for HRT.  Came out to aunt and had a positive response. Asserted pronouns with some friends and family (mom) with positive response.       Intervention: Modality and Description:  Therapist utilized interpersonal, family systems, and narrative therapy techniques to explore clients, history of presenting problem, and current perceived stressors.    Explored updates around romantic " "feelings, navigating interpersonal boundaries, and impact of gender dysphoria/questioning. Explored how attractiveness, self confidence and insecurity around appearance impact clients wellbeing.    Notes:     Conversation with ex/roommate around need for more space/less intimacy    Hate self so much I don't want them to look at me. \"ugly stupid...\" I don't want them to know about the things I don't like.    Utilizing nonviolent communication, social constructionist framework.    Plan/Need for Future Services:    Assignment:  None given at this time.     DSM-5 Diagnoses:  Encounter Diagnoses   Name Primary?     Gender dysphoria in adolescent and adult Yes     Adjustment disorder with depressed mood          Return for therapy in 1-2 weeks to treat diagnosed problems.      Tenisha Stewart PsyD, LMFT  "

## 2021-02-10 DIAGNOSIS — F64.0 GENDER DYSPHORIA IN ADOLESCENT AND ADULT: ICD-10-CM

## 2021-02-10 LAB
ALBUMIN SERPL-MCNC: 4.4 G/DL (ref 3.4–5)
ALP SERPL-CCNC: 91 U/L (ref 40–150)
ALT SERPL W P-5'-P-CCNC: 24 U/L (ref 0–70)
ANION GAP SERPL CALCULATED.3IONS-SCNC: 3 MMOL/L (ref 3–14)
AST SERPL W P-5'-P-CCNC: 16 U/L (ref 0–45)
BILIRUB SERPL-MCNC: 0.5 MG/DL (ref 0.2–1.3)
BUN SERPL-MCNC: 8 MG/DL (ref 7–30)
CALCIUM SERPL-MCNC: 9.2 MG/DL (ref 8.5–10.1)
CHLORIDE SERPL-SCNC: 108 MMOL/L (ref 94–109)
CHOLEST SERPL-MCNC: 153 MG/DL
CO2 SERPL-SCNC: 30 MMOL/L (ref 20–32)
CREAT SERPL-MCNC: 0.66 MG/DL (ref 0.66–1.25)
GFR SERPL CREATININE-BSD FRML MDRD: >90 ML/MIN/{1.73_M2}
GLUCOSE SERPL-MCNC: 92 MG/DL (ref 70–99)
HDLC SERPL-MCNC: 74 MG/DL
LDLC SERPL CALC-MCNC: 70 MG/DL
NONHDLC SERPL-MCNC: 79 MG/DL
POTASSIUM SERPL-SCNC: 4.3 MMOL/L (ref 3.4–5.3)
PROT SERPL-MCNC: 7.8 G/DL (ref 6.8–8.8)
SODIUM SERPL-SCNC: 140 MMOL/L (ref 133–144)
TRIGL SERPL-MCNC: 45 MG/DL

## 2021-02-11 ENCOUNTER — VIRTUAL VISIT (OUTPATIENT)
Dept: PSYCHOLOGY | Facility: CLINIC | Age: 26
End: 2021-02-11
Payer: COMMERCIAL

## 2021-02-11 DIAGNOSIS — F64.0 GENDER DYSPHORIA IN ADOLESCENT AND ADULT: Primary | ICD-10-CM

## 2021-02-11 DIAGNOSIS — F43.21 ADJUSTMENT DISORDER WITH DEPRESSED MOOD: ICD-10-CM

## 2021-02-11 PROCEDURE — 99207 PR NO BILLABLE SERVICE THIS VISIT: CPT | Performed by: MARRIAGE & FAMILY THERAPIST

## 2021-02-11 PROCEDURE — 90837 PSYTX W PT 60 MINUTES: CPT | Mod: GT | Performed by: MARRIAGE & FAMILY THERAPIST

## 2021-02-11 NOTE — PROGRESS NOTES
"Video start time: 3:07 PM  Video end time: :  4:00 PM    Telemedicine Visit: The patient's condition can be safely assessed and treated via synchronous audio and visual telemedicine encounter.      Reason for Telemedicine Visit: Services only offered telehealth    Originating Site (Patient Location): Patient's home    Distant Site (Provider Location): [facility]: [facility]    Consent:  The patient/guardian has verbally consented to: the potential risks and benefits of telemedicine (video visit) versus in person care; bill my insurance or make self-payment for services provided; and responsibility for payment of non-covered services.     Mode of Communication:  Video Conference via Doxy    As the provider I attest to compliance with applicable laws and regulations related to telemedicine.      [facility] Sexual [facility] -  Case Progress Note    Date of Service: 21   Name: Kd Slater  : 1995  Medical Record Number: [id number]  Treating Provider:Tenisha Stewart PsyD, LMFT  Type of Session: Individual  Present in Session: Tenisha Fuchs  Number of Minutes:  53    Current Symptoms/Status:  Client reported ongoing concerns related to gender and to general mood. Client endorsed depressed mood, including lack of motivation, feelings of worthlessness, judgment, negative self talk.     Progress Toward Treatment Goals:   Client reported that they have begun to \"like\" themselves and lean into affirming gender exploration. Client expressed questioning around future planning.   Had consultation with Dr. Redmond for HRT.  Came out to aunt and had a positive response. Asserted pronouns with some friends and family (mom) with positive response.       Intervention: Modality and Description:  Therapist utilized interpersonal, family systems, and narrative therapy techniques to explore clients, history of presenting problem, and current perceived stressors. Explored continued questioning around " gender, desire to transition and implications of family dynamics around acceptance, self love and loathing.    Plan/Need for Future Services:  none    Assignment:  None given at this time.     DSM-5 Diagnoses:  Encounter Diagnoses   Name Primary?     Gender dysphoria in adolescent and adult Yes     Adjustment disorder with depressed mood        Return for therapy in 1-2 weeks to treat diagnosed problems.      Tenisha Stewart PsyD, LMFT

## 2021-02-14 LAB
SHBG SERPL-SCNC: 46 NMOL/L (ref 11–80)
TESTOST FREE SERPL-MCNC: 15.44 NG/DL (ref 4.7–24.4)
TESTOST SERPL-MCNC: 825 NG/DL (ref 240–950)

## 2021-02-15 ENCOUNTER — OFFICE VISIT (OUTPATIENT)
Dept: FAMILY MEDICINE | Facility: CLINIC | Age: 26
End: 2021-02-15
Payer: COMMERCIAL

## 2021-02-15 VITALS
BODY MASS INDEX: 20.55 KG/M2 | HEIGHT: 68 IN | SYSTOLIC BLOOD PRESSURE: 120 MMHG | WEIGHT: 135.6 LBS | DIASTOLIC BLOOD PRESSURE: 64 MMHG | HEART RATE: 83 BPM

## 2021-02-15 DIAGNOSIS — F64.0 GENDER DYSPHORIA IN ADOLESCENT AND ADULT: Primary | ICD-10-CM

## 2021-02-15 PROCEDURE — 99214 OFFICE O/P EST MOD 30 MIN: CPT | Performed by: FAMILY MEDICINE

## 2021-02-15 RX ORDER — ESTRADIOL 2 MG/1
2 TABLET ORAL DAILY
Qty: 30 TABLET | Refills: 1 | Status: SHIPPED | OUTPATIENT
Start: 2021-02-15 | End: 2021-04-08

## 2021-02-15 ASSESSMENT — MIFFLIN-ST. JEOR: SCORE: 1584.58

## 2021-02-15 ASSESSMENT — ENCOUNTER SYMPTOMS
CONSTITUTIONAL NEGATIVE: 1
SHORTNESS OF BREATH: 0

## 2021-02-15 NOTE — PROGRESS NOTES
.          HPI     Jef is a 25 year old individual that uses pronouns They/Them/Their/Theirs that presents today for follow up of:  feminizing hormone therapy.   Alone or accompanied by: accompanied today by  Gender identity: transfeminine  Started Hormone  therapy  n/a  Continues on Other n/a*.   Any special concerns today?   No new concerns, due for physical exam and hormone start    On hormones?  No  Gender related body changes since last visit: n/a    Breakthrough bleeding? Does Not Apply    New health concerns since last visit:  ---none  Quit smoking, was at 2 cig/day,   No past surgical history on file.    There is no problem list on file for this patient.      No current outpatient medications on file.       History   Smoking Status     Not on file   Smokeless Tobacco     Not on file        No Known Allergies    There are no preventive care reminders to display for this patient.      Problem, Medication and Allergy Lists were reviewed and are current..         Review of Systems:   Review of Systems   Constitutional: Negative.    Respiratory: Negative for shortness of breath.    Cardiovascular: Negative for chest pain.              Labs:   Results from last visit:  Orders Only on 02/10/2021   Component Date Value Ref Range Status     Sodium 02/10/2021 140  133 - 144 mmol/L Final     Potassium 02/10/2021 4.3  3.4 - 5.3 mmol/L Final     Chloride 02/10/2021 108  94 - 109 mmol/L Final     Carbon Dioxide 02/10/2021 30  20 - 32 mmol/L Final     Anion Gap 02/10/2021 3  3 - 14 mmol/L Final     Glucose 02/10/2021 92  70 - 99 mg/dL Final     Urea Nitrogen 02/10/2021 8  7 - 30 mg/dL Final     Creatinine 02/10/2021 0.66  0.66 - 1.25 mg/dL Final     GFR Estimate 02/10/2021 >90  >60 mL/min/[1.73_m2] Final    Comment: Non  GFR Calc  Starting 12/18/2018, serum creatinine based estimated GFR (eGFR) will be   calculated using the Chronic Kidney Disease Epidemiology Collaboration   (CKD-EPI) equation.       GFR  "Estimate If Black 02/10/2021 >90  >60 mL/min/[1.73_m2] Final    Comment:  GFR Calc  Starting 12/18/2018, serum creatinine based estimated GFR (eGFR) will be   calculated using the Chronic Kidney Disease Epidemiology Collaboration   (CKD-EPI) equation.       Calcium 02/10/2021 9.2  8.5 - 10.1 mg/dL Final     Bilirubin Total 02/10/2021 0.5  0.2 - 1.3 mg/dL Final     Albumin 02/10/2021 4.4  3.4 - 5.0 g/dL Final     Protein Total 02/10/2021 7.8  6.8 - 8.8 g/dL Final     Alkaline Phosphatase 02/10/2021 91  40 - 150 U/L Final     ALT 02/10/2021 24  0 - 70 U/L Final     AST 02/10/2021 16  0 - 45 U/L Final     Cholesterol 02/10/2021 153  <200 mg/dL Final     Triglycerides 02/10/2021 45  <150 mg/dL Final     HDL Cholesterol 02/10/2021 74  >39 mg/dL Final     LDL Cholesterol Calculated 02/10/2021 70  <100 mg/dL Final    Desirable:       <100 mg/dl     Non HDL Cholesterol 02/10/2021 79  <130 mg/dL Final     Testosterone Total 02/10/2021 825  240 - 950 ng/dL Final    Comment: This test was developed and its performance characteristics determined by the   [facility].   It has not been cleared or approved by the FDA. The laboratory is regulated   under CLIA as qualified to perform high-complexity testing. This test is used   for clinical purposes. It should not be regarded as investigational or for   research.       Sex Hormone Binding Globulin 02/10/2021 46  11 - 80 nmol/L Final     Free Testosterone Calculated 02/10/2021 15.44  4.7 - 24.4 ng/dL Final         EXAM:  Blood pressure 120/64, pulse 83, height 1.727 m (5' 8\"), weight 61.5 kg (135 lb 9.6 oz).  Body mass index is 20.62 kg/m .      Constitutional: healthy, alert and no distress   Cardiovascular: negative, PMI normal. No lifts, heaves, or thrills. RRR. No murmurs, clicks gallops or rub  Respiratory: negative, Percussion normal. Good diaphragmatic excursion. Lungs clear  Psychiatric: " mentation appears normal and affect normal/bright  Neck: Neck supple. No adenopathy. Thyroid symmetric, normal size,, Carotids without bruits.  Abdomen: Abdomen soft, non-tender. BS normal. No masses, organomegaly  NEURO: Gait normal. Reflexes normal and symmetric. Sensation grossly WNL., negative findings: cranial nerves 2-12 intact, muscle strength normal, reflexes normal and symmetric  SKIN: no suspicious lesions or rashes  LYMPH: Normal cervical lymph nodes  JOINT/EXTREMITIES: extremities normal- no gross deformities noted, gait normal and normal muscle tone   Breast, hip measurements per synposis    Assessment and Plan   A/P  1. Gender dysphoria  Reviewed labs with patient  No contraindications to starting hormone therapy  Start Estrace 2 mg daily    No contrinidications to hormone therapy  Reviewed labs  Start estrace 2mg   Follow-up 1-2 months         Results by mychart  Questions were elicited and answered.     Patricio Redmond MD

## 2021-02-17 ENCOUNTER — VIRTUAL VISIT (OUTPATIENT)
Dept: PSYCHOLOGY | Facility: CLINIC | Age: 26
End: 2021-02-17
Payer: COMMERCIAL

## 2021-02-17 DIAGNOSIS — F43.21 ADJUSTMENT DISORDER WITH DEPRESSED MOOD: Primary | ICD-10-CM

## 2021-02-17 DIAGNOSIS — F64.0 GENDER DYSPHORIA IN ADOLESCENT AND ADULT: ICD-10-CM

## 2021-02-17 PROCEDURE — 90837 PSYTX W PT 60 MINUTES: CPT | Mod: GT | Performed by: MARRIAGE & FAMILY THERAPIST

## 2021-02-17 PROCEDURE — 99207 PR NO BILLABLE SERVICE THIS VISIT: CPT | Performed by: MARRIAGE & FAMILY THERAPIST

## 2021-03-04 ENCOUNTER — VIRTUAL VISIT (OUTPATIENT)
Dept: PSYCHOLOGY | Facility: CLINIC | Age: 26
End: 2021-03-04
Payer: COMMERCIAL

## 2021-03-04 DIAGNOSIS — F64.0 GENDER DYSPHORIA IN ADOLESCENT AND ADULT: Primary | ICD-10-CM

## 2021-03-04 PROCEDURE — 99207 PR NO BILLABLE SERVICE THIS VISIT: CPT | Performed by: MARRIAGE & FAMILY THERAPIST

## 2021-03-04 PROCEDURE — 90837 PSYTX W PT 60 MINUTES: CPT | Mod: GT | Performed by: MARRIAGE & FAMILY THERAPIST

## 2021-03-04 NOTE — PROGRESS NOTES
"Video start time: 3:05 PM  Video end time: :  4:00 PM    Telemedicine Visit: The patient's condition can be safely assessed and treated via synchronous audio and visual telemedicine encounter.      Reason for Telemedicine Visit: Services only offered telehealth    Originating Site (Patient Location): Patient's home    Distant Site (Provider Location): [facility]: [facility]    Consent:  The patient/guardian has verbally consented to: the potential risks and benefits of telemedicine (video visit) versus in person care; bill my insurance or make self-payment for services provided; and responsibility for payment of non-covered services.     Mode of Communication:  Video Conference via Doxy    As the provider I attest to compliance with applicable laws and regulations related to telemedicine.      [facility] Sexual Health -  Case Progress Note    Date of Service: 21   Name: Kd Slater  : 1995  Medical Record Number: [id number]  Treating Provider:Tenisha Stewart PsyD, LMFT  Type of Session: Individual  Present in Session: Tenisha Fuchs  Number of Minutes:  55    Current Symptoms/Status:  Client reported ongoing concerns related to gender and to general mood. Client endorsed depressed mood, including lack of motivation, feelings of worthlessness, judgment, negative self talk.     Progress Toward Treatment Goals:   Client reported that they have begun to \"like\" themselves and lean into affirming gender exploration. Client expressed questioning around future planning.   Had consultation with Dr. Redmond for HRT.  Came out to aunt and had a positive response. Asserted pronouns with some friends and family (mom) with positive response.       Intervention: Modality and Description:  Therapist utilized interpersonal, family systems, and narrative therapy techniques to explore clients, history of presenting problem, and current perceived stressors.    Explored updates around increased " depression and distress/fear/anxiety. Client shared that they recently quit smoking cannabis after daily use. Explored client desire to quit, fears around trans identity, safety, and feeling less than. Focused on how gender dysphoria interacts with substance use.     Plan/Need for Future Services:Continue estradiol and therapy    Assignment:  None given at this time.     DSM-5 Diagnoses:  Encounter Diagnoses   Name Primary?     Gender dysphoria in adolescent and adult      Adjustment disorder with depressed mood Yes       Return for therapy in 1-2 weeks to treat diagnosed problems.      Tenisha Stewart PsyD, LMFT

## 2021-03-04 NOTE — PROGRESS NOTES
"Video start time: 3:00 PM  Video end time: : 3:57 PM    Telemedicine Visit: The patient's condition can be safely assessed and treated via synchronous audio and visual telemedicine encounter.      Reason for Telemedicine Visit: Services only offered telehealth    Originating Site (Patient Location): Patient's home    Distant Site (Provider Location): [facility]: [facility]    Consent:  The patient/guardian has verbally consented to: the potential risks and benefits of telemedicine (video visit) versus in person care; bill my insurance or make self-payment for services provided; and responsibility for payment of non-covered services.     Mode of Communication:  Video Conference via Doxy    As the provider I attest to compliance with applicable laws and regulations related to telemedicine.      [facility] Sexual Health -  Case Progress Note    Date of Service: 3/04/21   Name: Kd Slater  : 1995  Medical Record Number: [id number]  Treating Provider:Tenisha Stewart PsyD, LMFT  Type of Session: Individual  Present in Session: Tenisha Fuchs  Number of Minutes:  57    Current Symptoms/Status:  Client reported ongoing concerns related to gender and to general mood. Client endorsed depressed mood, including lack of motivation, feelings of worthlessness, judgment, negative self talk.     Progress Toward Treatment Goals:   Client reported that they have begun to \"like\" themselves and lean into affirming gender exploration. Client expressed questioning around future planning.   Had consultation with Dr. Redmond for HRT.Client started HRT 2/15/2021    Came out to aunt and had a positive response. Asserted pronouns with some friends and family (mom) with positive response.       Intervention: Modality and Description:  Therapist utilized interpersonal, family systems, and narrative therapy techniques to explore clients, history of presenting problem, and current perceived " "stressors.  Explored updates around changes in mood, existential and social constructionist processing around gender and \"allowing experiences to not hold meaning.\"     Focused on deconstructing continued dysphoria and questioning around hormones.    Followed up on experience on hormones thus far and stopping cannabis use.    Plan/Need for Future Services:    Assignment:  None given at this time.     DSM-5 Diagnoses:  Encounter Diagnosis   Name Primary?     Gender dysphoria in adolescent and adult Yes           Return for therapy in 1-2 weeks to treat diagnosed problems.      Tenisha Stewart PsyD, LMFT  "

## 2021-03-18 ENCOUNTER — VIRTUAL VISIT (OUTPATIENT)
Dept: PSYCHOLOGY | Facility: CLINIC | Age: 26
End: 2021-03-18
Payer: COMMERCIAL

## 2021-03-18 DIAGNOSIS — F64.0 GENDER DYSPHORIA IN ADOLESCENT AND ADULT: Primary | ICD-10-CM

## 2021-03-18 DIAGNOSIS — F43.21 ADJUSTMENT DISORDER WITH DEPRESSED MOOD: ICD-10-CM

## 2021-03-18 PROCEDURE — 90837 PSYTX W PT 60 MINUTES: CPT | Mod: GT | Performed by: MARRIAGE & FAMILY THERAPIST

## 2021-03-18 PROCEDURE — 99207 PR NO BILLABLE SERVICE THIS VISIT: CPT | Performed by: MARRIAGE & FAMILY THERAPIST

## 2021-03-18 NOTE — PROGRESS NOTES
"Video start time: 3:05 PM  Video end time: : 3:57 PM    Telemedicine Visit: The patient's condition can be safely assessed and treated via synchronous audio and visual telemedicine encounter.      Reason for Telemedicine Visit: Services only offered telehealth    Originating Site (Patient Location): Patient's home    Distant Site (Provider Location): [facility]: [facility]    Consent:  The patient/guardian has verbally consented to: the potential risks and benefits of telemedicine (video visit) versus in person care; bill my insurance or make self-payment for services provided; and responsibility for payment of non-covered services.     Mode of Communication:  Video Conference via Doxy    As the provider I attest to compliance with applicable laws and regulations related to telemedicine.       Sexual Health -  Case Progress Note    Date of Service: 3/18/21   Name: Kd Slater  : 1995  Medical Record Number: [id number]  Treating Provider:Tenisha Stewart PsyD, LMFT  Type of Session: Individual  Present in Session: Tenisha Fuchs  Number of Minutes:  57    Current Symptoms/Status:  Client reported ongoing concerns related to gender and to general mood. Client endorsed depressed mood, including lack of motivation, feelings of worthlessness, judgment, negative self talk.     Progress Toward Treatment Goals:   Client reported that they have begun to \"like\" themselves and lean into affirming gender exploration. Client expressed questioning around future planning.   Had consultation with Dr. Redmond for HRT.Client started HRT 2/15/2021    Came out to aunt and had a positive response. Asserted pronouns with some friends and family (mom) with positive response.       Intervention: Modality and Description:  Therapist utilized interpersonal, family systems, and narrative therapy techniques to explore clients, history of presenting problem, and current perceived " "stressors.  Explored updates around changes in mood. Processed anxiety following experience of sex with roommate again, paranoia around STI potential due to unprotected sex.    Explored clients shifting in interpersonal relationships, friend group shutting client out, explored reasons why including role of gender transition, perfectionism/critical role in group. Explored shift in perspective and outlook related to transitioning and views of self (I had to let go of that person who tried so hard, who had something to prove).    Therapist highlighted shift in clients outlook, lessening of negative self-talk, judgement and shame. Explored \"old self\" and views around acceptance and ease.    Plan/Need for Future Services:    Assignment:  None given at this time.     DSM-5 Diagnoses:  Encounter Diagnoses   Name Primary?     Adjustment disorder with depressed mood      Gender dysphoria in adolescent and adult Yes           Return for therapy in 1-2 weeks to treat diagnosed problems.      Tenisha Stewart PsyD, LMFT  "

## 2021-03-25 ENCOUNTER — VIRTUAL VISIT (OUTPATIENT)
Dept: PSYCHOLOGY | Facility: CLINIC | Age: 26
End: 2021-03-25
Payer: COMMERCIAL

## 2021-03-25 DIAGNOSIS — F43.21 ADJUSTMENT DISORDER WITH DEPRESSED MOOD: ICD-10-CM

## 2021-03-25 DIAGNOSIS — F64.0 GENDER DYSPHORIA IN ADOLESCENT AND ADULT: Primary | ICD-10-CM

## 2021-03-25 PROCEDURE — 90834 PSYTX W PT 45 MINUTES: CPT | Mod: GT | Performed by: MARRIAGE & FAMILY THERAPIST

## 2021-03-25 PROCEDURE — 99207 PR NO BILLABLE SERVICE THIS VISIT: CPT | Performed by: MARRIAGE & FAMILY THERAPIST

## 2021-03-25 NOTE — PROGRESS NOTES
"Video start time: 3:10 PM  Video end time: : 3:57 PM    Telemedicine Visit: The patient's condition can be safely assessed and treated via synchronous audio and visual telemedicine encounter.      Reason for Telemedicine Visit: Services only offered telehealth    Originating Site (Patient Location): Patient's home    Distant Site (Provider Location): [facility]: [facility]    Consent:  The patient/guardian has verbally consented to: the potential risks and benefits of telemedicine (video visit) versus in person care; bill my insurance or make self-payment for services provided; and responsibility for payment of non-covered services.     Mode of Communication:  Video Conference via Doxy    As the provider I attest to compliance with applicable laws and regulations related to telemedicine.      [facility] Sexual Health -  Case Progress Note    Date of Service: 3/25/21   Name: Kd Slater  : 1995  Medical Record Number: [id number]  Treating Provider:Tenisha Stewart PsyD, LMFT  Type of Session: Individual  Present in Session: Tenisha Fuchs  Number of Minutes:  47    Current Symptoms/Status:  Client reported ongoing concerns related to gender and to general mood. Client endorsed depressed mood, including lack of motivation, feelings of worthlessness, judgment, negative self talk.     Progress Toward Treatment Goals:   Client reported that they have begun to \"like\" themselves and lean into affirming gender exploration. Client expressed questioning around future planning.   Had consultation with Dr. Redmond for HRT.Client started HRT 2/15/2021    Came out to aunt and had a positive response. Asserted pronouns with some friends and family (mom) with positive response.       Intervention: Modality and Description:  Therapist utilized interpersonal, family systems, and narrative therapy techniques to explore clients, history of presenting problem, and current perceived " stressors.  Explored updates around changes of dysphoria since starting HRT and perceived positive changes related to outlook, mood and physical changes.     Continued to process interpersonal relationships, patterns of anxiety and questions around boundary setting.    Plan/Need for Future Services:    Assignment:  None given at this time.     DSM-5 Diagnoses:  Encounter Diagnoses   Name Primary?     Gender dysphoria in adolescent and adult Yes     Adjustment disorder with depressed mood            Return for therapy in 1-2 weeks to treat diagnosed problems.      Tenisha Stewart PsyD, LMFT

## 2021-04-06 ENCOUNTER — VIRTUAL VISIT (OUTPATIENT)
Dept: PSYCHOLOGY | Facility: CLINIC | Age: 26
End: 2021-04-06
Payer: COMMERCIAL

## 2021-04-06 DIAGNOSIS — F43.21 ADJUSTMENT DISORDER WITH DEPRESSED MOOD: ICD-10-CM

## 2021-04-06 DIAGNOSIS — F64.0 GENDER DYSPHORIA IN ADOLESCENT AND ADULT: Primary | ICD-10-CM

## 2021-04-06 PROCEDURE — 90837 PSYTX W PT 60 MINUTES: CPT | Mod: GT | Performed by: MARRIAGE & FAMILY THERAPIST

## 2021-04-06 PROCEDURE — 99207 PR NO BILLABLE SERVICE THIS VISIT: CPT | Performed by: MARRIAGE & FAMILY THERAPIST

## 2021-04-06 NOTE — PROGRESS NOTES
"Video start time: 3:02 PM  Video end time: : 3:57 PM    Telemedicine Visit: The patient's condition can be safely assessed and treated via synchronous audio and visual telemedicine encounter.      Reason for Telemedicine Visit: Services only offered telehealth    Originating Site (Patient Location): Patient's home    Distant Site (Provider Location): [facility]: [facility]    Consent:  The patient/guardian has verbally consented to: the potential risks and benefits of telemedicine (video visit) versus in person care; bill my insurance or make self-payment for services provided; and responsibility for payment of non-covered services.     Mode of Communication:  Video Conference via Doxy    As the provider I attest to compliance with applicable laws and regulations related to telemedicine.      [facility] Sexual Health -  Case Progress Note    Date of Service: 21   Name: Kd Slater  : 1995  Medical Record Number: [id number]  Treating Provider:Tenisha Stewart PsyD, LMFT  Type of Session: Individual  Present in Session: Tenisha Fuchs  Number of Minutes:  55    Current Symptoms/Status:  Client reported ongoing concerns related to gender and to general mood. Client endorsed depressed mood, including lack of motivation, feelings of worthlessness, judgment, negative self talk.     Progress Toward Treatment Goals:   Client reported that they have begun to \"like\" themselves and lean into affirming gender exploration. Client expressed questioning around future planning.   Had consultation with Dr. Redmond for HRT.Client started HRT 2/15/2021    Came out to aunt and had a positive response. Asserted pronouns with some friends and family (mom) with positive response.       Intervention: Modality and Description:  Therapist utilized interpersonal, family systems, and narrative therapy techniques to explore clients, history of presenting problem, and current perceived " stressors.  Explored updates around changes of dysphoria since starting HRT and perceived stressors. Explored feelings related to roommate, friendships and family of origin. Focused on processing changes to perceptions of self re: HRT.     Plan/Need for Future Services:    Assignment:  None given at this time.     DSM-5 Diagnoses:  Encounter Diagnoses   Name Primary?     Gender dysphoria in adolescent and adult Yes     Adjustment disorder with depressed mood        Return for therapy in 1-2 weeks to treat diagnosed problems.      Tenisha Stewart PsyD, LMFT

## 2021-04-08 ENCOUNTER — VIRTUAL VISIT (OUTPATIENT)
Dept: FAMILY MEDICINE | Facility: CLINIC | Age: 26
End: 2021-04-08
Payer: COMMERCIAL

## 2021-04-08 DIAGNOSIS — F64.0 GENDER DYSPHORIA IN ADOLESCENT AND ADULT: Primary | ICD-10-CM

## 2021-04-08 PROCEDURE — 99214 OFFICE O/P EST MOD 30 MIN: CPT | Mod: 95 | Performed by: FAMILY MEDICINE

## 2021-04-08 RX ORDER — ESTRADIOL 2 MG/1
4 TABLET ORAL DAILY
Qty: 60 TABLET | Refills: 3 | Status: SHIPPED | OUTPATIENT
Start: 2021-04-08 | End: 2021-05-03

## 2021-04-08 RX ORDER — SPIRONOLACTONE 100 MG/1
100 TABLET, FILM COATED ORAL DAILY
Qty: 30 TABLET | Refills: 2 | Status: SHIPPED | OUTPATIENT
Start: 2021-04-08 | End: 2021-07-01

## 2021-04-08 ASSESSMENT — ENCOUNTER SYMPTOMS
UNEXPECTED WEIGHT CHANGE: 0
FEVER: 0
SHORTNESS OF BREATH: 0
CHILLS: 0

## 2021-04-08 NOTE — PROGRESS NOTES
The patient has been notified of following:       Patient has given verbal consent for Video visit? Yes    Patient would like the video invitation sent by: Other e-mail: [employer]    Video Start Time: 3:31 PM              SHELBIE Fuchs is a 25 year old individual that uses pronouns She/Her/Hers/Herself that presents today for follow up of:  feminizing hormone therapy.   Alone or accompanied by: accompanied today by  Gender identity: female  Started Hormone  therapy  2/2021  Continues on Estrace 2* mg daily   Any special concerns today?    No concerns, tolerating well  On hormones?  YES +++ Shot day of the week? Not applicable-taking pills/patch/gel      Due for labs?  Yes      +++ Refills of meds needed?  Yes  Gender related body changes since last visit:   Feel in control of emotions  Facial changes  Softer skin    Breakthrough bleeding? Does Not Apply    New health concerns since last visit:  ---none    No past surgical history on file.    There is no problem list on file for this patient.      Current Outpatient Medications   Medication Sig Dispense Refill     estradiol (ESTRACE) 2 MG tablet Take 1 tablet (2 mg) by mouth daily 30 tablet 1       History   Smoking Status     Former Smoker     Packs/day: 0.05     Quit date: 2/12/2021   Smokeless Tobacco     Never Used        No Known Allergies    Health Maintenance Due   Topic Date Due     MENTAL HEALTH TX PLAN  04/06/2021         Problem, Medication and Allergy Lists were reviewed and are current..         Review of Systems:   Review of Systems   Constitutional: Negative for chills, fever and unexpected weight change.   Respiratory: Negative for shortness of breath.    Cardiovascular: Negative for chest pain and leg swelling.              Labs:   Results from last visit:  Orders Only on 02/10/2021   Component Date Value Ref Range Status     Sodium 02/10/2021 140  133 - 144 mmol/L Final     Potassium 02/10/2021 4.3  3.4 - 5.3 mmol/L Final     Chloride 02/10/2021 108  94  - 109 mmol/L Final     Carbon Dioxide 02/10/2021 30  20 - 32 mmol/L Final     Anion Gap 02/10/2021 3  3 - 14 mmol/L Final     Glucose 02/10/2021 92  70 - 99 mg/dL Final     Urea Nitrogen 02/10/2021 8  7 - 30 mg/dL Final     Creatinine 02/10/2021 0.66  0.66 - 1.25 mg/dL Final     GFR Estimate 02/10/2021 >90  >60 mL/min/[1.73_m2] Final    Comment: Non  GFR Calc  Starting 12/18/2018, serum creatinine based estimated GFR (eGFR) will be   calculated using the Chronic Kidney Disease Epidemiology Collaboration   (CKD-EPI) equation.       GFR Estimate If Black 02/10/2021 >90  >60 mL/min/[1.73_m2] Final    Comment:  GFR Calc  Starting 12/18/2018, serum creatinine based estimated GFR (eGFR) will be   calculated using the Chronic Kidney Disease Epidemiology Collaboration   (CKD-EPI) equation.       Calcium 02/10/2021 9.2  8.5 - 10.1 mg/dL Final     Bilirubin Total 02/10/2021 0.5  0.2 - 1.3 mg/dL Final     Albumin 02/10/2021 4.4  3.4 - 5.0 g/dL Final     Protein Total 02/10/2021 7.8  6.8 - 8.8 g/dL Final     Alkaline Phosphatase 02/10/2021 91  40 - 150 U/L Final     ALT 02/10/2021 24  0 - 70 U/L Final     AST 02/10/2021 16  0 - 45 U/L Final     Cholesterol 02/10/2021 153  <200 mg/dL Final     Triglycerides 02/10/2021 45  <150 mg/dL Final     HDL Cholesterol 02/10/2021 74  >39 mg/dL Final     LDL Cholesterol Calculated 02/10/2021 70  <100 mg/dL Final    Desirable:       <100 mg/dl     Non HDL Cholesterol 02/10/2021 79  <130 mg/dL Final     Testosterone Total 02/10/2021 825  240 - 950 ng/dL Final    Comment: This test was developed and its performance characteristics determined by the   [facility].   It has not been cleared or approved by the FDA. The laboratory is regulated   under CLIA as qualified to perform high-complexity testing. This test is used   for clinical purposes. It should not be regarded as investigational or for    research.       Sex Hormone Binding Globulin 02/10/2021 46  11 - 80 nmol/L Final     Free Testosterone Calculated 02/10/2021 15.44  4.7 - 24.4 ng/dL Final         EXAM:  Constitutional: healthy, alert and no distress   Psychiatric: mentation appears normal and affect normal/bright     Assessment and Plan   1. Gender dysphoria   Tolerating estradiol well    Increase to 4 mg estradiol  Start spironolactone 100 mg daily, instructed in going slowly from sit to stand, adequate hydration  Lab: BMP, testosterone in 1 month    Follow-up 3  months           Video-Visit Details    Type of service:  Video Visit    Video End Time (time video stopped): 337    Originating Location (pt. Location): Home    Distant Location (provider location):  [facility]     Mode of Communication:  Video Conference via video platform: Latrice Redmond MD        [facility]  Questions were elicited and answered.     Patricio Redmond MD

## 2021-04-20 ENCOUNTER — VIRTUAL VISIT (OUTPATIENT)
Dept: PSYCHOLOGY | Facility: CLINIC | Age: 26
End: 2021-04-20
Payer: COMMERCIAL

## 2021-04-20 DIAGNOSIS — F64.0 GENDER DYSPHORIA IN ADOLESCENT AND ADULT: ICD-10-CM

## 2021-04-20 DIAGNOSIS — F43.21 ADJUSTMENT DISORDER WITH DEPRESSED MOOD: Primary | ICD-10-CM

## 2021-04-20 PROCEDURE — 90837 PSYTX W PT 60 MINUTES: CPT | Mod: GT | Performed by: MARRIAGE & FAMILY THERAPIST

## 2021-05-04 ENCOUNTER — VIRTUAL VISIT (OUTPATIENT)
Dept: PSYCHOLOGY | Facility: CLINIC | Age: 26
End: 2021-05-04
Payer: COMMERCIAL

## 2021-05-04 DIAGNOSIS — F64.0 GENDER DYSPHORIA IN ADOLESCENT AND ADULT: Primary | ICD-10-CM

## 2021-05-04 DIAGNOSIS — F43.21 ADJUSTMENT DISORDER WITH DEPRESSED MOOD: ICD-10-CM

## 2021-05-04 PROCEDURE — 99207 PR NO BILLABLE SERVICE THIS VISIT: CPT | Performed by: MARRIAGE & FAMILY THERAPIST

## 2021-05-04 PROCEDURE — 90837 PSYTX W PT 60 MINUTES: CPT | Mod: GT | Performed by: MARRIAGE & FAMILY THERAPIST

## 2021-05-04 NOTE — PROGRESS NOTES
"Video start time: 3:02 PM  Video end time: : 3:57 PM    Telemedicine Visit: The patient's condition can be safely assessed and treated via synchronous audio and visual telemedicine encounter.      Reason for Telemedicine Visit: Services only offered telehealth    Originating Site (Patient Location): Patient's home    Distant Site (Provider Location): [facility]    Consent:  The patient/guardian has verbally consented to: the potential risks and benefits of telemedicine (video visit) versus in person care; bill my insurance or make self-payment for services provided; and responsibility for payment of non-covered services.     Mode of Communication:  Video Conference via [employer]    As the provider I attest to compliance with applicable laws and regulations related to telemedicine.      [facility] Sexual Health -  Case Progress Note    Date of Service: 21   Name: Kd Slater  : 1995  Medical Record Number: [id number]  Treating Provider:Tenisha Stewart PsyD, LMFT  Type of Session: Individual  Present in Session: Tenisha Fuchs  Number of Minutes:  55    Current Symptoms/Status:  Client reported ongoing concerns related to gender and to general mood. Client endorsed depressed mood, including lack of motivation, feelings of worthlessness, judgment, negative self talk.     Progress Toward Treatment Goals:   Client reported that they have begun to \"like\" themselves and lean into affirming gender exploration.     Client started HRT 2/15/2021    Client got promoted at work, may be distancing from person she was dating. Continued conflict with roommate/ex-partner.    Intervention: Modality and Description:  Therapist utilized interpersonal, family systems, and narrative therapy techniques to explore clients, history of presenting problem, and current perceived stressors.    Explored recent ups and downs related to interpersonal distress with roommate re: history of " "relationship.    Explored depressive symptoms, feelings of being \"out of body\" and dissociating. Client shared that she has been dissociating and not \"feeling my feelings\" until 3 years ago.      Existential loneliness, core belief \"if I don't do it myself it won't get done.\" Other people are going to lie, can't be trusted, easier to sabotage rather than try to work with/deal with people. Explored pattern of people who client loves the most being the people that hurt her the most.    Explored role of HRT in emotional lability and perceptions of \"second puberty\"      Plan/Need for Future Services:    Assignment:  None given at this time.     DSM-5 Diagnoses:  Encounter Diagnoses   Name Primary?     Gender dysphoria in adolescent and adult Yes     Adjustment disorder with depressed mood      Return for therapy in 1-2 weeks to treat diagnosed problems.      Tenisha Stewart PsyD, LMFT  "

## 2021-05-04 NOTE — PROGRESS NOTES
"Video start time: 3:02 PM  Video end time: : 3:57 PM    Telemedicine Visit: The patient's condition can be safely assessed and treated via synchronous audio and visual telemedicine encounter.      Reason for Telemedicine Visit: Services only offered telehealth    Originating Site (Patient Location): Patient's home    Distant Site (Provider Location): [facility]: [facility]    Consent:  The patient/guardian has verbally consented to: the potential risks and benefits of telemedicine (video visit) versus in person care; bill my insurance or make self-payment for services provided; and responsibility for payment of non-covered services.     Mode of Communication:  Video Conference via Doxy    As the provider I attest to compliance with applicable laws and regulations related to telemedicine.       Sexual Health -  Case Progress Note    Date of Service: 21   Name: Kd Slater  : 1995  Medical Record Number: [id number]  Treating Provider:Tenisha Stewart PsyD, LMFT  Type of Session: Individual  Present in Session: Tenisha Fuchs  Number of Minutes:  55    Current Symptoms/Status:  Client reported ongoing concerns related to gender and to general mood. Client endorsed depressed mood, including lack of motivation, feelings of worthlessness, judgment, negative self talk.     Progress Toward Treatment Goals:   Client reported that they have begun to \"like\" themselves and lean into affirming gender exploration. Client expressed questioning around future planning.   Had consultation with Dr. Redmond for HRT.Client started HRT 2/15/2021    Came out to aunt and had a positive response. Asserted pronouns with some friends and family (mom) with positive response.       Intervention: Modality and Description:  Therapist utilized interpersonal, family systems, and narrative therapy techniques to explore clients, history of presenting problem, and current perceived " stressors.    Explored client experience around social unrest, Jose C trial and changes to situation with roommate. Processed client recent dating, feeling not good enough, questioning worth and dysphoria.      Plan/Need for Future Services:    Assignment:  None given at this time.     DSM-5 Diagnoses:  Encounter Diagnoses   Name Primary?     Gender dysphoria in adolescent and adult      Adjustment disorder with depressed mood Yes         Return for therapy in 1-2 weeks to treat diagnosed problems.      Tenisha Stewart PsyD, LMFT

## 2021-05-10 ENCOUNTER — IMMUNIZATION (OUTPATIENT)
Dept: LAB | Facility: CLINIC | Age: 26
End: 2021-05-10
Payer: COMMERCIAL

## 2021-05-13 DIAGNOSIS — F64.0 GENDER DYSPHORIA IN ADOLESCENT AND ADULT: ICD-10-CM

## 2021-05-13 LAB
ANION GAP SERPL CALCULATED.3IONS-SCNC: 6 MMOL/L (ref 3–14)
BUN SERPL-MCNC: 9 MG/DL (ref 7–30)
CALCIUM SERPL-MCNC: 9.1 MG/DL (ref 8.5–10.1)
CHLORIDE SERPL-SCNC: 106 MMOL/L (ref 94–109)
CO2 SERPL-SCNC: 27 MMOL/L (ref 20–32)
CREAT SERPL-MCNC: 0.59 MG/DL (ref 0.66–1.25)
GFR SERPL CREATININE-BSD FRML MDRD: >90 ML/MIN/{1.73_M2}
GLUCOSE SERPL-MCNC: 88 MG/DL (ref 70–99)
POTASSIUM SERPL-SCNC: 4.1 MMOL/L (ref 3.4–5.3)
SODIUM SERPL-SCNC: 139 MMOL/L (ref 133–144)

## 2021-05-15 LAB
SHBG SERPL-SCNC: 34 NMOL/L (ref 11–80)
TESTOST FREE SERPL-MCNC: 12.8 NG/DL (ref 4.7–24.4)
TESTOST SERPL-MCNC: 602 NG/DL (ref 240–950)

## 2021-05-27 NOTE — RESULT ENCOUNTER NOTE
Dear Jef,     Here are your recent results. There has been a mild decrease in your testosterone level; your kidney panel is normal. Let me know if you have missed doses of your medication and we can adjust your medications at your next visit    Please let us know if you have any questions or concerns.    Regards,  Patricio Redmond MD

## 2021-06-03 ENCOUNTER — VIRTUAL VISIT (OUTPATIENT)
Dept: PSYCHOLOGY | Facility: CLINIC | Age: 26
End: 2021-06-03
Payer: COMMERCIAL

## 2021-06-03 DIAGNOSIS — F64.0 GENDER DYSPHORIA IN ADOLESCENT AND ADULT: Primary | ICD-10-CM

## 2021-06-03 PROCEDURE — 90837 PSYTX W PT 60 MINUTES: CPT | Mod: GT | Performed by: MARRIAGE & FAMILY THERAPIST

## 2021-06-03 PROCEDURE — 99207 PR NO BILLABLE SERVICE THIS VISIT: CPT | Performed by: MARRIAGE & FAMILY THERAPIST

## 2021-06-03 NOTE — PROGRESS NOTES
"Video start time: 2:02 PM  Video end time: : 2:57 PM    Telemedicine Visit: The patient's condition can be safely assessed and treated via synchronous audio and visual telemedicine encounter.      Reason for Telemedicine Visit: Services only offered telehealth    Originating Site (Patient Location): Patient's home    Distant Site (Provider Location): [facility]: [facility]    Consent:  The patient/guardian has verbally consented to: the potential risks and benefits of telemedicine (video visit) versus in person care; bill my insurance or make self-payment for services provided; and responsibility for payment of non-covered services.     Mode of Communication:  Video Conference via [employer]    As the provider I attest to compliance with applicable laws and regulations related to telemedicine.      [facility] Sexual Health -  Case Progress Note    Date of Service: 21   Name: Kd Slater  : 1995  Medical Record Number: [id number]  Treating Provider:Tenisha Stewart PsyD, LMFT  Type of Session: Individual  Present in Session: Tenisha Fuchs  Number of Minutes:  55    Current Symptoms/Status:  Client reported ongoing concerns related to gender and to general mood. Client endorsed depressed mood, including lack of motivation, feelings of worthlessness, judgment, negative self talk.     Progress Toward Treatment Goals:   Client reported that they have begun to \"like\" themselves and lean into affirming gender exploration.     Client started HRT 2/15/2021     Client got promoted at work, may be distancing from person she was dating. Continued conflict with roommate/ex-partner.    Intervention: Modality and Description:  Therapist utilized interpersonal, family systems, and narrative therapy techniques to explore clients, history of presenting problem, and current perceived stressors. Explored client updates around questioning new relationship, self doubt and loathing, and uncertainty " related to continued HRT and transition. Therapist held space to process meaning making around key events and how to move through.     Plan/Need for Future Services:    Assignment:  None given at this time.     DSM-5 Diagnoses:  Encounter Diagnosis   Name Primary?     Gender dysphoria in adolescent and adult Yes       Return for therapy in 1-2 weeks to treat diagnosed problems.      Tenisha Stewart PsyD, LMFT

## 2021-07-01 ENCOUNTER — VIRTUAL VISIT (OUTPATIENT)
Dept: FAMILY MEDICINE | Facility: CLINIC | Age: 26
End: 2021-07-01
Payer: COMMERCIAL

## 2021-07-01 ENCOUNTER — VIRTUAL VISIT (OUTPATIENT)
Dept: PSYCHOLOGY | Facility: CLINIC | Age: 26
End: 2021-07-01
Payer: COMMERCIAL

## 2021-07-01 DIAGNOSIS — F64.0 GENDER DYSPHORIA IN ADOLESCENT AND ADULT: Primary | ICD-10-CM

## 2021-07-01 DIAGNOSIS — F43.21 ADJUSTMENT DISORDER WITH DEPRESSED MOOD: ICD-10-CM

## 2021-07-01 DIAGNOSIS — F64.0 GENDER DYSPHORIA IN ADOLESCENT AND ADULT: ICD-10-CM

## 2021-07-01 PROCEDURE — 90837 PSYTX W PT 60 MINUTES: CPT | Mod: GT | Performed by: MARRIAGE & FAMILY THERAPIST

## 2021-07-01 PROCEDURE — 99207 PR NO BILLABLE SERVICE THIS VISIT: CPT | Performed by: MARRIAGE & FAMILY THERAPIST

## 2021-07-01 PROCEDURE — 99214 OFFICE O/P EST MOD 30 MIN: CPT | Mod: GT | Performed by: FAMILY MEDICINE

## 2021-07-01 RX ORDER — SPIRONOLACTONE 100 MG/1
200 TABLET, FILM COATED ORAL DAILY
Qty: 60 TABLET | Refills: 3 | Status: SHIPPED | OUTPATIENT
Start: 2021-07-01 | End: 2021-09-30

## 2021-07-01 RX ORDER — ESTRADIOL 2 MG/1
6 TABLET ORAL DAILY
Qty: 90 TABLET | Refills: 3 | Status: SHIPPED | OUTPATIENT
Start: 2021-07-01 | End: 2021-07-26

## 2021-07-01 ASSESSMENT — ENCOUNTER SYMPTOMS
FEVER: 0
LIGHT-HEADEDNESS: 0
CHILLS: 0
SHORTNESS OF BREATH: 0
UNEXPECTED WEIGHT CHANGE: 0

## 2021-07-01 NOTE — PROGRESS NOTES
"Video start time: 2:30 PM  Video end time: : 3:30 PM    Telemedicine Visit: The patient's condition can be safely assessed and treated via synchronous audio and visual telemedicine encounter.      Reason for Telemedicine Visit: Services only offered telehealth    Originating Site (Patient Location): Patient's home    Distant Site (Provider Location): [facility]: [facility]    Consent:  The patient/guardian has verbally consented to: the potential risks and benefits of telemedicine (video visit) versus in person care; bill my insurance or make self-payment for services provided; and responsibility for payment of non-covered services.     Mode of Communication:  Video Conference via [employer]oxy    As the provider I attest to compliance with applicable laws and regulations related to telemedicine.      [facility] Sexual Health -  Case Progress Note    Date of Service: 21   Name: Kd Slater  : 1995  Medical Record Number: [id number]  Treating Provider:Tenisha Stewart PsyD, LMFT  Type of Session: Individual  Present in Session: Tenisha Fuchs  Number of Minutes:  60    Current Symptoms/Status:  Client reported ongoing concerns related to gender and to general mood. Client endorsed depressed mood, including lack of motivation, feelings of worthlessness, judgment, negative self talk.     Progress Toward Treatment Goals:   Client reported that they have begun to \"like\" themselves and lean into affirming gender exploration.     Client started HRT 2/15/2021     Intervention: Modality and Description:  Therapist utilized interpersonal, family systems, and narrative therapy techniques to explore clients, history of presenting problem, and current perceived stressors. Explored client updates around ending of relationship, sadness and grief. Processed client confusion about relationship loss and experiences around dysphoria. Shared resources around voice therapy.    Plan/Need for Future " Services:    Assignment:  None given at this time.     DSM-5 Diagnoses:  Encounter Diagnoses   Name Primary?     Gender dysphoria in adolescent and adult Yes     Adjustment disorder with depressed mood        Return for therapy in 1-2 weeks to treat diagnosed problems.      Tensiha Stewart PsyD, LMFT

## 2021-07-01 NOTE — PROGRESS NOTES
The patient has been notified of following:       Patient has given verbal consent for Video visit? Yes    Patient would like the video invitation sent by: Other e-mail: [employer]    Video Start Time: 4:19 PM              SHELBIE Fuchs is a 25 year old individual that uses pronouns She/Her/Hers/Herself that presents today for follow up of:  feminizing hormone therapy.   Alone or accompanied by: accompanied today by  Gender identity: female  Started Hormone  therapy  /2021  Continues on Estrace 4* mg daily  and Spironlactone 100* mg daily   Any special concerns today?    Experiencing body changes, going well  Had been off spironolactone due to refill problem x 1 month    On hormones?  YES +++ Shot day of the week? Not applicable-taking pills/patch/gel      Due for labs?  Yes      +++ Refills of meds needed?  Yes  Gender related body changes since last visit:   Breast growth  Some body shape changes  Feel things more emotionally, senses different    Breakthrough bleeding? Does Not Apply    New health concerns since last visit:  ---none    No past surgical history on file.    There is no problem list on file for this patient.      Current Outpatient Medications   Medication Sig Dispense Refill     estradiol (ESTRACE) 2 MG tablet Take 2 tablets (4 mg) by mouth daily 180 tablet 0     spironolactone (ALDACTONE) 100 MG tablet Take 1 tablet (100 mg) by mouth daily (Patient not taking: Reported on 6/30/2021) 30 tablet 2       History   Smoking Status     Former Smoker     Packs/day: 0.05     Quit date: 2/12/2021   Smokeless Tobacco     Never Used        No Known Allergies    Health Maintenance Due   Topic Date Due     MENTAL HEALTH TX PLAN  04/06/2021         Problem, Medication and Allergy Lists were reviewed and are current..         Review of Systems:   Review of Systems   Constitutional: Negative for chills, fever and unexpected weight change.   Respiratory: Negative for shortness of breath.    Cardiovascular: Negative for  chest pain and leg swelling.   Neurological: Negative for light-headedness.              Labs:   Results from last visit:  Orders Only on 05/13/2021   Component Date Value Ref Range Status     Testosterone Total 05/13/2021 602  240 - 950 ng/dL Final    Comment: This test was developed and its performance characteristics determined by the   [facility].   It has not been cleared or approved by the FDA. The laboratory is regulated   under CLIA as qualified to perform high-complexity testing. This test is used   for clinical purposes. It should not be regarded as investigational or for   research.       Sex Hormone Binding Globulin 05/13/2021 34  11 - 80 nmol/L Final     Free Testosterone Calculated 05/13/2021 12.80  4.7 - 24.4 ng/dL Final     Sodium 05/13/2021 139  133 - 144 mmol/L Final     Potassium 05/13/2021 4.1  3.4 - 5.3 mmol/L Final     Chloride 05/13/2021 106  94 - 109 mmol/L Final     Carbon Dioxide 05/13/2021 27  20 - 32 mmol/L Final     Anion Gap 05/13/2021 6  3 - 14 mmol/L Final     Glucose 05/13/2021 88  70 - 99 mg/dL Final     Urea Nitrogen 05/13/2021 9  7 - 30 mg/dL Final     Creatinine 05/13/2021 0.59* 0.66 - 1.25 mg/dL Final     GFR Estimate 05/13/2021 >90  >60 mL/min/[1.73_m2] Final    Comment: Non  GFR Calc  Starting 12/18/2018, serum creatinine based estimated GFR (eGFR) will be   calculated using the Chronic Kidney Disease Epidemiology Collaboration   (CKD-EPI) equation.       GFR Estimate If Black 05/13/2021 >90  >60 mL/min/[1.73_m2] Final    Comment:  GFR Calc  Starting 12/18/2018, serum creatinine based estimated GFR (eGFR) will be   calculated using the Chronic Kidney Disease Epidemiology Collaboration   (CKD-EPI) equation.       Calcium 05/13/2021 9.1  8.5 - 10.1 mg/dL Final         EXAM:  Constitutional: healthy, alert and no distress   Psychiatric: mentation appears normal and affect  normal/bright     Assessment and Plan   1. Gender dysphoria  Reviewed labs with patient  Testosterone not suppressed on current hormone regimen  Increase to estradiol 6 mg daily, sprionolactone 200 mg daily  Labs: testosterone, estradiol, CMP  in 1month    Follow-up 3 months           Video-Visit Details    Type of service:  Video Visit    Video End Time (time video stopped): 427      Originating Location (pt. Location): Home    Distant Location (provider location):  [facility]     Mode of Communication:  Video Conference via video platform: Latrice Remdond MD        Results by idalia  Questions were elicited and answered.     Patricio Redmond MD

## 2021-07-20 ENCOUNTER — VIRTUAL VISIT (OUTPATIENT)
Dept: PSYCHOLOGY | Facility: CLINIC | Age: 26
End: 2021-07-20
Payer: COMMERCIAL

## 2021-07-20 DIAGNOSIS — F43.21 ADJUSTMENT DISORDER WITH DEPRESSED MOOD: ICD-10-CM

## 2021-07-20 DIAGNOSIS — F64.0 GENDER DYSPHORIA IN ADOLESCENT AND ADULT: Primary | ICD-10-CM

## 2021-07-20 PROCEDURE — 90837 PSYTX W PT 60 MINUTES: CPT | Mod: GT | Performed by: MARRIAGE & FAMILY THERAPIST

## 2021-07-20 PROCEDURE — 99207 PR NO BILLABLE SERVICE THIS VISIT: CPT | Performed by: MARRIAGE & FAMILY THERAPIST

## 2021-07-20 NOTE — PROGRESS NOTES
"Video start time: 3:05 PM  Video end time: :  4:00 PM    Telemedicine Visit: The patient's condition can be safely assessed and treated via synchronous audio and visual telemedicine encounter.      Reason for Telemedicine Visit: Services only offered telehealth    Originating Site (Patient Location): Patient's home    Distant Site (Provider Location): [facility]: [facility]    Consent:  The patient/guardian has verbally consented to: the potential risks and benefits of telemedicine (video visit) versus in person care; bill my insurance or make self-payment for services provided; and responsibility for payment of non-covered services.     Mode of Communication:  Video Conference via [employer]    As the provider I attest to compliance with applicable laws and regulations related to telemedicine.      [facility] Sexual Health -  Case Progress Note    Date of Service: 21   Name: Kd Slater  : 1995  Medical Record Number: [id number]  Treating Provider:Tenisha Stewart PsyD, LMFT  Type of Session: Individual  Present in Session: Tenisha Fuchs  Number of Minutes:  55    Current Symptoms/Status:  Client reported ongoing concerns related to gender and to general mood. Client endorsed depressed mood, including lack of motivation, feelings of worthlessness, judgment, negative self talk.     Progress Toward Treatment Goals:   Client reported that they have begun to \"like\" themselves and lean into affirming gender exploration.     Client started HRT 2/15/2021     Intervention: Modality and Description:  Therapist utilized interpersonal, family systems, and narrative therapy techniques to explore clients, history of presenting problem, and current perceived stressors. Explored client updates around questioning gender, physical changes related to HRT and confusion with family of origin, patterns of alcoholism.     Plan/Need for Future Services:    Assignment:  None given at this time. "     DSM-5 Diagnoses:  Encounter Diagnoses   Name Primary?     Gender dysphoria in adolescent and adult Yes     Adjustment disorder with depressed mood          Return for therapy in 1-2 weeks to treat diagnosed problems.      Tenisha Stewart PsyD, LMFT

## 2021-07-25 DIAGNOSIS — F64.0 GENDER DYSPHORIA IN ADOLESCENT AND ADULT: ICD-10-CM

## 2021-07-26 RX ORDER — ESTRADIOL 2 MG/1
6 TABLET ORAL DAILY
Qty: 270 TABLET | Refills: 2 | Status: SHIPPED | OUTPATIENT
Start: 2021-07-26 | End: 2021-09-30

## 2021-08-03 ENCOUNTER — VIRTUAL VISIT (OUTPATIENT)
Dept: PSYCHOLOGY | Facility: CLINIC | Age: 26
End: 2021-08-03
Payer: COMMERCIAL

## 2021-08-03 DIAGNOSIS — F43.21 ADJUSTMENT DISORDER WITH DEPRESSED MOOD: ICD-10-CM

## 2021-08-03 DIAGNOSIS — F64.0 GENDER DYSPHORIA IN ADOLESCENT AND ADULT: Primary | ICD-10-CM

## 2021-08-03 PROCEDURE — 90837 PSYTX W PT 60 MINUTES: CPT | Mod: GT | Performed by: MARRIAGE & FAMILY THERAPIST

## 2021-08-03 PROCEDURE — 99207 PR NO BILLABLE SERVICE THIS VISIT: CPT | Performed by: MARRIAGE & FAMILY THERAPIST

## 2021-08-03 NOTE — PROGRESS NOTES
"Video start time: 3:05 PM  Video end time: :  4:00 PM    Telemedicine Visit: The patient's condition can be safely assessed and treated via synchronous audio and visual telemedicine encounter.      Reason for Telemedicine Visit: Services only offered telehealth    Originating Site (Patient Location): Patient's home    Distant Site (Provider Location): [facility]: [facility]    Consent:  The patient/guardian has verbally consented to: the potential risks and benefits of telemedicine (video visit) versus in person care; bill my insurance or make self-payment for services provided; and responsibility for payment of non-covered services.     Mode of Communication:  Video Conference via Doxy    As the provider I attest to compliance with applicable laws and regulations related to telemedicine.      [facility] Sexual Health -  Case Progress Note    Date of Service: 21   Name: Kd GEN Slater  : 1995  Medical Record Number: [id number]  Treating Provider:Tenisha Stewart PsyD, LMFT  Type of Session: Individual  Present in Session: Tenisha Fuchs  Number of Minutes:  55    Current Symptoms/Status:  Client reported ongoing concerns related to gender and to general mood. Client endorsed depressed mood, including lack of motivation, feelings of worthlessness, judgment, negative self talk.     Progress Toward Treatment Goals:   Client reported that they have begun to \"like\" themselves and lean into affirming gender exploration.     Client started HRT 2/15/2021     Intervention: Modality and Description:  Therapist utilized interpersonal, family systems, and narrative therapy techniques to explore clients, history of presenting problem, and current perceived stressors. Explored client updates around questioning gender, physical changes related to HRT and confusion whether they should continue HRT.       Jef-1:Me  Jef-2: Female, has different views, some overlap  Kd- Male, younger version, " "do not like, \"Kd is bad\"    Plan/Need for Future Services:    Assignment:  None given at this time.     DSM-5 Diagnoses:  Encounter Diagnoses   Name Primary?     Gender dysphoria in adolescent and adult Yes     Adjustment disorder with depressed mood          Return for therapy in 1-2 weeks to treat diagnosed problems.      Tenisha Stewart PsyD, LMFT  "

## 2021-08-17 ENCOUNTER — VIRTUAL VISIT (OUTPATIENT)
Dept: PSYCHOLOGY | Facility: CLINIC | Age: 26
End: 2021-08-17
Payer: COMMERCIAL

## 2021-08-17 DIAGNOSIS — F43.21 ADJUSTMENT DISORDER WITH DEPRESSED MOOD: ICD-10-CM

## 2021-08-17 DIAGNOSIS — F64.0 GENDER DYSPHORIA IN ADOLESCENT AND ADULT: Primary | ICD-10-CM

## 2021-08-17 PROCEDURE — 90837 PSYTX W PT 60 MINUTES: CPT | Mod: GT | Performed by: MARRIAGE & FAMILY THERAPIST

## 2021-08-17 NOTE — PROGRESS NOTES
"Video start time: 3:05 PM  Video end time: :  4:00 PM    Telemedicine Visit: The patient's condition can be safely assessed and treated via synchronous audio and visual telemedicine encounter.      Reason for Telemedicine Visit: Services only offered telehealth    Originating Site (Patient Location): Patient's home    Distant Site (Provider Location): [facility]: [facility]    Consent:  The patient/guardian has verbally consented to: the potential risks and benefits of telemedicine (video visit) versus in person care; bill my insurance or make self-payment for services provided; and responsibility for payment of non-covered services.     Mode of Communication:  Video Conference via Doxy    As the provider I attest to compliance with applicable laws and regulations related to telemedicine.      [facility] Sexual Health -  Case Progress Note    Date of Service: 21   Name: Kd Slater  : 1995  Medical Record Number: [id number]  Treating Provider:Tenisha Stewart PsyD, LMFT  Type of Session: Individual  Present in Session: Tenisha Fuchs  Number of Minutes:  55    Current Symptoms/Status:  Client reported ongoing concerns related to gender and to general mood. Client endorsed depressed mood, including lack of motivation, feelings of worthlessness, judgment, negative self talk.     Progress Toward Treatment Goals:   Client reported that they have begun to \"like\" themselves and lean into affirming gender exploration.     Client started HRT 2/15/2021     Intervention: Modality and Description:  Therapist utilized interpersonal, family systems, and narrative therapy techniques to explore clients, history of presenting problem, and current perceived stressors. Explored client updates around idea of \"Things that are worthwhile don't come cheap/easy.\" Explored client views around gender, family of origin related anxieties and self-concept.    Plan/Need for Future " Services:    Assignment:  None given at this time.     DSM-5 Diagnoses:  Encounter Diagnoses   Name Primary?     Gender dysphoria in adolescent and adult Yes     Adjustment disorder with depressed mood            Return for therapy in 1-2 weeks to treat diagnosed problems.      Tenisha Stewart PsyD, LMFT

## 2021-08-19 ENCOUNTER — MYC MEDICAL ADVICE (OUTPATIENT)
Dept: FAMILY MEDICINE | Facility: CLINIC | Age: 26
End: 2021-08-19

## 2021-09-05 ENCOUNTER — HEALTH MAINTENANCE LETTER (OUTPATIENT)
Age: 26
End: 2021-09-05

## 2021-09-21 ENCOUNTER — MYC MEDICAL ADVICE (OUTPATIENT)
Dept: FAMILY MEDICINE | Facility: CLINIC | Age: 26
End: 2021-09-21

## 2021-09-21 ENCOUNTER — VIRTUAL VISIT (OUTPATIENT)
Dept: PSYCHOLOGY | Facility: CLINIC | Age: 26
End: 2021-09-21
Payer: COMMERCIAL

## 2021-09-21 DIAGNOSIS — F64.0 GENDER DYSPHORIA IN ADOLESCENT AND ADULT: ICD-10-CM

## 2021-09-21 DIAGNOSIS — F43.21 ADJUSTMENT DISORDER WITH DEPRESSED MOOD: Primary | ICD-10-CM

## 2021-09-21 PROCEDURE — 90837 PSYTX W PT 60 MINUTES: CPT | Mod: GT | Performed by: MARRIAGE & FAMILY THERAPIST

## 2021-09-21 PROCEDURE — 99207 PR NO BILLABLE SERVICE THIS VISIT: CPT | Performed by: MARRIAGE & FAMILY THERAPIST

## 2021-09-21 NOTE — PROGRESS NOTES
"Video start time: 3:05 PM  Video end time: :  4:00 PM    Telemedicine Visit: The patient's condition can be safely assessed and treated via synchronous audio and visual telemedicine encounter.      Reason for Telemedicine Visit: Services only offered telehealth    Originating Site (Patient Location): Patient's home    Distant Site (Provider Location): [facility]: [facility]    Consent:  The patient/guardian has verbally consented to: the potential risks and benefits of telemedicine (video visit) versus in person care; bill my insurance or make self-payment for services provided; and responsibility for payment of non-covered services.     Mode of Communication:  Video Conference via Doxy    As the provider I attest to compliance with applicable laws and regulations related to telemedicine.      [facility] Sexual Health -  Case Progress Note    Date of Service: 21   Name: Kd Slater  : 1995  Medical Record Number: [id number]  Treating Provider:Tenisha Stewart PsyD, LMFT  Type of Session: Individual  Present in Session: Tenisha Fuchs  Number of Minutes:  55    Current Symptoms/Status:  Client reported ongoing concerns related to gender and to general mood. Client endorsed depressed mood, including lack of motivation, feelings of worthlessness, judgment, negative self talk.     Progress Toward Treatment Goals:   Client reported that they have begun to \"like\" themselves and lean into affirming gender exploration.     Client returned to [employer] to complete liberal arts degree.    Client started HRT 2/15/2021, client stopped taking hormones on their own at the beginning of September.    Intervention: Modality and Description:  Therapist utilized interpersonal, family systems, and narrative therapy techniques to explore clients, history of presenting problem, and current perceived stressors. Explored client updates around quitting hormones due to major emotional shifts and ED. Held " space to process client concerns, questioning, etc.. Explored reasons for quitting hormones, fears and thoughts about it.     Plan/Need for Future Services:    Assignment:  None given at this time.     DSM-5 Diagnoses:  Encounter Diagnoses   Name Primary?     Adjustment disorder with depressed mood Yes     Gender dysphoria in adolescent and adult            Return for therapy in 1-2 weeks to treat diagnosed problems.      Tenisha Stewart PsyD, LMFT

## 2021-09-30 ENCOUNTER — VIRTUAL VISIT (OUTPATIENT)
Dept: FAMILY MEDICINE | Facility: CLINIC | Age: 26
End: 2021-09-30
Payer: COMMERCIAL

## 2021-09-30 DIAGNOSIS — F64.0 GENDER DYSPHORIA IN ADOLESCENT AND ADULT: ICD-10-CM

## 2021-09-30 PROCEDURE — 99214 OFFICE O/P EST MOD 30 MIN: CPT | Mod: GT | Performed by: FAMILY MEDICINE

## 2021-09-30 RX ORDER — ESTRADIOL 2 MG/1
2 TABLET ORAL DAILY
Qty: 90 TABLET | Refills: 0 | Status: SHIPPED | OUTPATIENT
Start: 2021-09-30 | End: 2021-12-30

## 2021-09-30 RX ORDER — SPIRONOLACTONE 100 MG/1
100 TABLET, FILM COATED ORAL DAILY
Qty: 90 TABLET | Refills: 0 | Status: SHIPPED | OUTPATIENT
Start: 2021-09-30 | End: 2021-12-30

## 2021-09-30 ASSESSMENT — ENCOUNTER SYMPTOMS
FEVER: 0
SHORTNESS OF BREATH: 0
CHILLS: 0

## 2021-09-30 NOTE — PROGRESS NOTES
The patient has been notified of following:       Patient has given verbal consent for Video visit? Yes    Patient would like the video invitation sent by: Other e-mail: Digital Message Display    Video Start Time: 3:21 PM              SHELBIE Fuchs is a 26 year old individual that uses pronouns She/Her/Hers/Herself that presents today for follow up of:  feminizing hormone therapy.   Alone or accompanied by: accompanied today by  Gender identity: female  Started Hormone  therapy  2/2021  Continues on Estrace 6* mg daily  and Spironlactone 200* mg daily   Any special concerns today?   Stopped hormones for 1 month during August. Started by having had missed a day or two, then escalated during period of stress, life changes. Had not been seeing therapist for a bit. Recently saw therapist, able to get back on track.   Restarted 2 days ago, with just 1 tab estradiol, no spironolactone.  Want to continue hormone therapy, but not sure if binary approach.    ----noticeable changes more uncomfortable for patient, but not overwhelming, no other specific concerns    On hormones?  YES +++ Shot day of the week? Not applicable-taking pills/patch/gel      Due for labs?  Yes      +++ Refills of meds needed?  Yes  Gender related body changes since last visit:   Still some body shape changes, breast growth which has stablized    Breakthrough bleeding? Does Not Apply    New health concerns since last visit:  ---none    No past surgical history on file.    There is no problem list on file for this patient.      Current Outpatient Medications   Medication Sig Dispense Refill     estradiol (ESTRACE) 2 MG tablet TAKE 3 TABLETS (6 MG) BY MOUTH DAILY 270 tablet 2     IBUPROFEN PO Take by mouth continuous prn for moderate pain       spironolactone (ALDACTONE) 100 MG tablet Take 2 tablets (200 mg) by mouth daily 60 tablet 3       History   Smoking Status     Former Smoker     Packs/day: 0.05     Quit date: 2/12/2021   Smokeless Tobacco     Never Used        No  Known Allergies    Health Maintenance Due   Topic Date Due     MENTAL HEALTH TX PLAN  04/06/2021         Problem, Medication and Allergy Lists were reviewed and are current..         Review of Systems:   Review of Systems   Constitutional: Negative for chills and fever.   Respiratory: Negative for shortness of breath.    Cardiovascular: Negative for chest pain.              Labs:   Results from last visit:  Orders Only on 05/13/2021   Component Date Value Ref Range Status     Testosterone Total 05/13/2021 602  240 - 950 ng/dL Final    Comment: This test was developed and its performance characteristics determined by the   [facility].   It has not been cleared or approved by the FDA. The laboratory is regulated   under CLIA as qualified to perform high-complexity testing. This test is used   for clinical purposes. It should not be regarded as investigational or for   research.       Sex Hormone Binding Globulin 05/13/2021 34  11 - 80 nmol/L Final     Free Testosterone Calculated 05/13/2021 12.80  4.7 - 24.4 ng/dL Final     Sodium 05/13/2021 139  133 - 144 mmol/L Final     Potassium 05/13/2021 4.1  3.4 - 5.3 mmol/L Final     Chloride 05/13/2021 106  94 - 109 mmol/L Final     Carbon Dioxide 05/13/2021 27  20 - 32 mmol/L Final     Anion Gap 05/13/2021 6  3 - 14 mmol/L Final     Glucose 05/13/2021 88  70 - 99 mg/dL Final     Urea Nitrogen 05/13/2021 9  7 - 30 mg/dL Final     Creatinine 05/13/2021 0.59* 0.66 - 1.25 mg/dL Final     GFR Estimate 05/13/2021 >90  >60 mL/min/[1.73_m2] Final    Comment: Non  GFR Calc  Starting 12/18/2018, serum creatinine based estimated GFR (eGFR) will be   calculated using the Chronic Kidney Disease Epidemiology Collaboration   (CKD-EPI) equation.       GFR Estimate If Black 05/13/2021 >90  >60 mL/min/[1.73_m2] Final    Comment:  GFR Calc  Starting 12/18/2018, serum creatinine based estimated  GFR (eGFR) will be   calculated using the Chronic Kidney Disease Epidemiology Collaboration   (CKD-EPI) equation.       Calcium 05/13/2021 9.1  8.5 - 10.1 mg/dL Final         EXAM:  Constitutional: healthy, alert and no distress   Psychiatric: mentation appears normal and affect normal    Assessment and Plan   1. Gender dysphoria  Pt further exploring gender identity and role of hormone therapy/goals for hormones  Discussed options; pt elects dose for now of  estradiol 2 mg, spironolactone 100 mg daily with goal of mild/minimal  visible changes   Follow-up in 1-2 months, if feeling comfortable, will discuss iIncrease at that point   labs next visit  covid vaccinated.        Video-Visit Details    Type of service:  Video Visit    Video End Time (time video stopped): 333    Originating Location (pt. Location): Home    Distant Location (provider location):  [facility]     Mode of Communication:  Video Conference via video platform: Latrice Redmond MD        Results by [facility]  Questions were elicited and answered.     Patricio Redmond MD

## 2021-10-07 ENCOUNTER — VIRTUAL VISIT (OUTPATIENT)
Dept: PSYCHOLOGY | Facility: CLINIC | Age: 26
End: 2021-10-07
Payer: COMMERCIAL

## 2021-10-07 DIAGNOSIS — F64.0 GENDER DYSPHORIA IN ADOLESCENT AND ADULT: Primary | ICD-10-CM

## 2021-10-07 DIAGNOSIS — F43.21 ADJUSTMENT DISORDER WITH DEPRESSED MOOD: ICD-10-CM

## 2021-10-07 PROCEDURE — 99207 PR NO BILLABLE SERVICE THIS VISIT: CPT | Performed by: MARRIAGE & FAMILY THERAPIST

## 2021-10-07 PROCEDURE — 90837 PSYTX W PT 60 MINUTES: CPT | Mod: GT | Performed by: MARRIAGE & FAMILY THERAPIST

## 2021-10-07 NOTE — PROGRESS NOTES
"Video start time: 2:33 PM  Video end time: :  3:30PM    Telemedicine Visit: The patient's condition can be safely assessed and treated via synchronous audio and visual telemedicine encounter.      Reason for Telemedicine Visit: Services only offered telehealth    Originating Site (Patient Location): Patient's home    Distant Site (Provider Location): [facility]: [facility]    Consent:  The patient/guardian has verbally consented to: the potential risks and benefits of telemedicine (video visit) versus in person care; bill my insurance or make self-payment for services provided; and responsibility for payment of non-covered services.     Mode of Communication:  Video Conference via Doxy    As the provider I attest to compliance with applicable laws and regulations related to telemedicine.      [facility] Sexual Health -  Case Progress Note    Date of Service: 10/07/21   Name: Kd Slater  : 1995  Medical Record Number: [id number]  Treating Provider:Tenisha Stewart PsyD, LMFT  Type of Session: Individual  Present in Session: Tenisha Fuchs  Number of Minutes:  57    Current Symptoms/Status:  Client reported ongoing concerns related to gender and to general mood. Client endorsed depressed mood, including lack of motivation, feelings of worthlessness, judgment, negative self talk.     Progress Toward Treatment Goals:   Client reported that they have begun to \"like\" themselves and lean into affirming gender exploration.     Client returned to [employer] to complete liberal arts degree.    Client started HRT 2/15/2021, client stopped taking hormones on their own at the beginning of September. Client resumed HRT at lower dose.    Intervention: Modality and Description:  Therapist utilized interpersonal, family systems, and narrative therapy techniques to explore clients, history of presenting problem, and current perceived stressors. Explored client updates around returning with Dr. Redmond, " restarting hormones at lower dose. Processed dysphoria, decision to resume, and how perspective has shifted due to reduced dysphoria.     Plan/Need for Future Services:    Assignment:  None given at this time.     DSM-5 Diagnoses:  Encounter Diagnoses   Name Primary?     Adjustment disorder with depressed mood      Gender dysphoria in adolescent and adult Yes           Return for therapy in 1-2 weeks to treat diagnosed problems.      Tenisha Stewart PsyD, LMFT

## 2021-10-28 ENCOUNTER — VIRTUAL VISIT (OUTPATIENT)
Dept: PSYCHOLOGY | Facility: CLINIC | Age: 26
End: 2021-10-28
Payer: COMMERCIAL

## 2021-10-28 DIAGNOSIS — F64.0 GENDER DYSPHORIA IN ADOLESCENT AND ADULT: ICD-10-CM

## 2021-10-28 DIAGNOSIS — F43.21 ADJUSTMENT DISORDER WITH DEPRESSED MOOD: Primary | ICD-10-CM

## 2021-10-28 PROCEDURE — 90837 PSYTX W PT 60 MINUTES: CPT | Mod: GT | Performed by: MARRIAGE & FAMILY THERAPIST

## 2021-10-28 PROCEDURE — 99207 PR NO BILLABLE SERVICE THIS VISIT: CPT | Performed by: MARRIAGE & FAMILY THERAPIST

## 2021-10-28 NOTE — PROGRESS NOTES
"Video start time: 2:33 PM  Video end time: : 3:30 PM    Telemedicine Visit: The patient's condition can be safely assessed and treated via synchronous audio and visual telemedicine encounter.      Reason for Telemedicine Visit: Services only offered telehealth    Originating Site (Patient Location): Patient's home    Distant Site (Provider Location): [facility]: [facility]    Consent:  The patient/guardian has verbally consented to: the potential risks and benefits of telemedicine (video visit) versus in person care; bill my insurance or make self-payment for services provided; and responsibility for payment of non-covered services.     Mode of Communication:  Video Conference via Doxy    As the provider I attest to compliance with applicable laws and regulations related to telemedicine.      [facility] Sexual Health -  Case Progress Note    Date of Service: 10/28/21   Name: Kd Slater  : 1995  Medical Record Number: [id number]  Treating Provider:Tenisha Stewart PsyD, LMFT  Type of Session: Individual  Present in Session: Tenisha Fuchs  Number of Minutes:  57    Current Symptoms/Status:  Client reported ongoing concerns related to gender and to general mood. Client endorsed depressed mood, including lack of motivation, feelings of worthlessness, judgment, negative self talk.     Progress Toward Treatment Goals:   Client reported that they have begun to \"like\" themselves and lean into affirming gender exploration.     Client returned to [employer] to complete liberal arts degree.    Client started HRT 2/15/2021.     Intervention: Modality and Description:  Therapist utilized interpersonal, family systems, and narrative therapy techniques to explore clients, history of presenting problem, and current perceived stressors. Explored client updates around needing time off from work to address school work, increased anxiety and overwhelm.     Plan/Need for Future " Services:    Assignment:  None given at this time.     DSM-5 Diagnoses:  Encounter Diagnoses   Name Primary?     Adjustment disorder with depressed mood Yes     Gender dysphoria in adolescent and adult              Return for therapy in 1-2 weeks to treat diagnosed problems.      Tenisha Stewart PsyD, LMFT

## 2021-11-11 ENCOUNTER — VIRTUAL VISIT (OUTPATIENT)
Dept: PSYCHOLOGY | Facility: CLINIC | Age: 26
End: 2021-11-11
Payer: COMMERCIAL

## 2021-11-11 DIAGNOSIS — F43.21 ADJUSTMENT DISORDER WITH DEPRESSED MOOD: Primary | ICD-10-CM

## 2021-11-11 DIAGNOSIS — F64.0 GENDER DYSPHORIA IN ADOLESCENT AND ADULT: ICD-10-CM

## 2021-11-11 PROCEDURE — 99207 PR NO BILLABLE SERVICE THIS VISIT: CPT | Performed by: MARRIAGE & FAMILY THERAPIST

## 2021-11-11 PROCEDURE — 90837 PSYTX W PT 60 MINUTES: CPT | Mod: GT | Performed by: MARRIAGE & FAMILY THERAPIST

## 2021-12-02 ENCOUNTER — VIRTUAL VISIT (OUTPATIENT)
Dept: PSYCHOLOGY | Facility: CLINIC | Age: 26
End: 2021-12-02
Payer: COMMERCIAL

## 2021-12-02 DIAGNOSIS — F43.21 ADJUSTMENT DISORDER WITH DEPRESSED MOOD: Primary | ICD-10-CM

## 2021-12-02 PROCEDURE — 99207 PR NO BILLABLE SERVICE THIS VISIT: CPT | Performed by: MARRIAGE & FAMILY THERAPIST

## 2021-12-02 PROCEDURE — 90834 PSYTX W PT 45 MINUTES: CPT | Mod: GT | Performed by: MARRIAGE & FAMILY THERAPIST

## 2021-12-07 NOTE — PROGRESS NOTES
"Video start time: 2:33 PM  Video end time: : 3:30 PM    Telemedicine Visit: The patient's condition can be safely assessed and treated via synchronous audio and visual telemedicine encounter.      Reason for Telemedicine Visit: Services only offered telehealth    Originating Site (Patient Location): Patient's home    Distant Site (Provider Location): [facility]: [facility]    Consent:  The patient/guardian has verbally consented to: the potential risks and benefits of telemedicine (video visit) versus in person care; bill my insurance or make self-payment for services provided; and responsibility for payment of non-covered services.     Mode of Communication:  Video Conference via Doxy    As the provider I attest to compliance with applicable laws and regulations related to telemedicine.      [facility] Sexual Health -  Case Progress Note    Date of Service: 21   Name: Kd Slater  : 1995  Medical Record Number: [id number]  Treating Provider:Tenisha Stewart PsyD, LMFT  Type of Session: Individual  Present in Session: Tenisha Fuchs  Number of Minutes:  57    Current Symptoms/Status:  Client reported ongoing concerns related to gender and to general mood. Client endorsed depressed mood, including lack of motivation, feelings of worthlessness, judgment, negative self talk.     Progress Toward Treatment Goals:   Client reported that they have begun to \"like\" themselves and lean into affirming gender exploration.     Client returned to [employer] to complete liberal arts degree.    Client started HRT 2/15/2021.     Intervention: Modality and Description:  Therapist utilized interpersonal, family systems, and narrative therapy techniques to explore clients, history of presenting problem, and current perceived stressors. Explored client perceptions around image, gender expectations, finding space to explore anxiety around appearance.    Plan/Need for Future " Services:    Assignment:  None given at this time.     DSM-5 Diagnoses:  Encounter Diagnoses   Name Primary?     Adjustment disorder with depressed mood Yes     Gender dysphoria in adolescent and adult                Return for therapy in 1-2 weeks to treat diagnosed problems.      Tenisha Stewart PsyD, LMFT

## 2021-12-22 NOTE — PROGRESS NOTES
"Video start time: 2:30 PM  Video end time: : 3: 08PM    Telemedicine Visit: The patient's condition can be safely assessed and treated via synchronous audio and visual telemedicine encounter.      Reason for Telemedicine Visit: Services only offered telehealth    Originating Site (Patient Location): Patient's home    Distant Site (Provider Location): [facility]: [facility]    Consent:  The patient/guardian has verbally consented to: the potential risks and benefits of telemedicine (video visit) versus in person care; bill my insurance or make self-payment for services provided; and responsibility for payment of non-covered services.     Mode of Communication:  Video Conference via Doxy    As the provider I attest to compliance with applicable laws and regulations related to telemedicine.      [facility] Sexual Health -  Case Progress Note    Date of Service: 21   Name: Kd Slater  : 1995  Medical Record Number: [id number]  Treating Provider:Tenisha Stewart PsyD, LMFT  Type of Session: Individual  Present in Session: Tenisha Fuchs  Number of Minutes:  38    Current Symptoms/Status:  Client reported ongoing concerns related to gender and to general mood. Client endorsed depressed mood, including lack of motivation, feelings of worthlessness, judgment, negative self talk.     Progress Toward Treatment Goals:   Client reported that they have begun to \"like\" themselves and lean into affirming gender exploration.     Client returned to [employer] to complete liberal arts degree.    Client started HRT 2/15/2021.     Intervention: Modality and Description:  Therapist utilized interpersonal, family systems, and narrative therapy techniques to explore clients, history of presenting problem, and current perceived stressors. Explored client updates around school, gender dysphoria, and mental health. Client shared lack of distress around major issues at this time.    Plan/Need for Future " Services:    Assignment:  None given at this time.     DSM-5 Diagnoses:  Encounter Diagnosis   Name Primary?     Adjustment disorder with depressed mood Yes           Return for therapy in 1-2 weeks to treat diagnosed problems.      Tenisha Stewart PsyD, ALISSAFT

## 2021-12-26 ENCOUNTER — HEALTH MAINTENANCE LETTER (OUTPATIENT)
Age: 26
End: 2021-12-26

## 2021-12-30 ENCOUNTER — VIRTUAL VISIT (OUTPATIENT)
Dept: PSYCHOLOGY | Facility: CLINIC | Age: 26
End: 2021-12-30
Payer: COMMERCIAL

## 2021-12-30 DIAGNOSIS — F64.0 GENDER DYSPHORIA IN ADOLESCENT AND ADULT: ICD-10-CM

## 2021-12-30 DIAGNOSIS — F43.21 ADJUSTMENT DISORDER WITH DEPRESSED MOOD: Primary | ICD-10-CM

## 2021-12-30 PROCEDURE — 90832 PSYTX W PT 30 MINUTES: CPT | Mod: GT | Performed by: MARRIAGE & FAMILY THERAPIST

## 2021-12-30 PROCEDURE — 99207 PR NO BILLABLE SERVICE THIS VISIT: CPT | Performed by: MARRIAGE & FAMILY THERAPIST

## 2021-12-30 RX ORDER — SPIRONOLACTONE 100 MG/1
100 TABLET, FILM COATED ORAL DAILY
Qty: 90 TABLET | Refills: 0 | Status: SHIPPED | OUTPATIENT
Start: 2021-12-30

## 2021-12-30 RX ORDER — ESTRADIOL 2 MG/1
2 TABLET ORAL DAILY
Qty: 90 TABLET | Refills: 0 | Status: SHIPPED | OUTPATIENT
Start: 2021-12-30

## 2021-12-30 NOTE — TELEPHONE ENCOUNTER
Requested Medication: Spironolactone   Dose: 100mg  Quantity: 90  Refills: 0    Take 1 tablet daily  _____    Requested Medication: Estradiol   Dose: 2mg  Quantity: 90  Refills: 0    Take 1 tablet daily    Last seen at [facility]: 9/30/21 - follow up 1/2 mo  Next Appointment with Provider: 1/10/22    Ernestine Goodwin CMA

## 2021-12-30 NOTE — PROGRESS NOTES
"Video start time: 2:35 PM  Video end time: : 3:00PM    Telemedicine Visit: The patient's condition can be safely assessed and treated via synchronous audio and visual telemedicine encounter.      Reason for Telemedicine Visit: Services only offered telehealth    Originating Site (Patient Location): Patient's home    Distant Site (Provider Location): [facility]    Consent:  The patient/guardian has verbally consented to: the potential risks and benefits of telemedicine (video visit) versus in person care; bill my insurance or make self-payment for services provided; and responsibility for payment of non-covered services.     Mode of Communication:  Video Conference via Doxy    As the provider I attest to compliance with applicable laws and regulations related to telemedicine.      [facility] -  Case Progress Note    Date of Service: 21   Name: Kd Slater  : 1995  Medical Record Number: [id number]  Treating Provider:Tenisha Stewart PsyD, LMFT  Type of Session: Individual  Present in Session: Tenisha Fuchs  Number of Minutes:  55    Current Symptoms/Status:  Client reported ongoing concerns related to gender and to general mood. Client endorsed depressed mood, including lack of motivation, feelings of worthlessness, judgment, negative self talk.     Progress Toward Treatment Goals:   Client reported that they have begun to \"like\" themselves and lean into affirming gender exploration.     Client finished  liberal arts degree.    Client started HRT 2/15/2021.     Intervention: Modality and Description:  Therapist utilized interpersonal, family systems, and narrative therapy techniques to explore clients, history of presenting problem, and current perceived stressors. Explored client updates around triggering experience at [place], desire for more gender affirmation from family, and \"exposure\" therapy around discomfort with physical appearance, " expectations and issues of worthiness.     Plan/Need for Future Services:    Assignment:  None given at this time.     DSM-5 Diagnoses:  Encounter Diagnoses   Name Primary?     Adjustment disorder with depressed mood Yes     Gender dysphoria in adolescent and adult            Return for therapy in 1-2 weeks to treat diagnosed problems.      Tenisha Stewart PsyD, LMFT

## 2022-01-13 ENCOUNTER — VIRTUAL VISIT (OUTPATIENT)
Dept: PSYCHOLOGY | Facility: CLINIC | Age: 27
End: 2022-01-13
Payer: COMMERCIAL

## 2022-01-13 DIAGNOSIS — F64.0 GENDER DYSPHORIA IN ADOLESCENT AND ADULT: Primary | ICD-10-CM

## 2022-01-13 PROCEDURE — 90837 PSYTX W PT 60 MINUTES: CPT | Mod: GT | Performed by: MARRIAGE & FAMILY THERAPIST

## 2022-01-13 PROCEDURE — 99207 PR NO BILLABLE SERVICE THIS VISIT: CPT | Performed by: MARRIAGE & FAMILY THERAPIST

## 2022-01-13 NOTE — PROGRESS NOTES
"Video start time: 2:35 PM  Video end time: : 3:30PM    Telemedicine Visit: The patient's condition can be safely assessed and treated via synchronous audio and visual telemedicine encounter.      Reason for Telemedicine Visit: Services only offered telehealth    Originating Site (Patient Location): Patient's home    Distant Site (Provider Location): [facility]    Consent:  The patient/guardian has verbally consented to: the potential risks and benefits of telemedicine (video visit) versus in person care; bill my insurance or make self-payment for services provided; and responsibility for payment of non-covered services.     Mode of Communication:  Video Conference via Doxy    As the provider I attest to compliance with applicable laws and regulations related to telemedicine.      [facility] -  Case Progress Note    Date of Service: 22   Name: Kd Slater  : 1995  Medical Record Number: [id number]  Treating Provider:Tenisha Stewart PsyD, LMFT  Type of Session: Individual  Present in Session: Tenisha Fuchs  Number of Minutes:  55    Current Symptoms/Status:  Client reported ongoing concerns related to gender and to general mood. Client endorsed depressed mood, including lack of motivation, feelings of worthlessness, judgment, negative self talk.     Progress Toward Treatment Goals:   Client reported that they have begun to \"like\" themselves and lean into affirming gender exploration.     Client finished  liberal arts degree.    Client started HRT 2/15/2021 and increased dosage.    Intervention: Modality and Description:  Therapist utilized interpersonal, family systems, and narrative therapy techniques to explore clients, history of presenting problem, and current perceived stressors. Explored client updates around gender dysphoria, fear and insecurity. Processed all or nothing perfectionism around presenting/passing as female.  Plan/Need for Future " Services:    Assignment:  None given at this time.     DSM-5 Diagnoses:  Encounter Diagnosis   Name Primary?     Gender dysphoria in adolescent and adult Yes         Return for therapy in 1-2 weeks to treat diagnosed problems.      Tenisha Stewart PsyD, ALISSAFT

## 2022-01-27 ENCOUNTER — VIRTUAL VISIT (OUTPATIENT)
Dept: PSYCHOLOGY | Facility: CLINIC | Age: 27
End: 2022-01-27
Payer: COMMERCIAL

## 2022-01-27 DIAGNOSIS — F43.21 ADJUSTMENT DISORDER WITH DEPRESSED MOOD: Primary | ICD-10-CM

## 2022-01-27 DIAGNOSIS — F64.0 GENDER DYSPHORIA IN ADOLESCENT AND ADULT: ICD-10-CM

## 2022-01-27 PROCEDURE — 99207 PR NO BILLABLE SERVICE THIS VISIT: CPT | Performed by: MARRIAGE & FAMILY THERAPIST

## 2022-01-27 PROCEDURE — 90837 PSYTX W PT 60 MINUTES: CPT | Mod: GT | Performed by: MARRIAGE & FAMILY THERAPIST

## 2022-01-27 NOTE — PROGRESS NOTES
[facility] - Progress Note    Date of Service: 22   Name: Kd Slater  : 1995  Medical Record Number: [id number]  Treating Provider: Tenisha Stweart PsyD  Type of Session: Individual  Present in Session: Client  Session Start and Stop Time: 1:30-2:25  Number of Minutes: 55    SERVICE MODALITY:  Video Visit:      Provider verified identity through the following two step process.  Patient provided:  Patient is known previously to provider    Telemedicine Visit: The patient's condition can be safely assessed and treated via synchronous audio and visual telemedicine encounter.      Reason for Telemedicine Visit: Services only offered telehealth    Originating Site (Patient Location): Patient's home    Distant Site (Provider Location): [facility]    Consent:  The patient/guardian has verbally consented to: the potential risks and benefits of telemedicine (video visit) versus in person care; bill my insurance or make self-payment for services provided; and responsibility for payment of non-covered services.     Patient would like the video invitation sent by:  Send to e-mail at: [email or web address]    Mode of Communication:  Video Conference via [email or web address]    As the provider I attest to compliance with applicable laws and regulations related to telemedicine.    DSM-5 Diagnoses:  Encounter Diagnoses   Name Primary?     Adjustment disorder with depressed mood Yes     Gender dysphoria in adolescent and adult      Current Reported Symptoms and Status update:  Client presented with anxiety, rumination, self-loathing and shame, gender dysphoria.     Progress Toward Treatment Goals:   Satisfactory progress - ACTION (Actively working towards change); Intervened by reinforcing change plan / affirming steps taken    Therapeutic Interventions/Treatment Strategies:    Area(s) of treatment focus addressed in this session included Symptom Management and  Physical Health .        Psychotherapist offered support, feedback and validation, provided redirection and reinforced use of skills. Treatment modalities used include interpersonal, supportive psychotherapy. Interventions include Cognitive Restructuring:  Explored impact of ineffective thoughts / distortions on mood and activity and Assisted patient in formulating new neutral/positive alternatives to challenge less helpful / ineffective thoughts and Emotions Management:  Increased awareness of daily mood patterns/changes.  Support and Redirection    Patient Response:   Patient responded to session by accepting feedback, listening, accepting support and verbalizing understanding  Possible barriers to participation / learning include: and no barriers identified    Current Mental Status Exam:   Appearance:  Appropriate   Eye Contact:  Good   Attitude / Demeanor: Cooperative   Speech      Rate / Production: Normal/ Responsive      Volume:  Normal  volume  Orientation:  All  Mood:   Normal  Affect:   Appropriate   Thought Content: Clear   Insight:   Good       Plan/Need for Future Services:  Return for therapy in 2 weeks to treat diagnosed problems.    Patient has an initial individualized treatment plan that was created as part of their diagnostic assessment / admission process.  A master individualized treatment plan is in the process of being developed with the patient and multi-disciplinary care team.    Assignment:  None    Interactive Complexity:  There are four specific communication difficulties that complicate the work of the primary psychiatric procedure.  Interactive complexity (+[id number]) may be reported when at least one of these difficulties is present.    Communication difficulties present during current the psychiatric procedure include:  1. None.      Signature/Title:    Tenisha Stewart PsyD

## 2022-02-09 ENCOUNTER — VIRTUAL VISIT (OUTPATIENT)
Dept: PSYCHOLOGY | Facility: CLINIC | Age: 27
End: 2022-02-09
Payer: COMMERCIAL

## 2022-02-09 DIAGNOSIS — F43.21 ADJUSTMENT DISORDER WITH DEPRESSED MOOD: ICD-10-CM

## 2022-02-09 DIAGNOSIS — F64.0 GENDER DYSPHORIA IN ADOLESCENT AND ADULT: Primary | ICD-10-CM

## 2022-02-09 PROCEDURE — 99207 PR NO BILLABLE SERVICE THIS VISIT: CPT | Performed by: MARRIAGE & FAMILY THERAPIST

## 2022-02-09 PROCEDURE — 90837 PSYTX W PT 60 MINUTES: CPT | Mod: GT | Performed by: MARRIAGE & FAMILY THERAPIST

## 2022-02-24 ENCOUNTER — VIRTUAL VISIT (OUTPATIENT)
Dept: PSYCHOLOGY | Facility: CLINIC | Age: 27
End: 2022-02-24
Payer: COMMERCIAL

## 2022-02-24 DIAGNOSIS — F64.0 GENDER DYSPHORIA IN ADOLESCENT AND ADULT: Primary | ICD-10-CM

## 2022-02-24 PROCEDURE — 90837 PSYTX W PT 60 MINUTES: CPT | Mod: GT | Performed by: MARRIAGE & FAMILY THERAPIST

## 2022-02-24 PROCEDURE — 99207 PR NO BILLABLE SERVICE THIS VISIT: CPT | Performed by: MARRIAGE & FAMILY THERAPIST

## 2022-02-24 NOTE — PROGRESS NOTES
[facility] - Progress Note    Date of Service: 22   Name: Asaf Slater  : 1995  Medical Record Number: [id number]  Treating Provider: Tenisha Stewart PsyD  Type of Session: Individual  Present in Session: Client  Session Start and Stop Time: 2:34-3:30  Number of Minutes:  56    SERVICE MODALITY:  Video Visit:      Provider verified identity through the following two step process.  Patient provided:  Patient is known previously to provider    Telemedicine Visit: The patient's condition can be safely assessed and treated via synchronous audio and visual telemedicine encounter.      Reason for Telemedicine Visit: Services only offered telehealth    Originating Site (Patient Location): Patient's home    Distant Site (Provider Location): [facility]    Consent:  The patient/guardian has verbally consented to: the potential risks and benefits of telemedicine (video visit) versus in person care; bill my insurance or make self-payment for services provided; and responsibility for payment of non-covered services.     Patient would like the video invitation sent by:  Send to e-mail at: [email or web address]    Mode of Communication:  Video Conference via [email or web address]    As the provider I attest to compliance with applicable laws and regulations related to telemedicine.    DSM-5 Diagnoses:  Encounter Diagnosis   Name Primary?     Gender dysphoria in adolescent and adult Yes     Current Reported Symptoms and Status update:  Client expressed intermittent gender dysphoria.    Progress Toward Treatment Goals:   Stable - ACTION (Actively working towards change); Intervened by reinforcing change plan / affirming steps taken    Therapeutic Interventions/Treatment Strategies:    Area(s) of treatment focus addressed in this session included Symptom Management.        Psychotherapist offered support, feedback and validation and reinforced use of skills.  Treatment modalities used include interpersonal, experiential therapy. Interventions include Cognitive Restructuring:  Explored impact of ineffective thoughts / distortions on mood and activity and Facilitated recognition of the connection between negative thoughts and negative core beliefs, Emotions Management:  Increased awareness of daily mood patterns/changes and Other: Explored with patient how life transitions may impact mental health and functioning .  Support, Redirection and Feedback    Patient Response:   Patient responded to session by accepting feedback, listening, being attentive, accepting support and appearing alert  Possible barriers to participation / learning include: and no barriers identified    Current Mental Status Exam:   Appearance:  Appropriate   Eye Contact:  Good   Attitude / Demeanor: Cooperative   Speech      Rate / Production: Normal/ Responsive      Volume:  Normal  volume  Orientation:  All  Mood:   Normal  Affect:   Appropriate   Thought Content: Clear   Insight:   Good       Plan/Need for Future Services:  Return for therapy in 2 weeks to treat diagnosed problems.    Patient has an initial individualized treatment plan that was created as part of their diagnostic assessment / admission process.  A master individualized treatment plan is in the process of being developed with the patient and multi-disciplinary care team.    Assignment:  None    Interactive Complexity:  There are four specific communication difficulties that complicate the work of the primary psychiatric procedure.  Interactive complexity (+23101) may be reported when at least one of these difficulties is present.    Communication difficulties present during current the psychiatric procedure include:  1. None.      Signature/Title:    Tenisha Stewart PsyD

## 2022-03-01 NOTE — PROGRESS NOTES
[facility] - Progress Note    Date of Service: 22   Name: Kd Slater  : 1995  Medical Record Number: [id number]  Treating Provider: Tenisha Stewart PsyD  Type of Session: Individual  Present in Session: Client  Session Start and Stop Time: 3:00-3:55  Number of Minutes: 55    SERVICE MODALITY:  Video Visit:      Provider verified identity through the following two step process.  Patient provided:  Patient is known previously to provider    Telemedicine Visit: The patient's condition can be safely assessed and treated via synchronous audio and visual telemedicine encounter.      Reason for Telemedicine Visit: Services only offered telehealth    Originating Site (Patient Location): Patient's home    Distant Site (Provider Location): Provider Remote Setting- Home Office    Consent:  The patient/guardian has verbally consented to: the potential risks and benefits of telemedicine (video visit) versus in person care; bill my insurance or make self-payment for services provided; and responsibility for payment of non-covered services.     Patient would like the video invitation sent by:  Send to e-mail at: [email or web address]    Mode of Communication:  Video Conference via [email or web address]    As the provider I attest to compliance with applicable laws and regulations related to telemedicine.    DSM-5 Diagnoses:  Encounter Diagnoses   Name Primary?     Adjustment disorder with depressed mood      Gender dysphoria in adolescent and adult Yes     Current Reported Symptoms and Status update:  Client continues to have gender dysphoria, experiences existential anxiety, depressed mood, questioning around identity, guilt and shame.    Progress Toward Treatment Goals:   Satisfactory progress - ACTION (Actively working towards change); Intervened by reinforcing change plan / affirming steps taken    Therapeutic Interventions/Treatment Strategies:    Area(s) of treatment focus addressed in  this session included Symptom Management.        Psychotherapist offered support, feedback and validation, provided redirection and reinforced use of skills. Treatment modalities used include interpersonal therapy. Interventions include Behavioral Activation: Reinforced benefits/challenges of change process through applying skills to replace unwanted behaviors, Cognitive Restructuring:  Explored impact of ineffective thoughts / distortions on mood and activity, Symptoms Management: Promoted understanding of their diagnoses and how it impacts their functioning and Emotions Management:  Increased awareness of daily mood patterns/changes.  Support, Redirection and Feedback    Patient Response:   Patient responded to session by accepting feedback, listening, focusing on goals, being attentive, accepting support, appearing alert and verbalizing understanding  Possible barriers to participation / learning include: and no barriers identified    Current Mental Status Exam:   Appearance:  Appropriate   Eye Contact:  Good   Attitude / Demeanor: Cooperative   Speech      Rate / Production: Normal/ Responsive      Volume:  Normal  volume  Orientation:  All  Mood:   Normal  Affect:   Appropriate   Thought Content: Clear   Insight:   Good       Plan/Need for Future Services:  Return for therapy in 2 weeks to treat diagnosed problems.    Patient has an initial individualized treatment plan that was created as part of their diagnostic assessment / admission process.  A master individualized treatment plan is in the process of being developed with the patient and multi-disciplinary care team.    Assignment:  None    Interactive Complexity:  There are four specific communication difficulties that complicate the work of the primary psychiatric procedure.  Interactive complexity (+[id number]) may be reported when at least one of these difficulties is present.    Communication difficulties present during current the psychiatric procedure  include:  1. None.      Signature/Title:    Tenisha Stewart PsyD

## 2022-03-09 ENCOUNTER — VIRTUAL VISIT (OUTPATIENT)
Dept: PSYCHOLOGY | Facility: CLINIC | Age: 27
End: 2022-03-09
Payer: COMMERCIAL

## 2022-03-09 DIAGNOSIS — F43.21 ADJUSTMENT DISORDER WITH DEPRESSED MOOD: ICD-10-CM

## 2022-03-09 DIAGNOSIS — F64.0 GENDER DYSPHORIA IN ADOLESCENT AND ADULT: Primary | ICD-10-CM

## 2022-03-09 PROCEDURE — 99207 PR NO BILLABLE SERVICE THIS VISIT: CPT | Performed by: MARRIAGE & FAMILY THERAPIST

## 2022-03-09 PROCEDURE — 90837 PSYTX W PT 60 MINUTES: CPT | Mod: 95 | Performed by: MARRIAGE & FAMILY THERAPIST

## 2022-03-09 NOTE — PROGRESS NOTES
[facility] - Progress Note    Date of Service: 3/09/22   Name: Kd Slater  : 1995  Medical Record Number: [id number]  Treating Provider: Tenisha Stewart PsyD  Type of Session: Individual  Present in Session: Client  Session Start and Stop Time: 2:00-2:55  Number of Minutes:     SERVICE MODALITY:  Video Visit:      Provider verified identity through the following two step process.  Patient provided:  Patient is known previously to provider    Telemedicine Visit: The patient's condition can be safely assessed and treated via synchronous audio and visual telemedicine encounter.      Reason for Telemedicine Visit: Services only offered telehealth    Originating Site (Patient Location): Patient's home    Distant Site (Provider Location): [facility]    Consent:  The patient/guardian has verbally consented to: the potential risks and benefits of telemedicine (video visit) versus in person care; bill my insurance or make self-payment for services provided; and responsibility for payment of non-covered services.     Patient would like the video invitation sent by:  Send to e-mail at: [email or web address]    Mode of Communication:  Video Conference via [email or web address]    As the provider I attest to compliance with applicable laws and regulations related to telemedicine.    DSM-5 Diagnoses:  Encounter Diagnoses   Name Primary?     Gender dysphoria in adolescent and adult Yes     Adjustment disorder with depressed mood        Current Reported Symptoms and Status update:  Client endorsed ongoing questioning around experience of gender identity, sexual orientation, social pressures. Client reports ongoing anxiety, guilt/shame, doubt, self esteem and confidence concerns.    Progress Toward Treatment Goals:   Satisfactory progress - ACTION (Actively working towards change); Intervened by reinforcing change plan / affirming steps taken    Therapeutic  Interventions/Treatment Strategies:    Area(s) of treatment focus addressed in this session included Symptom Management, Develop Socialization / Interpersonal Relationship Skills and Physical Health .    Focused on processing origins of questioning gender, feeling lack of safety and acceptance/judgement in [place] and past experiences.     Psychotherapist offered support, feedback and validation, provided redirection and reinforced use of skills. Treatment modalities used include Cognitive Behavioral Therapy and interpersonal therapy. Interventions include Emotions Management:  Increased awareness of daily mood patterns/changes, Other: Assisted patient  in finding ways to adapt functioning in light of past traumatic experiences and Relationship Skills: Discussed strategies to promote healthier understanding of interpersonal relationships.  Support, Redirection and Feedback    Patient Response:   Patient responded to session by accepting feedback, listening, being attentive, accepting support, appearing alert and verbalizing understanding  Possible barriers to participation / learning include: and no barriers identified    Current Mental Status Exam:   Appearance:  Appropriate   Eye Contact:  Good   Attitude / Demeanor: Interested  Speech      Rate / Production: Normal/ Responsive      Volume:  Normal  volume  Orientation:  All  Mood:   Normal  Affect:   Appropriate   Thought Content: Clear   Insight:   Good       Plan/Need for Future Services:  Return for therapy in 2 weeks to treat diagnosed problems.    Patient has an initial individualized treatment plan that was created as part of their diagnostic assessment / admission process.  A master individualized treatment plan is in the process of being developed with the patient and multi-disciplinary care team.    Assignment:  none    Interactive Complexity:  There are four specific communication difficulties that complicate the work of the primary psychiatric procedure.   Interactive complexity (+[id number]) may be reported when at least one of these difficulties is present.    Communication difficulties present during current the psychiatric procedure include:  1. None.      Signature/Title:    Tenisha Stewart PsyD

## 2022-04-07 ENCOUNTER — VIRTUAL VISIT (OUTPATIENT)
Dept: PSYCHOLOGY | Facility: CLINIC | Age: 27
End: 2022-04-07
Payer: COMMERCIAL

## 2022-04-07 DIAGNOSIS — F43.21 ADJUSTMENT DISORDER WITH DEPRESSED MOOD: ICD-10-CM

## 2022-04-07 DIAGNOSIS — F64.0 GENDER DYSPHORIA IN ADOLESCENT AND ADULT: Primary | ICD-10-CM

## 2022-04-07 PROCEDURE — 90837 PSYTX W PT 60 MINUTES: CPT | Mod: GT | Performed by: MARRIAGE & FAMILY THERAPIST

## 2022-04-07 NOTE — PROGRESS NOTES
[facility] - Progress Note    Date of Service: 22   Name: Kd Slater  : 1995  Medical Record Number: [id number]  Treating Provider: Tenisha Stewart PsyD  Type of Session: Individual  Present in Session: Client  Session Start and Stop Time: 9:05-10:00  Number of Minutes: 55    SERVICE MODALITY:  Video Visit:      Provider verified identity through the following two step process.  Patient provided:  Patient  and Patient address    Telemedicine Visit: The patient's condition can be safely assessed and treated via synchronous audio and visual telemedicine encounter.      Reason for Telemedicine Visit: Services only offered telehealth    Originating Site (Patient Location): Patient's home    Distant Site (Provider Location): Provider Remote Setting- Home Office    Consent:  The patient/guardian has verbally consented to: the potential risks and benefits of telemedicine (video visit) versus in person care; bill my insurance or make self-payment for services provided; and responsibility for payment of non-covered services.     Patient would like the video invitation sent by:  My Chart    Mode of Communication:  Video Conference via well    As the provider I attest to compliance with applicable laws and regulations related to telemedicine.    DSM-5 Diagnoses:    Encounter Diagnoses   Name Primary?     Gender dysphoria in adolescent and adult Yes     Adjustment disorder with depressed mood      Current Reported Symptoms and Status update:  Client endorsed ups and downs related to social attention around physical changes hormones, depression/anxiety and gender dysphoria.    Progress Toward Treatment Goals:   Minimal progress - PREPARATION (Decided to change - considering how); Intervened by negotiating a change plan and determining options / strategies for behavior change, identifying triggers, exploring social supports, and working towards setting a date to begin behavior  change    Therapeutic Interventions/Treatment Strategies:    Area(s) of treatment focus addressed in this session included Symptom Management, Physical Health  and Other: Exploration of gender dysphoria.    Psychotherapist offered support, feedback and validation and reinforced use of skills. Treatment modalities used include interpersonal therapy. Interventions include Coping Skills: Addressed barriers to utilizing coping skills when in distress, Symptoms Management: Promoted understanding of their diagnoses and how it impacts their functioning and Emotions Management:  Increased awareness of daily mood patterns/changes.  Support, Redirection, Feedback and Problem Solving    Patient Response:   Patient responded to session by accepting feedback, giving feedback, listening, focusing on goals, being attentive and accepting support  Possible barriers to participation / learning include: and no barriers identified    Current Mental Status Exam:   Appearance:  Appropriate   Eye Contact:  Good   Attitude / Demeanor: Cooperative   Speech      Rate / Production: Normal/ Responsive      Volume:  Normal  volume  Orientation:  All  Mood:   Normal  Affect:   Appropriate   Thought Content: Clear   Insight:   Good       Plan/Need for Future Services:  Return for therapy in 2 weeks to treat diagnosed problems.    Patient has an initial individualized treatment plan that was created as part of their diagnostic assessment / admission process.  A master individualized treatment plan is in the process of being developed with the patient and multi-disciplinary care team.    Assignment:  None    Interactive Complexity:  There are four specific communication difficulties that complicate the work of the primary psychiatric procedure.  Interactive complexity (+65053) may be reported when at least one of these difficulties is present.    Communication difficulties present during current the psychiatric procedure  include:  1. None.      Signature/Title:    Tenisha Stewart PsyD

## 2022-04-21 ENCOUNTER — VIRTUAL VISIT (OUTPATIENT)
Dept: PSYCHOLOGY | Facility: CLINIC | Age: 27
End: 2022-04-21
Payer: COMMERCIAL

## 2022-04-21 DIAGNOSIS — F64.0 GENDER DYSPHORIA IN ADOLESCENT AND ADULT: Primary | ICD-10-CM

## 2022-04-21 DIAGNOSIS — F43.21 ADJUSTMENT DISORDER WITH DEPRESSED MOOD: ICD-10-CM

## 2022-04-21 PROCEDURE — 99207 PR NO BILLABLE SERVICE THIS VISIT: CPT | Performed by: MARRIAGE & FAMILY THERAPIST

## 2022-04-21 PROCEDURE — 90837 PSYTX W PT 60 MINUTES: CPT | Mod: GT | Performed by: MARRIAGE & FAMILY THERAPIST

## 2022-04-21 NOTE — PROGRESS NOTES
"[place] [facility] - Progress Note    Date of Service: 22   Name: Jef Slater  : 1995  Medical Record Number: [id number]  Treating Provider: Tenisha Stewart PsyD  Type of Session: Individual  Present in Session: Client  Session Start and Stop Time: 2:32-2:25  Number of Minutes: 53    SERVICE MODALITY:  Video Visit:      Provider verified identity through the following two step process.  Patient provided:  Patient  and Patient address    Telemedicine Visit: The patient's condition can be safely assessed and treated via synchronous audio and visual telemedicine encounter.      Reason for Telemedicine Visit: Patient has requested telehealth visit    Originating Site (Patient Location): Patient's home    Distant Site (Provider Location): [facility]    Consent:  The patient/guardian has verbally consented to: the potential risks and benefits of telemedicine (video visit) versus in person care; bill my insurance or make self-payment for services provided; and responsibility for payment of non-covered services.     Patient would like the video invitation sent by:  Send to e-mail at: [email or web address]    Mode of Communication:  Video Conference via [email or web address]    As the provider I attest to compliance with applicable laws and regulations related to telemedicine.    DSM-5 Diagnoses:    Encounter Diagnoses   Name Primary?     Gender dysphoria in adolescent and adult Yes     Adjustment disorder with depressed mood      Current Reported Symptoms and Status update:  Client continues to endorse shame, internalized transphobia, preoccupation with social perceptions, anxiety, gender dysphoria.     Currently having negative feelings about not passing, feeling \"visibly trans\" and processing discomfort with this.     Progress Toward Treatment Goals:   Satisfactory progress - ACTION (Actively working towards change); Intervened by reinforcing change plan / " affirming steps taken    Therapeutic Interventions/Treatment Strategies:    Area(s) of treatment focus addressed in this session included Symptom Management and Other: Exploration of dysphoria.    Psychotherapist offered support, feedback and validation and reinforced use of skills. Treatment modalities used include interpersonal, supportive psychotherapy. Interventions include Behavioral Activation: Explored how behaviors effect mood and interact with thoughts and feelings, Cognitive Restructuring:  Explored impact of ineffective thoughts / distortions on mood and activity and Symptoms Management: Promoted understanding of their diagnoses and how it impacts their functioning.  Support, Redirection and Feedback    Patient Response:   Patient responded to session by accepting feedback, giving feedback, listening, focusing on goals and verbalizing understanding  Possible barriers to participation / learning include: and no barriers identified    Current Mental Status Exam:   Appearance:  Appropriate  Eye Contact:  Good   Attitude / Demeanor: Cooperative   Speech      Rate / Production: Normal/ Responsive      Volume:  Normal  volume  Orientation:  All  Mood:   Normal  Affect:   Appropriate   Thought Content: Clear   Insight:   Good       Plan/Need for Future Services:  Return for therapy in 2 weeks to treat diagnosed problems with [facility] [place].   Patient has an initial individualized treatment plan that was created as part of their diagnostic assessment / admission process.  A master individualized treatment plan is in the process of being developed with the patient and multi-disciplinary care team.    Assignment:  None    Interactive Complexity:  There are four specific communication difficulties that complicate the work of the primary psychiatric procedure.  Interactive complexity (+42546) may be reported when at least one of these difficulties is present.    Communication difficulties present during current the  psychiatric procedure include:  1. None.      Signature/Title:    Tenisha Stewart PsyD

## 2022-05-05 ENCOUNTER — VIRTUAL VISIT (OUTPATIENT)
Dept: PSYCHOLOGY | Facility: CLINIC | Age: 27
End: 2022-05-05
Payer: COMMERCIAL

## 2022-05-05 DIAGNOSIS — F64.0 GENDER DYSPHORIA IN ADOLESCENT AND ADULT: Primary | ICD-10-CM

## 2022-05-05 PROCEDURE — 90837 PSYTX W PT 60 MINUTES: CPT | Mod: GT

## 2022-05-05 PROCEDURE — 99207 PR NO CHARGE LOS: CPT

## 2022-05-05 NOTE — PROGRESS NOTES
[facility] Progress [facility]    Date of Service: 22   Name: Jef Slater  : 1995  Medical Record Number: [id number]  Treating Provider: NATALIE Peace LGSW  Type of Session: Individual  Present in Session: Jef  Session Start and Stop Time: 1500 - 1555  Number of Minutes: 55    SERVICE MODALITY:  Video Visit:      Provider verified identity through the following two step process.  Patient provided:  Patient was verified at admission/transfer    Telemedicine Visit: The patient's condition can be safely assessed and treated via synchronous audio and visual telemedicine encounter.      Reason for Telemedicine Visit: Services only offered telehealth    Originating Site (Patient Location): Patient's home    Distant Site (Provider Location): Provider Remote Setting- Home Office    Consent:  The patient/guardian has verbally consented to: the potential risks and benefits of telemedicine (video visit) versus in person care; bill my insurance or make self-payment for services provided; and responsibility for payment of non-covered services.     Patient would like the video invitation sent by:  My Chart    Mode of Communication:  Video Conference via [facility]    As the provider I attest to compliance with applicable laws and regulations related to telemedicine.    DSM-5 Diagnoses:  Gender Dysphoria in Adolescent and Adult  Adjustment Disorder with Depressed Mood    Current Reported Symptoms and Status update:  Gender Dysphoria:   Incongruence between birth assigned sex and experienced/expressed gender  for longer than six months, as manifested by: 1) incongruence between experienced/expressed gender and primary/secondary sex characteristics, 2) strong desire to be rid of primary/secondary sex characteristics; 3) strong desire to be gender other than birth-assigned sex.     Depression:   Low mood  Low energy  Fatigue    Progress Toward Treatment Goals:   Stable - MAINTENANCE (Working to  maintain change, with risk of relapse); Intervened by continuing to positively reinforce healthy behavior choice     Therapeutic Interventions/Treatment Strategies:    Area(s) of treatment focus addressed in this session included Symptom Management.    Relational and interpersonal approach to exploring thoughts, feelings, and emotions surrounding gender and interpersonal relationships. Client shared helpful therapeutic dynamics, hx of anxious depressive symptoms, and interpersonal relationships. Writer and client discussed interpersonal relationships and sexuality. Writer validated, reflected, and normalized client's thoughts, feelings, and emotions throughout session. Client was open, responsive, and engaged throughout session.     Psychotherapist offered support, feedback and validation and reinforced use of skills. Treatment modalities used include Cognitive Behavioral Therapy. Interventions include Symptoms Management: Promoted understanding of their diagnoses and how it impacts their functioning.  Support, Redirection, Feedback, Limit/Boundaries, Clarification and Cognitive Behavioral Therapy    Patient Response:   Patient responded to session by accepting feedback, giving feedback, listening, focusing on goals, being attentive, accepting support, appearing alert and verbalizing understanding  Possible barriers to participation / learning include: and no barriers identified    Current Mental Status Exam:   Appearance:  Appropriate   Eye Contact:  Good   Attitude / Demeanor: Cooperative  Pleasant Attentive  Speech      Rate / Production: Normal/ Responsive      Volume:  Normal  volume  Orientation:  Person Place Time Situation  Mood:   Normal  Affect:   Appropriate   Thought Content: Clear   Insight:   Good       Plan/Need for Future Services:  Return for therapy in 2 weeks to treat diagnosed problems.    Patient has a current master individualized treatment plan.  See Epic treatment plan for more  information.    Assignment:  None    Interactive Complexity:  There are four specific communication difficulties that complicate the work of the primary psychiatric procedure.  Interactive complexity (+55591) may be reported when at least one of these difficulties is present.    Communication difficulties present during current the psychiatric procedure include:  1. None.      Signature/Title:        NATALIE Peace, LGSW

## 2022-05-13 NOTE — PROGRESS NOTES
I did not personally see the patient but I have reviewed and agree with the assessment and plan as documented in this note.  Candi Mera, PhD -- Supervisor   Licensed Psychologist

## 2022-08-11 ENCOUNTER — VIRTUAL VISIT (OUTPATIENT)
Dept: PSYCHOLOGY | Facility: CLINIC | Age: 27
End: 2022-08-11
Payer: COMMERCIAL

## 2022-08-11 DIAGNOSIS — F64.0 GENDER DYSPHORIA IN ADOLESCENT AND ADULT: Primary | ICD-10-CM

## 2022-08-11 DIAGNOSIS — F43.21 ADJUSTMENT DISORDER WITH DEPRESSED MOOD: ICD-10-CM

## 2022-08-11 PROCEDURE — 90837 PSYTX W PT 60 MINUTES: CPT | Mod: GT | Performed by: MARRIAGE & FAMILY THERAPIST

## 2022-08-15 NOTE — PROGRESS NOTES
[place] [facility] - Progress Note    Date of Service: 22   Name: Jef Slater (Calvin)  : 1995  Medical Record Number: [id number]  Treating Provider: Tenisha Stewart PsyD  Type of Session: Individual  Present in Session: Client   Session Start and Stop Time: 2:03-3:00  Number of Minutes:57    SERVICE MODALITY:  Video Visit:      Provider verified identity through the following two step process.  Patient provided:  Patient is known previously to provider    Telemedicine Visit: The patient's condition can be safely assessed and treated via synchronous audio and visual telemedicine encounter.      Reason for Telemedicine Visit: Services only offered telehealth    Originating Site (Patient Location): Patient's home    Distant Site (Provider Location): Provider Remote Setting- Home Office    Consent:  The patient/guardian has verbally consented to: the potential risks and benefits of telemedicine (video visit) versus in person care; bill my insurance or make self-payment for services provided; and responsibility for payment of non-covered services.     Patient would like the video invitation sent by:  Send to e-mail at: [email or web address]    Mode of Communication:  Video Conference via [email or web address]    As the provider I attest to compliance with applicable laws and regulations related to telemedicine.    DSM-5 Diagnoses:  Encounter Diagnoses   Name Primary?     Gender dysphoria in adolescent and adult Yes     Adjustment disorder with depressed mood      Current Reported Symptoms and Status update:  Client endorses ups and downs related to gender dysphoria, uncertainty with social situations, self-doubt and questions.    Progress Toward Treatment Goals:   Stable - ACTION (Actively working towards change); Intervened by reinforcing change plan / affirming steps taken    Therapeutic Interventions/Treatment Strategies:    Area(s) of treatment focus addressed in this session included  Symptom Management and Develop Socialization / Interpersonal Relationship Skills.    Psychotherapist offered support, feedback and validation and reinforced use of skills Treatment modalities used include Gender Affirming Care Interventions include Symptoms Management: Promoted understanding of their diagnoses and how it impacts their functioning and Emotions Management:  Increased awareness of daily mood patterns/changes  Support and Feedback    Patient Response:   Patient responded to session by accepting feedback, listening and being attentive  Possible barriers to participation / learning include: and no barriers identified    Current Mental Status Exam:   Appearance:  Appropriate   Eye Contact:  Good   Attitude / Demeanor: Cooperative   Speech      Rate / Production: Normal/ Responsive      Volume:  Normal  volume  Orientation:  All  Mood:   Normal  Affect:   Appropriate   Thought Content: Clear   Insight:   Good       Plan/Need for Future Services:  Return for therapy in 2-3 weeks to treat diagnosed problems.    Patient has a current master individualized treatment plan.  See Epic treatment plan for more information.    Assignment:  none    Interactive Complexity:  There are four specific communication difficulties that complicate the work of the primary psychiatric procedure.  Interactive complexity (+48357) may be reported when at least one of these difficulties is present.    Communication difficulties present during current the psychiatric procedure include:  1. None.      Signature/Title:      Tenisha Stewart PsyD

## 2022-09-22 ENCOUNTER — VIRTUAL VISIT (OUTPATIENT)
Dept: PSYCHOLOGY | Facility: CLINIC | Age: 27
End: 2022-09-22
Payer: COMMERCIAL

## 2022-09-22 DIAGNOSIS — F43.21 ADJUSTMENT DISORDER WITH DEPRESSED MOOD: Primary | ICD-10-CM

## 2022-09-22 PROCEDURE — 90837 PSYTX W PT 60 MINUTES: CPT | Mod: GT | Performed by: MARRIAGE & FAMILY THERAPIST

## 2022-09-22 NOTE — PROGRESS NOTES
[facility] [facility] - Progress Note    Date of Service: 22   Name: Kd Slater  : 1995  Medical Record Number: [id number]  Treating Provider: Tenisha Stewart PsyD  Type of Session: Individual  Present in Session: Client  Session Start and Stop Time: 2:05-3:00  Number of Minutes:55    SERVICE MODALITY:  Video Visit:      Provider verified identity through the following two step process.  Patient provided:  Patient is known previously to provider    Telemedicine Visit: The patient's condition can be safely assessed and treated via synchronous audio and visual telemedicine encounter.      Reason for Telemedicine Visit: Services only offered telehealth    Originating Site (Patient Location): Patient's home    Distant Site (Provider Location): Provider Remote Setting- Home Office    Consent:  The patient/guardian has verbally consented to: the potential risks and benefits of telemedicine (video visit) versus in person care; bill my insurance or make self-payment for services provided; and responsibility for payment of non-covered services.     Patient would like the video invitation sent by:  Send to e-mail at: [email or web address]    Mode of Communication:  Video Conference via [email or web address]    As the provider I attest to compliance with applicable laws and regulations related to telemedicine.    DSM-5 Diagnoses:  Encounter Diagnosis   Name Primary?     Adjustment disorder with depressed mood Yes     Current Reported Symptoms and Status update:  Client reports anxiety, mixed mood with highs and lows, major life transitions and ongoing gender dysphoria related to birth assigned sex.    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     Progress Toward Treatment Goals:   Satisfactory progress - ACTION (Actively working towards change); Intervened by reinforcing change plan / affirming steps taken    Therapeutic Interventions/Treatment Strategies:    Area(s) of treatment focus  addressed in this session included Symptom Management and Develop Socialization / Interpersonal Relationship Skills.    Psychotherapist offered support, feedback and validation and reinforced use of skills Treatment modalities used include Gender Affirming Care Attachment based intervention  Interpersonal Interventions include Symptoms Management: Promoted understanding of their diagnoses and how it impacts their functioning, Emotions Management:  Increased awareness of daily mood patterns/changes and Relationship Skills: Discussed strategies to promote healthier understanding of interpersonal relationships  Support, Redirection and Feedback    Patient Response:   Patient responded to session by accepting feedback, listening, focusing on goals and accepting support  Possible barriers to participation / learning include: and no barriers identified    Current Mental Status Exam:   Appearance:  Appropriate   Eye Contact:  Good   Attitude / Demeanor: Cooperative   Speech      Rate / Production: Normal/ Responsive      Volume:  Normal  volume  Orientation:  All  Mood:   Normal  Affect:   Appropriate   Thought Content: Clear   Insight:   Good       Plan/Need for Future Services:  Return for therapy in 1 month to treat diagnosed problems.    Patient has a current master individualized treatment plan.  See Epic treatment plan for more information.    Assignment:  None    Interactive Complexity:  There are four specific communication difficulties that complicate the work of the primary psychiatric procedure.  Interactive complexity (+44984) may be reported when at least one of these difficulties is present.    Communication difficulties present during current the psychiatric procedure include:  1. None.      Signature/Title:      Tenisha Stewart PsyD

## 2022-10-23 ENCOUNTER — HEALTH MAINTENANCE LETTER (OUTPATIENT)
Age: 27
End: 2022-10-23

## 2022-10-25 ENCOUNTER — VIRTUAL VISIT (OUTPATIENT)
Dept: PSYCHOLOGY | Facility: CLINIC | Age: 27
End: 2022-10-25
Payer: COMMERCIAL

## 2022-10-25 DIAGNOSIS — F43.21 ADJUSTMENT DISORDER WITH DEPRESSED MOOD: Primary | ICD-10-CM

## 2022-10-25 PROCEDURE — 90837 PSYTX W PT 60 MINUTES: CPT | Mod: GT | Performed by: MARRIAGE & FAMILY THERAPIST

## 2022-10-25 NOTE — PROGRESS NOTES
[facility] - Progress Note    Date of Service: 10/25/22   Name: Kd Slater  : 1995  Medical Record Number: [id number]  Treating Provider:   Type of Session: Individual  Present in Session: Client  Session Start and Stop Time: 9:02-10:00  Number of Minutes:  58    SERVICE MODALITY:  Video Visit:      Provider verified identity through the following two step process.  Patient provided:  Patient was verified at admission/transfer    Telemedicine Visit: The patient's condition can be safely assessed and treated via synchronous audio and visual telemedicine encounter.      Reason for Telemedicine Visit: Patient has requested telehealth visit    Originating Site (Patient Location): Patient's home    Distant Site (Provider Location): Provider Remote Setting- Home Office    Consent:  The patient/guardian has verbally consented to: the potential risks and benefits of telemedicine (video visit) versus in person care; bill my insurance or make self-payment for services provided; and responsibility for payment of non-covered services.     Patient would like the video invitation sent by:  Send to e-mail at: [email or web address]    Mode of Communication:  Video Conference via [email or web address]    Distant Location (Provider):  Off-site    As the provider I attest to compliance with applicable laws and regulations related to telemedicine.    DSM-5 Diagnoses:  Encounter Diagnosis   Name Primary?     Adjustment disorder with depressed mood Yes         Current Reported Symptoms and Status update:  Client experiencing increased depressed mood, thought disturbance, gender dysphoria, and social fear/anxiety, shame related to gender.    Progress Toward Treatment Goals:   Worsening     Therapeutic Interventions/Treatment Strategies:    Area(s) of treatment focus addressed in this session included Symptom Management and Gender Health      Psychotherapist offered support, feedback and validation and  reinforced use of skills Treatment modalities used include Gender Affirming Care Interpersonal Interventions include Symptoms Management: explored underlying causes of symptoms and Other: processed history of shame related to sexual and gender identity.  Support and Redirection    Patient Response:   Patient responded to session by accepting feedback, listening and focusing on goals  Possible barriers to participation / learning include: N/A    Current Mental Status Exam:   Appearance:  Appropriate   Eye Contact:  Good   Attitude / Demeanor: Cooperative   Speech      Rate / Production: Normal/ Responsive      Volume:  Normal  volume  Orientation:  All  Mood:   Depressed   Affect:   Appropriate   Thought Content: Clear   Insight:   Good       Plan/Need for Future Services:  Return for therapy in 2 weeks to treat diagnosed problems.    Patient has a current master individualized treatment plan.  See Epic treatment plan for more information.    Referral / Collaboration:  Referral to another professional/service is not indicated at this time..    Emergency Intervention needed?  No    Assignment:  none    Interactive Complexity:  There are four specific communication difficulties that complicate the work of the primary psychiatric procedure.  Interactive complexity (+[id number]) may be reported when at least one of these difficulties is present.    Communication difficulties present during current the psychiatric procedure include:  1. None.      Signature/Title:  Tenisha Stewart PsyD

## 2022-11-10 ENCOUNTER — VIRTUAL VISIT (OUTPATIENT)
Dept: PSYCHOLOGY | Facility: CLINIC | Age: 27
End: 2022-11-10
Payer: COMMERCIAL

## 2022-11-10 DIAGNOSIS — F43.21 ADJUSTMENT DISORDER WITH DEPRESSED MOOD: ICD-10-CM

## 2022-11-10 DIAGNOSIS — F64.0 GENDER DYSPHORIA IN ADULT: Primary | ICD-10-CM

## 2022-11-10 PROCEDURE — 90837 PSYTX W PT 60 MINUTES: CPT | Mod: GT | Performed by: MARRIAGE & FAMILY THERAPIST

## 2022-11-10 NOTE — PROGRESS NOTES
[facility] - Progress Note    Date of Service: 11/10/22   Name: Jef Slater (Calvin)  : 1995  Medical Record Number: [id number]  Treating Provider: Tenisha Stewart PsyD  Type of Session: Individual  Present in Session: Client  Session Start and Stop Time: 2:04-3:02  Number of Minutes:  58    SERVICE MODALITY:  Video Visit:      Provider verified identity through the following two step process.  Patient provided:  Patient was verified at admission/transfer    Telemedicine Visit: The patient's condition can be safely assessed and treated via synchronous audio and visual telemedicine encounter.      Reason for Telemedicine Visit: Patient convenience (e.g. access to timely appointments / distance to available provider)    Originating Site (Patient Location): Patient's home    Distant Site (Provider Location): [facility]    Consent:  The patient/guardian has verbally consented to: the potential risks and benefits of telemedicine (video visit) versus in person care; bill my insurance or make self-payment for services provided; and responsibility for payment of non-covered services.     Patient would like the video invitation sent by:  Send to e-mail at: [email or web address]    Mode of Communication:  Video Conference via [email or web address]    Distant Location (Provider):  On-site    As the provider I attest to compliance with applicable laws and regulations related to telemedicine.    DSM-5 Diagnoses:  Encounter Diagnoses   Name Primary?     Gender dysphoria in adult Yes     Adjustment disorder with depressed mood      Current Reported Symptoms and Status update:  Changes since last session: continued gender dysphoria, shame, internalized hatred, doubt, rumination.     Client has accepted identity, torn between wanting to be invisible and wanting to be seen. Experiences of discomfort in social situations.   Progress Toward Treatment Goals:    Satisfactory progress     Therapeutic Interventions/Treatment Strategies:    Area(s) of treatment focus addressed in this session included Symptom Management and Gender Health      Psychotherapist offered support, feedback and validation Treatment modalities used include Gender Affirming Care Interpersonal Symptoms Management: Promoted understanding of their diagnoses and how it impacts their functioning and Emotions Management:  Reinforced the purpose and biological basis of emotions  Support, Redirection and Education    Patient Response:   Patient responded to session by accepting feedback, verbalizing understanding and actively engaged  Possible barriers to participation / learning include: N/A    Current Mental Status Exam:   Appearance:  Appropriate   Eye Contact:  Good   Attitude / Demeanor: Cooperative   Speech      Rate / Production: Normal/ Responsive      Volume:  Normal  volume  Orientation:  All  Mood:   Normal  Affect:   Appropriate   Thought Content: Clear   Insight:   Good       Plan/Need for Future Services:  Return for therapy in 2 weeks to treat diagnosed problems.    Patient has a current master individualized treatment plan.  See Epic treatment plan for more information.    Referral / Collaboration:  Referral to another professional/service is not indicated at this time..  Emergency Services Needed?  No    Assignment:  Look at Lilliam Baron's worksheets on Gifts of Imperfection (Shame and identity).     Interactive Complexity:  There are four specific communication difficulties that complicate the work of the primary psychiatric procedure.  Interactive complexity (+02957) may be reported when at least one of these difficulties is present.    Communication difficulties present during current the psychiatric procedure include:  1. None.      Signature/Title:        Tenisha Stewart PsyD

## 2022-12-01 ENCOUNTER — VIRTUAL VISIT (OUTPATIENT)
Dept: PSYCHOLOGY | Facility: CLINIC | Age: 27
End: 2022-12-01
Payer: COMMERCIAL

## 2022-12-01 DIAGNOSIS — F64.0 GENDER DYSPHORIA IN ADULT: Primary | ICD-10-CM

## 2022-12-01 DIAGNOSIS — F43.21 ADJUSTMENT DISORDER WITH DEPRESSED MOOD: ICD-10-CM

## 2022-12-01 PROCEDURE — 90837 PSYTX W PT 60 MINUTES: CPT | Mod: GT | Performed by: MARRIAGE & FAMILY THERAPIST

## 2022-12-01 NOTE — PROGRESS NOTES
[facility] - Progress Note    Date of Service: 22   Name: Kd Slater  : 1995  Medical Record Number: [id number]  Treating Provider: Tenisha Stewart PsyD  Type of Session: Individual  Present in Session: Client  Session Start and Stop Time: 1:04-2:00  Number of Minutes: 56    SERVICE MODALITY:  Video Visit:      Provider verified identity through the following two step process.  Patient provided:  Patient was verified at admission/transfer    Telemedicine Visit: The patient's condition can be safely assessed and treated via synchronous audio and visual telemedicine encounter.      Reason for Telemedicine Visit: Patient convenience (e.g. access to timely appointments / distance to available provider)    Originating Site (Patient Location): Patient's home    Distant Site (Provider Location): [facility]    Consent:  The patient/guardian has verbally consented to: the potential risks and benefits of telemedicine (video visit) versus in person care; bill my insurance or make self-payment for services provided; and responsibility for payment of non-covered services.     Patient would like the video invitation sent by:  Send to e-mail at: [email or web address]    Mode of Communication:  Video Conference via [email or web address]    Distant Location (Provider):  On-site    As the provider I attest to compliance with applicable laws and regulations related to telemedicine.    DSM-5 Diagnoses:  Encounter Diagnoses   Name Primary?     Gender dysphoria in adult Yes     Adjustment disorder with depressed mood        Current Reported Symptoms and Status update:  Changes since last session- client endorsed continued shame, self-hatred, gender dysphoria, social anxiety.    Progress Toward Treatment Goals:   Stable/Maintenance of progress     Therapeutic Interventions/Treatment Strategies:    Area(s) of treatment focus addressed in this session included  Symptom Management and Gender Health        Psychotherapist offered support, feedback and validation and reinforced use of skills Treatment modalities used include Gender Affirming Care Interpersonal Symptoms Management: Promoted understanding of their diagnoses and how it impacts their functioning and Emotions Management:  Explored root of shame, self-hatred  Support, Redirection, Feedback and Clarification    Patient Response:   Patient responded to session by verbalizing understanding and actively engaged  Possible barriers to participation / learning include: N/A    Current Mental Status Exam:   Appearance:  Appropriate   Eye Contact:  Good   Attitude / Demeanor: Interested  Speech      Rate / Production: Normal/ Responsive      Volume:  Normal  volume  Orientation:  All  Mood:   Normal  Affect:   Appropriate   Thought Content: Clear   Insight:   Good       Plan/Need for Future Services:  Return for therapy in 2 weeks to treat diagnosed problems.    Patient has a current master individualized treatment plan.  See Epic treatment plan for more information.    Referral / Collaboration:  Referral to another professional/service is not indicated at this time..  Emergency Services Needed?  No    Assignment:  None    Interactive Complexity:  There are four specific communication difficulties that complicate the work of the primary psychiatric procedure.  Interactive complexity (+47713) may be reported when at least one of these difficulties is present.    Communication difficulties present during current the psychiatric procedure include:  1. None.      Signature/Title:      Tenisha Stewart PsyD

## 2022-12-15 ENCOUNTER — VIRTUAL VISIT (OUTPATIENT)
Dept: PSYCHOLOGY | Facility: CLINIC | Age: 27
End: 2022-12-15
Payer: COMMERCIAL

## 2022-12-15 DIAGNOSIS — F43.21 ADJUSTMENT DISORDER WITH DEPRESSED MOOD: ICD-10-CM

## 2022-12-15 DIAGNOSIS — F64.0 GENDER DYSPHORIA IN ADULT: Primary | ICD-10-CM

## 2022-12-15 PROCEDURE — 90837 PSYTX W PT 60 MINUTES: CPT | Mod: GT | Performed by: MARRIAGE & FAMILY THERAPIST

## 2022-12-15 NOTE — PROGRESS NOTES
[facility] - Progress Note    Date of Service: 12/15/22   Name: Jef Slater (Calvin)  : 1995  Medical Record Number: [id number]  Treating Provider: Tenisha Stewart PsyD  Type of Session: Individual  Present in Session: Client  Session Start and Stop Time: 2:03-2:58  Number of Minutes: 55    SERVICE MODALITY:  Video Visit:      Provider verified identity through the following two step process.  Patient provided:  Patient was verified at admission/transfer    Telemedicine Visit: The patient's condition can be safely assessed and treated via synchronous audio and visual telemedicine encounter.      Reason for Telemedicine Visit: Patient convenience (e.g. access to timely appointments / distance to available provider)    Originating Site (Patient Location): Patient's home    Distant Site (Provider Location): Provider Remote Setting- Home Office    Consent:  The patient/guardian has verbally consented to: the potential risks and benefits of telemedicine (video visit) versus in person care; bill my insurance or make self-payment for services provided; and responsibility for payment of non-covered services.     Patient would like the video invitation sent by:  Send to e-mail at: [email or web address]    Mode of Communication:  Video Conference via [email or web address]    Distant Location (Provider):  Off-site    As the provider I attest to compliance with applicable laws and regulations related to telemedicine.    DSM-5 Diagnoses:  Encounter Diagnoses   Name Primary?     Gender dysphoria in adult Yes     Adjustment disorder with depressed mood      Current Reported Symptoms and Status update:  Changes since last session: depressed mood, social fear, paranoia, depersonalization/derealization re: gender dysphoria and discrimination.  Progress Toward Treatment Goals:   Worsening     Therapeutic Interventions/Treatment Strategies:    Area(s) of treatment focus addressed in this session included  Symptom Management, Personal Safety/Harm Reduction, Gender Health and Develop Socialization     Psychotherapist offered support, feedback and validation and reinforced use of skills Treatment modalities used include Gender Affirming Care Interpersonal Radical Healing  Coping Skills: Addressed barriers to utilizing coping skills when in distress, Symptoms Management: Promoted understanding of their diagnoses and how it impacts their functioning and Emotions Management:  explored symptoms and fears, normalized.  Support, Redirection and Safety Assessments    Patient Response:   Patient responded to session by verbalizing understanding and actively engaged  Possible barriers to participation / learning include: severity of symptoms and system oppression    Current Mental Status Exam:   Appearance:  Appropriate   Eye Contact:  Good   Attitude / Demeanor: Cooperative   Speech      Rate / Production: Emotional      Volume:  Normal  volume  Orientation:  All  Mood:   Depressed   Affect:   Tearful  Thought Content: Clear  Paranoia   Insight:   Good       Plan/Need for Future Services:  Return for therapy in 1 weeks to treat diagnosed problems.    Patient has a current master individualized treatment plan.  See Epic treatment plan for more information.    Referral / Collaboration:  Referral to another professional/service is not indicated at this time..  Emergency Services Needed?  No    Assignment:  Explore groups, [place] [facility] [place]. F/U with provider re: support groups.    Interactive Complexity:  There are four specific communication difficulties that complicate the work of the primary psychiatric procedure.  Interactive complexity (+37129) may be reported when at least one of these difficulties is present.    Communication difficulties present during current the psychiatric procedure include:  1. None.      Signature/Title:      Tenisha Stewart PsyD

## 2022-12-21 ENCOUNTER — VIRTUAL VISIT (OUTPATIENT)
Dept: PSYCHOLOGY | Facility: CLINIC | Age: 27
End: 2022-12-21
Payer: COMMERCIAL

## 2022-12-21 DIAGNOSIS — F64.0 GENDER DYSPHORIA IN ADULT: Primary | ICD-10-CM

## 2022-12-21 DIAGNOSIS — F43.21 ADJUSTMENT DISORDER WITH DEPRESSED MOOD: ICD-10-CM

## 2022-12-21 PROCEDURE — 90837 PSYTX W PT 60 MINUTES: CPT | Mod: GT | Performed by: MARRIAGE & FAMILY THERAPIST

## 2022-12-21 NOTE — PROGRESS NOTES
[facility] - Progress Note    Date of Service: 22   Name: Kd Slater  : 1995  Medical Record Number: [id number]  Treating Provider: Tenisha Stewart PsyD  Type of Session: Individual  Present in Session: client  Session Start and Stop Time: 9:03-10:00  Number of Minutes:  57    SERVICE MODALITY:  Video Visit:      Provider verified identity through the following two step process.  Patient provided:  Patient photo and Patient     Telemedicine Visit: The patient's condition can be safely assessed and treated via synchronous audio and visual telemedicine encounter.      Reason for Telemedicine Visit: Patient has requested telehealth visit    Originating Site (Patient Location): Patient's home    Distant Site (Provider Location): Provider Remote Setting- Home Office    Consent:  The patient/guardian has verbally consented to: the potential risks and benefits of telemedicine (video visit) versus in person care; bill my insurance or make self-payment for services provided; and responsibility for payment of non-covered services.     Patient would like the video invitation sent by:  Send to e-mail at: [email or web address]    Mode of Communication:  Video Conference via [email or web address]    Distant Location (Provider):  Off-site    As the provider I attest to compliance with applicable laws and regulations related to telemedicine.    DSM-5 Diagnoses:  Encounter Diagnoses   Name Primary?     Gender dysphoria in adult Yes     Adjustment disorder with depressed mood      Current Reported Symptoms and Status update:  Changes since last session- client reported decrease in paranoia, anxiety, distress about dysphoria.    Progress Toward Treatment Goals:   Satisfactory progress     Therapeutic Interventions/Treatment Strategies:    Area(s) of treatment focus addressed in this session included Symptom Management and Gender Health      Psychotherapist offered support, feedback and  validation and reinforced use of skills Treatment modalities used include Interpersonal Humanist Emotions Management:  Discussed barriers to emotional regulation, Other: Assisted patient  in finding ways to adapt functioning in light of past traumatic experiences and Relationship Skills: Discussed strategies to promote healthier understanding of interpersonal relationships  Support, Redirection and Feedback    Patient Response:   Patient responded to session by actively engaged  Possible barriers to participation / learning include: contextual issues and system oppression    Current Mental Status Exam:   Appearance:  Appropriate   Eye Contact:  Good   Attitude / Demeanor: Cooperative   Speech      Rate / Production: Normal/ Responsive      Volume:  Normal  volume  Orientation:  All  Mood:   Anxious   Affect:   Appropriate   Thought Content: Clear   Insight:   Good       Plan/Need for Future Services:  Return for therapy in 1 weeks to treat diagnosed problems.    Patient has a current master individualized treatment plan.  See Epic treatment plan for more information.    Referral / Collaboration:  Referral to another professional/service is not indicated at this time..  Emergency Services Needed?  No    Assignment:  None    Interactive Complexity:  There are four specific communication difficulties that complicate the work of the primary psychiatric procedure.  Interactive complexity (+36775) may be reported when at least one of these difficulties is present.    Communication difficulties present during current the psychiatric procedure include:  1. None.      Signature/Title:    Tenisha Stewart PsyD

## 2022-12-29 ENCOUNTER — VIRTUAL VISIT (OUTPATIENT)
Dept: PSYCHOLOGY | Facility: CLINIC | Age: 27
End: 2022-12-29
Payer: COMMERCIAL

## 2022-12-29 DIAGNOSIS — F43.21 ADJUSTMENT DISORDER WITH DEPRESSED MOOD: ICD-10-CM

## 2022-12-29 DIAGNOSIS — F64.0 GENDER DYSPHORIA IN ADULT: Primary | ICD-10-CM

## 2022-12-29 PROCEDURE — 90837 PSYTX W PT 60 MINUTES: CPT | Mod: GT | Performed by: MARRIAGE & FAMILY THERAPIST

## 2022-12-29 NOTE — PROGRESS NOTES
[facility] - Progress Note    Date of Service: 22   Name: Kd Slater  : 1995  Medical Record Number: [id number]  Treating Provider: Tenisha Stewart PsyD  Type of Session: Individual  Present in Session: Client   Session Start and Stop Time: 2:03- 3:00  Number of Minutes: 57    SERVICE MODALITY:  Video Visit:      Provider verified identity through the following two step process.  Patient provided:  Patient  and Patient address    Telemedicine Visit: The patient's condition can be safely assessed and treated via synchronous audio and visual telemedicine encounter.      Reason for Telemedicine Visit: Patient has requested telehealth visit    Originating Site (Patient Location): Patient's home    Distant Site (Provider Location): [facility]    Consent:  The patient/guardian has verbally consented to: the potential risks and benefits of telemedicine (video visit) versus in person care; bill my insurance or make self-payment for services provided; and responsibility for payment of non-covered services.     Patient would like the video invitation sent by:  Send to e-mail at: [email or web address]    Mode of Communication:  Video Conference via [email or web address]    Distant Location (Provider):  On-site    As the provider I attest to compliance with applicable laws and regulations related to telemedicine.    DSM-5 Diagnoses:  Encounter Diagnoses   Name Primary?     Gender dysphoria in adult Yes     Adjustment disorder with depressed mood      Current Reported Symptoms and Status update:  Changes since last session: reduction in anxiety, depressed mood, continued dysphoria.    Progress Toward Treatment Goals:   Minimal progress     Therapeutic Interventions/Treatment Strategies:    Area(s) of treatment focus addressed in this session included Symptom Management, Interpersonal Relationship Skills and Gender Health    Explored hx internal  working models, hypervigilance, family history.    Psychotherapist offered support, feedback and validation and reinforced use of skills Treatment modalities used include Gender Affirming Care Interpersonal Symptoms Management: Promoted understanding of their diagnoses and how it impacts their functioning and Emotions Management:  Increased awareness of daily mood patterns/changes  Support and Redirection    Patient Response:   Patient responded to session by verbalizing understanding and actively engaged  Possible barriers to participation / learning include: N/A    Current Mental Status Exam:   Appearance:  Appropriate   Eye Contact:  Good   Attitude / Demeanor: Cooperative   Speech      Rate / Production: Normal/ Responsive      Volume:  Normal  volume  Orientation:  All  Mood:   Normal  Affect:   Appropriate   Thought Content: Clear   Insight:   Good       Plan/Need for Future Services:  Return for therapy in 2 weeks to treat diagnosed problems.    Patient has a current master individualized treatment plan.  See Epic treatment plan for more information.    Referral / Collaboration:  Referral to another professional/service is not indicated at this time..  Emergency Services Needed?  No    Assignment:  None    Interactive Complexity:  There are four specific communication difficulties that complicate the work of the primary psychiatric procedure.  Interactive complexity (+60178) may be reported when at least one of these difficulties is present.    Communication difficulties present during current the psychiatric procedure include:  1. None.      Signature/Title:    Tenisha Stewart PsyD

## 2023-01-20 ENCOUNTER — VIRTUAL VISIT (OUTPATIENT)
Dept: PSYCHOLOGY | Facility: CLINIC | Age: 28
End: 2023-01-20
Payer: COMMERCIAL

## 2023-01-20 DIAGNOSIS — F43.21 ADJUSTMENT DISORDER WITH DEPRESSED MOOD: ICD-10-CM

## 2023-01-20 DIAGNOSIS — F64.0 GENDER DYSPHORIA IN ADULT: Primary | ICD-10-CM

## 2023-01-20 PROCEDURE — 90837 PSYTX W PT 60 MINUTES: CPT | Mod: GT | Performed by: MARRIAGE & FAMILY THERAPIST

## 2023-01-20 NOTE — PROGRESS NOTES
[facility] - Progress Note    Date of Service: 23   Name: Kd Slater  : 1995  Medical Record Number: [id number]  Treating Provider: Tenisha Stewart PsyD  Type of Session: Individual  Present in Session: Client  Session Start and Stop Time: 10:32-11:30  Number of Minutes: 58    SERVICE MODALITY:  Video Visit:      Provider verified identity through the following two step process.  Patient provided:  Patient was verified at admission/transfer    Telemedicine Visit: The patient's condition can be safely assessed and treated via synchronous audio and visual telemedicine encounter.      Reason for Telemedicine Visit: Patient convenience (e.g. access to timely appointments / distance to available provider)    Originating Site (Patient Location): Patient's home    Distant Site (Provider Location): [facility]    Consent:  The patient/guardian has verbally consented to: the potential risks and benefits of telemedicine (video visit) versus in person care; bill my insurance or make self-payment for services provided; and responsibility for payment of non-covered services.     Patient would like the video invitation sent by:  Send to e-mail at: [email or web address]    Mode of Communication:  Video Conference via [email or web address]    Distant Location (Provider):  On-site    As the provider I attest to compliance with applicable laws and regulations related to telemedicine.    DSM-5 Diagnoses:  Encounter Diagnoses   Name Primary?     Gender dysphoria in adult Yes     Adjustment disorder with depressed mood      Current Reported Symptoms and Status update:  Changes since last session: increased dysphoria, depressed mood, SI, impression management.     Progress Toward Treatment Goals:   Worsening     Therapeutic Interventions/Treatment Strategies:    Area(s) of treatment focus addressed in this session included Symptom Management, Personal Safety/Harm  Reduction and Gender Health    Psychotherapist offered support, feedback and validation, provided redirection and reinforced use of skills Treatment modalities used include Gender Affirming Care Interpersonal Radical Healing  Cognitive Restructuring:  Facilitated recognition of the connection between negative thoughts and negative core beliefs, Coping Skills: Facilitated understanding of  what factors may contribute to symptom relapse and skills plan to manage symptom relapse , Emotions Management:  Increased awareness of daily mood patterns/changes and Other: Assisted patient  in finding ways to adapt functioning in light of past traumatic experiences  Support, Redirection and Safety Assessments    Patient Response:   Patient responded to session by verbalizing understanding and actively engaged  Possible barriers to participation / learning include: N/A    Current Mental Status Exam:   Appearance:  Appropriate   Eye Contact:  Good   Attitude / Demeanor: Cooperative   Speech      Rate / Production: Normal/ Responsive      Volume:  Normal  volume  Orientation:  All  Mood:   Depressed   Affect:   Appropriate   Thought Content: Clear   Insight:   Good       Plan/Need for Future Services:  Return for therapy in 2 weeks to treat diagnosed problems.    Patient has a current master individualized treatment plan.  See Epic treatment plan for more information.    Referral / Collaboration:  Referral to another professional/service is not indicated at this time..  Emergency Services Needed?  No    Assignment:  None    Interactive Complexity:  There are four specific communication difficulties that complicate the work of the primary psychiatric procedure.  Interactive complexity (+07540) may be reported when at least one of these difficulties is present.    Communication difficulties present during current the psychiatric procedure include:  1. None.      Signature/Title:      Tenisha Stewart PsyD

## 2023-02-03 ENCOUNTER — VIRTUAL VISIT (OUTPATIENT)
Dept: PSYCHOLOGY | Facility: CLINIC | Age: 28
End: 2023-02-03
Payer: COMMERCIAL

## 2023-02-03 DIAGNOSIS — F64.0 GENDER DYSPHORIA IN ADULT: Primary | ICD-10-CM

## 2023-02-03 DIAGNOSIS — F43.21 ADJUSTMENT DISORDER WITH DEPRESSED MOOD: ICD-10-CM

## 2023-02-03 PROCEDURE — 90837 PSYTX W PT 60 MINUTES: CPT | Mod: VID | Performed by: MARRIAGE & FAMILY THERAPIST

## 2023-02-03 NOTE — PROGRESS NOTES
[facility] - Progress Note    Date of Service: 23   Name: Jef Slater (Calvin)  : 1995  Medical Record Number: [id number]  Treating Provider: Tenisha Stewart PsyD  Type of Session: Individual  Present in Session: Client  Session Start and Stop Time: 10:30-11:25  Number of Minutes: 55    SERVICE MODALITY:  Video Visit:      Provider verified identity through the following two step process.  Patient provided:  Patient was verified at admission/transfer    Telemedicine Visit: The patient's condition can be safely assessed and treated via synchronous audio and visual telemedicine encounter.      Reason for Telemedicine Visit: Patient convenience (e.g. access to timely appointments / distance to available provider)    Originating Site (Patient Location): Patient's home    Distant Site (Provider Location): Provider Remote Setting- Home Office    Consent:  The patient/guardian has verbally consented to: the potential risks and benefits of telemedicine (video visit) versus in person care; bill my insurance or make self-payment for services provided; and responsibility for payment of non-covered services.     Patient would like the video invitation sent by:  Send to e-mail at: [email or web address]    Mode of Communication:  Video Conference via [email or web address]    Distant Location (Provider):  Off-site    As the provider I attest to compliance with applicable laws and regulations related to telemedicine.    DSM-5 Diagnoses:  Encounter Diagnoses   Name Primary?     Gender dysphoria in adult      Adjustment disorder with depressed mood Yes     Current Reported Symptoms and Status update:  Changes since last session: increased dysphoria, increased anxiety, paranoia, persecutory thinking.    Progress Toward Treatment Goals:   Worsening     Therapeutic Interventions/Treatment Strategies:    Area(s) of treatment focus addressed in this session included Symptom Management, Personal Safety/Harm  Reduction, Interpersonal Relationship Skills and Gender Health    Psychotherapist offered support, feedback and validation and provided redirection Treatment modalities used include Gender Affirming Care Interpersonal Cognitive Restructuring:  Explored impact of ineffective thoughts / distortions on mood and activity, Symptoms Management: Promoted understanding of their diagnoses and how it impacts their functioning and Other: Assisted patient  in finding ways to adapt functioning in light of past traumatic experiences and Explored with patient how life transitions may impact mental health and functioning   Support, Redirection, Limit/Boundaries and Clarification    Patient Response:   Patient responded to session by verbalizing understanding and actively engaged  Possible barriers to participation / learning include: contextual issues and system oppression    Current Mental Status Exam:   Appearance:  Appropriate   Eye Contact:  Good   Attitude / Demeanor: Cooperative   Speech      Rate / Production: Emotional      Volume:  Normal  volume  Orientation:  All  Mood:   Fearful  Affect:   Tearful Worrisome   Thought Content: Paranoia   Insight:   Good       Plan/Need for Future Services:  Return for therapy in 1 weeks to treat diagnosed problems.    Patient has a current master individualized treatment plan.  See Epic treatment plan for more information.    Referral / Collaboration:  The following referral(s) will be initiated: psychological testing.  Emergency Services Needed?  No    Assignment:  None.    Interactive Complexity:  There are four specific communication difficulties that complicate the work of the primary psychiatric procedure.  Interactive complexity (+43382) may be reported when at least one of these difficulties is present.    Communication difficulties present during current the psychiatric procedure include:  1. None.      Signature/Title:    Tenisha Stewart PsyD

## 2023-02-10 ENCOUNTER — VIRTUAL VISIT (OUTPATIENT)
Dept: PSYCHOLOGY | Facility: CLINIC | Age: 28
End: 2023-02-10
Payer: COMMERCIAL

## 2023-02-10 DIAGNOSIS — F64.0 GENDER DYSPHORIA IN ADULT: Primary | ICD-10-CM

## 2023-02-10 DIAGNOSIS — F43.21 ADJUSTMENT DISORDER WITH DEPRESSED MOOD: ICD-10-CM

## 2023-02-10 PROCEDURE — 90837 PSYTX W PT 60 MINUTES: CPT | Mod: VID | Performed by: MARRIAGE & FAMILY THERAPIST

## 2023-02-10 NOTE — NURSING NOTE
Is the patient currently in the state of [place]? YES    Visit mode:VIDEO    If the visit is dropped, the patient can be reconnected by: VIDEO VISIT: Send to e-mail at: [email or web address]    Will anyone else be joining the visit? NO      How would you like to obtain your AVS? MyChart    Are changes needed to the allergy or medication list? NO , N/A    ANGELINA Fofana

## 2023-02-10 NOTE — PROGRESS NOTES
[facility] - Progress Note    Date of Service: 2/10/23   Name: Jef Slater (Calvin)  : 1995  Medical Record Number: [id number]  Treating Provider: Tenisha Stewart PsyD  Type of Session: Individual  Present in Session: Client  Session Start and Stop Time: 10:32-11:30  Number of Minutes:  58    SERVICE MODALITY:  Video Visit:      Provider verified identity through the following two step process.  Patient provided:  Patient was verified at admission/transfer    Telemedicine Visit: The patient's condition can be safely assessed and treated via synchronous audio and visual telemedicine encounter.      Reason for Telemedicine Visit: Patient convenience (e.g. access to timely appointments / distance to available provider)    Originating Site (Patient Location): Patient's home    Distant Site (Provider Location): Provider Remote Setting- Home Office    Consent:  The patient/guardian has verbally consented to: the potential risks and benefits of telemedicine (video visit) versus in person care; bill my insurance or make self-payment for services provided; and responsibility for payment of non-covered services.     Patient would like the video invitation sent by:  Send to e-mail at: [email or web address]    Mode of Communication:  Video Conference via [email or web address]    Distant Location (Provider):  Off-site    As the provider I attest to compliance with applicable laws and regulations related to telemedicine.    DSM-5 Diagnoses:  Encounter Diagnoses   Name Primary?     Gender dysphoria in adult Yes     Adjustment disorder with depressed mood      Current Reported Symptoms and Status update:  Changes since last session: increased social dysphoria, sadness, depressed mood, paranoia.    Progress Toward Treatment Goals:   Minimal progress     Therapeutic Interventions/Treatment Strategies:    Area(s) of treatment focus addressed in this session included Symptom Management, Personal Safety/Harm  Reduction and Gender Health    Psychotherapist offered support, feedback and validation Treatment modalities used include Gender Affirming Care Interpersonal Radical Healing  Coping Skills: Addressed barriers to utilizing coping skills when in distress, Symptoms Management: Promoted understanding of their diagnoses and how it impacts their functioning, Emotions Management:  Increased awareness of daily mood patterns/changes and Other: Assisted patient  in finding ways to adapt functioning in light of past traumatic experiences  Support and Redirection    Patient Response:   Patient responded to session by verbalizing understanding and actively engaged  Possible barriers to participation / learning include: N/A    Current Mental Status Exam:   Appearance:  Appropriate   Eye Contact:  Good   Attitude / Demeanor: Cooperative   Speech      Rate / Production: Emotional      Volume:  Normal  volume  Orientation:  All  Mood:   Dysphoric  Affect:   Labile  Tearful  Thought Content: Clear   Insight:   Good       Plan/Need for Future Services:  Return for therapy in 2 weeks to treat diagnosed problems.    Patient has a current master individualized treatment plan.  See Epic treatment plan for more information.    Referral / Collaboration:  Referral to another professional/service is not indicated at this time..  Emergency Services Needed?  No    Assignment:  None    Interactive Complexity:  There are four specific communication difficulties that complicate the work of the primary psychiatric procedure.  Interactive complexity (+69808) may be reported when at least one of these difficulties is present.    Communication difficulties present during current the psychiatric procedure include:  1. None.      Signature/Title:    Tenisha Stewart PsyD

## 2023-02-17 ENCOUNTER — VIRTUAL VISIT (OUTPATIENT)
Dept: PSYCHOLOGY | Facility: CLINIC | Age: 28
End: 2023-02-17
Payer: COMMERCIAL

## 2023-02-17 DIAGNOSIS — F43.21 ADJUSTMENT DISORDER WITH DEPRESSED MOOD: ICD-10-CM

## 2023-02-17 DIAGNOSIS — F64.0 GENDER DYSPHORIA IN ADULT: Primary | ICD-10-CM

## 2023-02-17 PROCEDURE — 90837 PSYTX W PT 60 MINUTES: CPT | Mod: VID | Performed by: MARRIAGE & FAMILY THERAPIST

## 2023-02-17 NOTE — NURSING NOTE
Is the patient currently in the state of [place]? YES    Visit mode:VIDEO    If the visit is dropped, the patient can be reconnected by: Joining via [email or web address] - already has the link    Will anyone else be joining the visit? NO  (If patient encounters technical issues they should call [phone])    How would you like to obtain your AVS? MyChart    Are changes needed to the allergy or medication list? N/A    PHQ not done per department protocol.    Comments or concerns regarding today's visit: None    ANGELINA Lomeli

## 2023-02-17 NOTE — PROGRESS NOTES
[facility] - Progress Note    Date of Service: 23   Name: Kd Slater  : 1995  Medical Record Number: [id number]  Treating Provider: Tenisha Stewart PsyD  Type of Session: Individual  Present in Session: Client  Session Start and Stop Time: 10:32-11:28  Number of Minutes: 56    SERVICE MODALITY:  Video Visit:      Provider verified identity through the following two step process.  Patient provided:  Patient was verified at admission/transfer    Telemedicine Visit: The patient's condition can be safely assessed and treated via synchronous audio and visual telemedicine encounter.      Reason for Telemedicine Visit: Patient convenience (e.g. access to timely appointments / distance to available provider)    Originating Site (Patient Location): Patient's home    Distant Site (Provider Location): Provider Remote Setting- Home Office    Consent:  The patient/guardian has verbally consented to: the potential risks and benefits of telemedicine (video visit) versus in person care; bill my insurance or make self-payment for services provided; and responsibility for payment of non-covered services.     Patient would like the video invitation sent by:  Send to e-mail at: [email or web address]    Mode of Communication:  Video Conference via [email or web address]    Distant Location (Provider):  Off-site    As the provider I attest to compliance with applicable laws and regulations related to telemedicine.    DSM-5 Diagnoses:  Encounter Diagnoses   Name Primary?     Adjustment disorder with depressed mood      Gender dysphoria in adult Yes     Current Reported Symptoms and Status update:  Changes since last session: positive improvement related to dysphoria, insight around feeling paranoid/stuck.     Progress Toward Treatment Goals:   Satisfactory progress     Therapeutic Interventions/Treatment Strategies:    Area(s) of treatment focus addressed in this session included Personal Safety/Harm  Reduction, Interpersonal Relationship Skills, Gender Health and Develop Socialization     Psychotherapist offered support, feedback and validation, provided redirection and reinforced use of skills Treatment modalities used include Gender Affirming Care Interpersonal Radical Healing  Emotions Management:  Discussed barriers to emotional regulation and Increased awareness of daily mood patterns/changes and Relationship Skills: Encouraged development and maintenance  of healthy boundaries and Discussed strategies to promote healthier understanding of interpersonal relationships  Support, Redirection and Clarification    Patient Response:   Patient responded to session by verbalizing understanding and actively engaged  Possible barriers to participation / learning include: N/A    Current Mental Status Exam:   Appearance:  Appropriate   Eye Contact:  Good   Attitude / Demeanor: Cooperative   Speech      Rate / Production: Normal/ Responsive      Volume:  Normal  volume  Orientation:  All  Mood:   Normal  Affect:   Expansive   Thought Content: Clear   Insight:   Good       Plan/Need for Future Services:  Return for therapy in 1-2 weeks to treat diagnosed problems.    Patient has a current master individualized treatment plan.  See Epic treatment plan for more information.    Referral / Collaboration:  Referral to another professional/service is not indicated at this time..  Emergency Services Needed?  No    Assignment:  None    Interactive Complexity:  There are four specific communication difficulties that complicate the work of the primary psychiatric procedure.  Interactive complexity (+81894) may be reported when at least one of these difficulties is present.    Communication difficulties present during current the psychiatric procedure include:  1. None.      Signature/Title:    Tenisha Stewart PsyD

## 2023-03-24 ENCOUNTER — VIRTUAL VISIT (OUTPATIENT)
Dept: PSYCHOLOGY | Facility: CLINIC | Age: 28
End: 2023-03-24
Payer: COMMERCIAL

## 2023-03-24 DIAGNOSIS — F43.21 ADJUSTMENT DISORDER WITH DEPRESSED MOOD: Primary | ICD-10-CM

## 2023-03-24 DIAGNOSIS — F64.0 GENDER DYSPHORIA IN ADULT: ICD-10-CM

## 2023-03-24 PROCEDURE — 90837 PSYTX W PT 60 MINUTES: CPT | Mod: VID | Performed by: MARRIAGE & FAMILY THERAPIST

## 2023-03-24 NOTE — PROGRESS NOTES
[facility] - Progress Note    Date of Service: 3/24/23   Name: Asaf Slater  : 1995  Medical Record Number: [id number]  Treating Provider: Tenisha Stewart PsyD   Type of Session: Individual  Present in Session: Client  Session Start and Stop Time: 10:30-11:26  Number of Minutes:  54    SERVICE MODALITY:  Video Visit:      Provider verified identity through the following two step process.  Patient provided:  Patient was verified at admission/transfer    Telemedicine Visit: The patient's condition can be safely assessed and treated via synchronous audio and visual telemedicine encounter.      Reason for Telemedicine Visit: Patient convenience (e.g. access to timely appointments / distance to available provider)    Originating Site (Patient Location): Patient's home    Distant Site (Provider Location): [facility]    Consent:  The patient/guardian has verbally consented to: the potential risks and benefits of telemedicine (video visit) versus in person care; bill my insurance or make self-payment for services provided; and responsibility for payment of non-covered services.     Patient would like the video invitation sent by:  Send to e-mail at: [email or web address]    Mode of Communication:  Video Conference via [email or web address]    Distant Location (Provider):  On-site    As the provider I attest to compliance with applicable laws and regulations related to telemedicine.    DSM-5 Diagnoses:  Encounter Diagnoses   Name Primary?     Adjustment disorder with depressed mood Yes     Gender dysphoria in adult      Current Reported Symptoms and Status update:  Changes since last session: improved mood, bouts of depression and anxiety, decrease in dysphoria.    Progress Toward Treatment Goals:   Satisfactory progress    Currently job searching    Therapeutic Interventions/Treatment Strategies:    Area(s) of treatment focus addressed in this session  included Symptom Management, Interpersonal Relationship Skills and Gender Health    Psychotherapist offered support, feedback and validation and reinforced use of skills Treatment modalities used include Interpersonal Other: Explored with patient how life transitions may impact mental health and functioning  and explored perceptions of self and other.  Support, Redirection and Feedback    Patient Response:   Patient responded to session by verbalizing understanding and actively engaged  Possible barriers to participation / learning include: N/A    Current Mental Status Exam:   Appearance:  Appropriate   Eye Contact:  Good   Attitude / Demeanor: Cooperative   Speech      Rate / Production: Normal/ Responsive      Volume:  Normal  volume  Orientation:  All  Mood:   Normal  Affect:   Appropriate   Thought Content: Clear   Insight:   Good       Plan/Need for Future Services:  Return for therapy in 1-2 weeks to treat diagnosed problems.    Patient has a current master individualized treatment plan.  See Epic treatment plan for more information.    Referral / Collaboration:  Referral to another professional/service is not indicated at this time..  Emergency Services Needed?  No    Assignment:  None    Interactive Complexity:  There are four specific communication difficulties that complicate the work of the primary psychiatric procedure.  Interactive complexity (+47546) may be reported when at least one of these difficulties is present.    Communication difficulties present during current the psychiatric procedure include:  1. None.      Signature/Title:     Tenisha Stewart PsyD

## 2023-03-24 NOTE — NURSING NOTE
Is the patient currently in the state of [place]? YES    Visit mode:VIDEO    If the visit is dropped, the patient can be reconnected by: VIDEO VISIT:  Send e-mail to at [email or web address]    Will anyone else be joining the visit? No  (If patient encounters technical issues they should call [phone])    How would you like to obtain your AVS? MyChart    Are changes needed to the allergy or medication list? NO    Rooming Documentation: Questionnaire(s) not assigned, not done per department protocol    Reason for visit:  Follow Up    ANGELINA Ha

## 2023-03-31 ENCOUNTER — VIRTUAL VISIT (OUTPATIENT)
Dept: PSYCHOLOGY | Facility: CLINIC | Age: 28
End: 2023-03-31
Payer: COMMERCIAL

## 2023-03-31 DIAGNOSIS — F43.21 ADJUSTMENT DISORDER WITH DEPRESSED MOOD: Primary | ICD-10-CM

## 2023-03-31 DIAGNOSIS — F64.0 GENDER DYSPHORIA IN ADULT: ICD-10-CM

## 2023-03-31 PROCEDURE — 90837 PSYTX W PT 60 MINUTES: CPT | Mod: VID | Performed by: MARRIAGE & FAMILY THERAPIST

## 2023-03-31 ASSESSMENT — PATIENT HEALTH QUESTIONNAIRE - PHQ9
SUM OF ALL RESPONSES TO PHQ QUESTIONS 1-9: 8
SUM OF ALL RESPONSES TO PHQ QUESTIONS 1-9: 8
10. IF YOU CHECKED OFF ANY PROBLEMS, HOW DIFFICULT HAVE THESE PROBLEMS MADE IT FOR YOU TO DO YOUR WORK, TAKE CARE OF THINGS AT HOME, OR GET ALONG WITH OTHER PEOPLE: SOMEWHAT DIFFICULT

## 2023-03-31 ASSESSMENT — ANXIETY QUESTIONNAIRES
8. IF YOU CHECKED OFF ANY PROBLEMS, HOW DIFFICULT HAVE THESE MADE IT FOR YOU TO DO YOUR WORK, TAKE CARE OF THINGS AT HOME, OR GET ALONG WITH OTHER PEOPLE?: SOMEWHAT DIFFICULT
2. NOT BEING ABLE TO STOP OR CONTROL WORRYING: NEARLY EVERY DAY
7. FEELING AFRAID AS IF SOMETHING AWFUL MIGHT HAPPEN: NEARLY EVERY DAY
1. FEELING NERVOUS, ANXIOUS, OR ON EDGE: MORE THAN HALF THE DAYS
5. BEING SO RESTLESS THAT IT IS HARD TO SIT STILL: NEARLY EVERY DAY
3. WORRYING TOO MUCH ABOUT DIFFERENT THINGS: NEARLY EVERY DAY
7. FEELING AFRAID AS IF SOMETHING AWFUL MIGHT HAPPEN: NEARLY EVERY DAY
6. BECOMING EASILY ANNOYED OR IRRITABLE: SEVERAL DAYS
GAD7 TOTAL SCORE: 18
4. TROUBLE RELAXING: NEARLY EVERY DAY
GAD7 TOTAL SCORE: 18
GAD7 TOTAL SCORE: 18
IF YOU CHECKED OFF ANY PROBLEMS ON THIS QUESTIONNAIRE, HOW DIFFICULT HAVE THESE PROBLEMS MADE IT FOR YOU TO DO YOUR WORK, TAKE CARE OF THINGS AT HOME, OR GET ALONG WITH OTHER PEOPLE: SOMEWHAT DIFFICULT

## 2023-03-31 NOTE — PROGRESS NOTES
[facility] - Progress Note    Date of Service: 3/31/23   Name: Kd Slater  : 1995  Medical Record Number: [id number]  Treating Provider: Tenisha Stewart PsyD  Type of Session: Individual  Present in Session: Client  Session Start and Stop Time: 10:30-11:25  Number of Minutes:  55    SERVICE MODALITY:  Video Visit:      Provider verified identity through the following two step process.  Patient provided:  Patient was verified at admission/transfer    Telemedicine Visit: The patient's condition can be safely assessed and treated via synchronous audio and visual telemedicine encounter.      Reason for Telemedicine Visit: Patient convenience (e.g. access to timely appointments / distance to available provider)    Originating Site (Patient Location): Patient's home    Distant Site (Provider Location): [facility]    Consent:  The patient/guardian has verbally consented to: the potential risks and benefits of telemedicine (video visit) versus in person care; bill my insurance or make self-payment for services provided; and responsibility for payment of non-covered services.     Patient would like the video invitation sent by:  My Chart    Mode of Communication:  Video Conference via [email or web address]    Distant Location (Provider):  Off-site    As the provider I attest to compliance with applicable laws and regulations related to telemedicine.    DSM-5 Diagnoses:  Encounter Diagnoses   Name Primary?     Adjustment disorder with depressed mood Yes     Gender dysphoria in adult      Current Reported Symptoms and Status update:  Changes since last session- anxiety, paranoia, depressed mood, challenges with toxic relationships, dysphoria.    Progress Toward Treatment Goals:   Satisfactory progress     Therapeutic Interventions/Treatment Strategies:    Area(s) of treatment focus addressed in this session included [place] Maintenance/Relapse Prevention,  Interpersonal Relationship Skills and Gender Health  Psychotherapist offered support, feedback and validation, provided redirection and reinforced use of skills Treatment modalities used include Systemic Relational Therapy Gender Affirming Care Interpersonal Humanist Coping Skills: Addressed barriers to utilizing coping skills when in distress, Other: Explored with patient how life transitions may impact mental health and functioning , Relationship Skills: Encouraged development and maintenance  of healthy boundaries and Discussed strategies to promote healthier understanding of interpersonal relationships and explored challenging unrealistic expectations of self/others  Support, Feedback, Problem Solving and Clarification    Patient Response:   Patient responded to session by verbalizing understanding and actively engaged  Possible barriers to participation / learning include: N/A    Current Mental Status Exam:   Appearance:  Appropriate   Eye Contact:  Good   Attitude / Demeanor: Cooperative   Speech      Rate / Production: Normal/ Responsive      Volume:  Normal  volume  Orientation:  All  Mood:   Normal  Affect:   Labile   Thought Content: Clear   Insight:   Good       Plan/Need for Future Services:  Return for therapy in 1-2 weeks to treat diagnosed problems.    Patient has a current master individualized treatment plan.  See Epic treatment plan for more information.    Referral / Collaboration:  Referral to another professional/service is not indicated at this time..  Emergency Services Needed?  No    Assignment:  None    Interactive Complexity:  There are four specific communication difficulties that complicate the work of the primary psychiatric procedure.  Interactive complexity (+79652) may be reported when at least one of these difficulties is present.    Communication difficulties present during current the psychiatric procedure include:  1. None.      Signature/Title:    Tenisha Stewart PsyD       Answers  for HPI/ROS submitted by the patient on 3/31/2023  If you checked off any problems, how difficult have these problems made it for you to do your work, take care of things at home, or get along with other people?: Somewhat difficult  PHQ9 TOTAL SCORE: 8  MICHELLE 7 TOTAL SCORE: 18

## 2023-03-31 NOTE — NURSING NOTE
Is the patient currently in the state of [place]? YES    Visit mode:VIDEO    If the visit is dropped, the patient can be reconnected by: VIDEO VISIT: Text to cell phone: [phone]    Will anyone else be joining the visit? NO      How would you like to obtain your AVS? MyChart    Are changes needed to the allergy or medication list? NO    Reason for visit: Follow up

## 2023-04-02 ENCOUNTER — HEALTH MAINTENANCE LETTER (OUTPATIENT)
Age: 28
End: 2023-04-02

## 2023-04-28 ENCOUNTER — VIRTUAL VISIT (OUTPATIENT)
Dept: PSYCHOLOGY | Facility: CLINIC | Age: 28
End: 2023-04-28
Payer: COMMERCIAL

## 2023-04-28 DIAGNOSIS — F64.0 GENDER DYSPHORIA IN ADULT: Primary | ICD-10-CM

## 2023-04-28 DIAGNOSIS — F43.21 ADJUSTMENT DISORDER WITH DEPRESSED MOOD: ICD-10-CM

## 2023-04-28 PROCEDURE — 90837 PSYTX W PT 60 MINUTES: CPT | Mod: VID | Performed by: MARRIAGE & FAMILY THERAPIST

## 2023-04-28 NOTE — PROGRESS NOTES
[place] [facility] - Progress Note    Date of Service: 23   Name: Kd Slater  : 1995  Medical Record Number: [id number]  Treating Provider: Tenisha Stewart  Type of Session: Individual  Present in Session: Client  Session Start and Stop Time: 10:30-11:25  Number of Minutes:  55    SERVICE MODALITY:  Video Visit:      Provider verified identity through the following two step process.  Patient provided:  Patient was verified at admission/transfer    Telemedicine Visit: The patient's condition can be safely assessed and treated via synchronous audio and visual telemedicine encounter.      Reason for Telemedicine Visit: Patient convenience (e.g. access to timely appointments / distance to available provider)    Originating Site (Patient Location): Patient's home    Distant Site (Provider Location): [facility]    Consent:  The patient/guardian has verbally consented to: the potential risks and benefits of telemedicine (video visit) versus in person care; bill my insurance or make self-payment for services provided; and responsibility for payment of non-covered services.     Patient would like the video invitation sent by:  My Chart    Mode of Communication:  Video Conference via [facility]    Distant Location (Provider):  On-site    As the provider I attest to compliance with applicable laws and regulations related to telemedicine.    DSM-5 Diagnoses:  Encounter Diagnoses   Name Primary?     Adjustment disorder with depressed mood      Gender dysphoria in adult Yes       Current Reported Symptoms and Status update:  Changes since last session- client endorsed ups and downs related to gender dysphoria, mood, and paranoia. Client reports depressed mood recently, increased rumination and paranoid thoughts.    Progress Toward Treatment Goals:   Satisfactory progress     Therapeutic Interventions/Treatment Strategies:    Area(s) of treatment focus addressed  in this session included Interpersonal Relationship Skills and Gender Health  Psychotherapist offered support, feedback and validation and reinforced use of skills Treatment modalities used include Gender Affirming Care Interpersonal Symptoms Management: Explored clients moods and perceptions related to vagal state.  and Other: Discussed experiences of dysphoria.  Support and Education    Patient Response:   Patient responded to session by verbalizing understanding and actively engaged  Possible barriers to participation / learning include: N/A    Current Mental Status Exam:   Appearance:  Appropriate   Eye Contact:  Good   Attitude / Demeanor: Cooperative  Friendly  Speech      Rate / Production: Normal/ Responsive      Volume:  Normal  volume  Orientation:  All  Mood:   Normal  Affect:   Appropriate   Thought Content: Clear   Insight:   Good       Plan/Need for Future Services:  Return for therapy in 2 weeks to treat diagnosed problems.    Patient has a current master individualized treatment plan.  See Epic treatment plan for more information.    Referral / Collaboration:  Referral to another professional/service is not indicated at this time..  Emergency Services Needed?  No    Assignment:  None    Interactive Complexity:  There are four specific communication difficulties that complicate the work of the primary psychiatric procedure.  Interactive complexity (+01497) may be reported when at least one of these difficulties is present.    Communication difficulties present during current the psychiatric procedure include:  1. None.      Signature/Title:    Tenisha Stewart

## 2023-04-28 NOTE — NURSING NOTE
Is the patient currently in the state of [place]? YES    Visit mode:VIDEO    If the visit is dropped, the patient can be reconnected by: VIDEO VISIT: Text to cell phone:   Telephone Information:   Mobile [phone]       Will anyone else be joining the visit? No  (If patient encounters technical issues they should call [phone])    How would you like to obtain your AVS? MyChart    Are changes needed to the allergy or medication list? N/A    Rooming Documentation: Questionnaire(s) not done per department protocol    Reason for visit: Video Visit     ANGELINA Benson

## 2023-06-16 ENCOUNTER — VIRTUAL VISIT (OUTPATIENT)
Dept: PSYCHOLOGY | Facility: CLINIC | Age: 28
End: 2023-06-16
Payer: COMMERCIAL

## 2023-06-16 DIAGNOSIS — F64.0 GENDER DYSPHORIA IN ADULT: Primary | ICD-10-CM

## 2023-06-16 PROCEDURE — 90837 PSYTX W PT 60 MINUTES: CPT | Mod: VID | Performed by: MARRIAGE & FAMILY THERAPIST

## 2023-06-16 NOTE — PROGRESS NOTES
[place] [facility] - Progress Note    Date of Service: 23   Name: Kd Slater  : 1995  Medical Record Number: [id number]  Treating Provider: Tenisha Stewart PsyD  Type of Session: Individual  Present in Session: Client   Session Start and Stop Time: 10:02-11:00  Number of Minutes: 58    SERVICE MODALITY:  Video Visit:      Provider verified identity through the following two step process.  Patient provided:  Patient was verified at admission/transfer    Telemedicine Visit: The patient's condition can be safely assessed and treated via synchronous audio and visual telemedicine encounter.      Reason for Telemedicine Visit: Patient convenience (e.g. access to timely appointments / distance to available provider)    Originating Site (Patient Location): Patient's home    Distant Site (Provider Location): Provider Remote Setting- Home Office    Consent:  The patient/guardian has verbally consented to: the potential risks and benefits of telemedicine (video visit) versus in person care; bill my insurance or make self-payment for services provided; and responsibility for payment of non-covered services.     Patient would like the video invitation sent by:  My Chart    Mode of Communication:  Video Conference via [facility]    Distant Location (Provider):  On-site    As the provider I attest to compliance with applicable laws and regulations related to telemedicine.    DSM-5 Diagnoses:  Encounter Diagnosis   Name Primary?     Gender dysphoria in adult Yes     Current Reported Symptoms and Status update:  Changes since last session: decreased depression and anxiety. Increased mood, motivation, feeling confident. Embracing transition, playing tennis, work and relationships going well.     Progress Toward Treatment Goals:   Satisfactory progress     Therapeutic Interventions/Treatment Strategies:    Area(s) of treatment focus addressed in this session included Interpersonal Relationship Skills,  Gender Health and Physical Health     Psychotherapist offered support, feedback and validation Treatment modalities used include Gender Affirming Care Interpersonal Symptoms Management: Promoted understanding of their diagnoses and how it impacts their functioning and discussed gender literacy Explored gender affirming surgery processes.   Support, Redirection and Education    Patient Response:   Patient responded to session by verbalizing understanding and actively engaged  Possible barriers to participation / learning include: N/A    Current Mental Status Exam:   Appearance:  Appropriate   Eye Contact:  Good   Attitude / Demeanor: Cooperative   Speech      Rate / Production: Normal/ Responsive      Volume:  Normal  volume  Orientation:  All  Mood:   Normal  Affect:   Appropriate   Thought Content: Clear   Insight:   Good       Plan/Need for Future Services:  Return for therapy in 2 weeks to treat diagnosed problems.    Patient has a current master individualized treatment plan.  See Epic treatment plan for more information.    Referral / Collaboration:  Referral to another professional/service is not indicated at this time..  Emergency Services Needed?  No    Assignment:  None    Interactive Complexity:  There are four specific communication difficulties that complicate the work of the primary psychiatric procedure.  Interactive complexity (+95643) may be reported when at least one of these difficulties is present.    Communication difficulties present during current the psychiatric procedure include:  1. None.      Signature/Title:    Tenisha Stewart PsyD

## 2023-06-16 NOTE — NURSING NOTE
Is the patient currently in the state of [place]? YES    Visit mode:VIDEO    If the visit is dropped, the patient can be reconnected by: VIDEO VISIT:  Send e-mail to at [email or web address]    Will anyone else be joining the visit? No  (If patient encounters technical issues they should call [phone])    How would you like to obtain your AVS? MyChart    Are changes needed to the allergy or medication list? N/A    Rooming Documentation:     Reason for visit: ANGELINA Marcelino

## 2023-07-07 ENCOUNTER — VIRTUAL VISIT (OUTPATIENT)
Dept: PSYCHOLOGY | Facility: CLINIC | Age: 28
End: 2023-07-07
Payer: COMMERCIAL

## 2023-07-07 DIAGNOSIS — F43.21 ADJUSTMENT DISORDER WITH DEPRESSED MOOD: ICD-10-CM

## 2023-07-07 DIAGNOSIS — F64.0 GENDER DYSPHORIA IN ADULT: Primary | ICD-10-CM

## 2023-07-07 PROCEDURE — 90837 PSYTX W PT 60 MINUTES: CPT | Mod: VID | Performed by: MARRIAGE & FAMILY THERAPIST

## 2023-07-07 NOTE — PROGRESS NOTES
[place] [facility] - Progress Note    Date of Service: 23   Name: Kd Slater  : 1995  Medical Record Number: [id number]  Treating Provider: Tenisha Stewart PsyD  Type of Session: Individual  Present in Session: Client  Session Start and Stop Time: 10:05-11:00  Number of Minutes: 55    SERVICE MODALITY:  Video Visit:      Provider verified identity through the following two step process.  Patient provided:  Patient was verified at admission/transfer    Telemedicine Visit: The patient's condition can be safely assessed and treated via synchronous audio and visual telemedicine encounter.      Reason for Telemedicine Visit: Patient convenience (e.g. access to timely appointments / distance to available provider)    Originating Site (Patient Location): Patient's home    Distant Site (Provider Location): [facility]    Consent:  The patient/guardian has verbally consented to: the potential risks and benefits of telemedicine (video visit) versus in person care; bill my insurance or make self-payment for services provided; and responsibility for payment of non-covered services.     Patient would like the video invitation sent by:  My Chart    Mode of Communication:  Video Conference via [facility]    Distant Location (Provider):  On-site    As the provider I attest to compliance with applicable laws and regulations related to telemedicine.    DSM-5 Diagnoses:  Encounter Diagnoses   Name Primary?     Gender dysphoria in adult Yes     Adjustment disorder with depressed mood      Current Reported Symptoms and Status update:  Changes since last session:     Continued discomfort with social dysphoria, managing people perceptions, managing trans misogyny.   Progress Toward Treatment Goals:   Satisfactory progress     Therapeutic Interventions/Treatment Strategies:    Area(s) of treatment focus addressed in this session included Interpersonal Relationship  Skills, Gender Health and Community Resources    Psychotherapist offered support, feedback and validation and reinforced use of skills Treatment modalities used include Gender Affirming Care Feminist Informed Interpersonal Other: Explored with patient how life transitions may impact mental health and functioning  and explored gender and societal roles as it relates to dysphoria and experience. and Relationship Skills: Encouraged development and maintenance  of healthy boundaries  Support, Redirection and Feedback    Patient Response:   Patient responded to session by verbalizing understanding and actively engaged  Possible barriers to participation / learning include: system oppression    Current Mental Status Exam:   Appearance:  Appropriate   Eye Contact:  Good   Attitude / Demeanor: Cooperative   Speech      Rate / Production: Normal/ Responsive      Volume:  Normal  volume  Orientation:  All  Mood:   Normal  Affect:   Appropriate   Thought Content: Clear   Insight:   Good       Plan/Need for Future Services:  Return for therapy in 2 weeks to treat diagnosed problems.    Patient has a current master individualized treatment plan.  See Epic treatment plan for more information.    Referral / Collaboration:  Referral to another professional/service is not indicated at this time..  Emergency Services Needed?  No    Assignment:  None    Interactive Complexity:  There are four specific communication difficulties that complicate the work of the primary psychiatric procedure.  Interactive complexity (+51726) may be reported when at least one of these difficulties is present.    Communication difficulties present during current the psychiatric procedure include:  1. None.      Signature/Title:    Tenisha Stewart PsyD

## 2023-07-07 NOTE — NURSING NOTE
Is the patient currently in the state of [place]? YES - at home.    Visit mode:VIDEO    If the visit is dropped, the patient can be reconnected by: VIDEO VISIT:  Send e-mail to at [email or web address]    Will anyone else be joining the visit? No  (If patient encounters technical issues they should call [phone])    How would you like to obtain your AVS? MyChart    Are changes needed to the allergy or medication list? N/A    Rooming Documentation: Questionnaire(s) not done per department protocol.    Reason for visit: DIEGO Hobbs, MIKEF

## 2023-07-21 ENCOUNTER — VIRTUAL VISIT (OUTPATIENT)
Dept: PSYCHOLOGY | Facility: CLINIC | Age: 28
End: 2023-07-21
Payer: COMMERCIAL

## 2023-07-21 DIAGNOSIS — F64.0 GENDER DYSPHORIA IN ADULT: Primary | ICD-10-CM

## 2023-07-21 DIAGNOSIS — F43.21 ADJUSTMENT DISORDER WITH DEPRESSED MOOD: ICD-10-CM

## 2023-07-21 PROCEDURE — 90834 PSYTX W PT 45 MINUTES: CPT | Mod: VID | Performed by: MARRIAGE & FAMILY THERAPIST

## 2023-07-21 NOTE — PROGRESS NOTES
[place] [facility] - Progress Note    Date of Service: 23   Name: Kd Slater  : 1995  Medical Record Number: [id number]  Treating Provider: Tenisha Stewart PsyD  Type of Session: Individual  Present in Session: Client  Session Start and Stop Time: 10:12-11  Number of Minutes:  48    SERVICE MODALITY:  Video Visit:      Provider verified identity through the following two step process.  Patient provided:  Patient was verified at admission/transfer    Telemedicine Visit: The patient's condition can be safely assessed and treated via synchronous audio and visual telemedicine encounter.      Reason for Telemedicine Visit: Patient convenience (e.g. access to timely appointments / distance to available provider)    Originating Site (Patient Location): Patient's home    Distant Site (Provider Location): [facility]    Consent:  The patient/guardian has verbally consented to: the potential risks and benefits of telemedicine (video visit) versus in person care; bill my insurance or make self-payment for services provided; and responsibility for payment of non-covered services.     Patient would like the video invitation sent by:  My Chart    Mode of Communication:  Video Conference via [facility]    Distant Location (Provider):  On-site    As the provider I attest to compliance with applicable laws and regulations related to telemedicine.    DSM-5 Diagnoses:  Encounter Diagnoses   Name Primary?     Gender dysphoria in adult Yes     Adjustment disorder with depressed mood      Current Reported Symptoms and Status update:  Changes since last session- client endorsed continued dysphoria, struggling to address trans misogyny, understanding self-concept.    Progress Toward Treatment Goals:   Satisfactory progress     Therapeutic Interventions/Treatment Strategies:      Terrified of being perceived as a woman. -fear of adelaida/being happy, relationship to self (self  love/self hatred)/self judgment.    Area(s) of treatment focus addressed in this session included Symptom Management, Interpersonal Relationship Skills and Gender Health    Psychotherapist offered support, feedback and validation and reinforced use of skills Treatment modalities used include Gender Affirming Care Interpersonal Humanist Symptoms Management: Promoted understanding of their diagnoses and how it impacts their functioning, Other: Assisted patient  in finding ways to adapt functioning in light of past traumatic experiences and Explored with patient how life transitions may impact mental health and functioning  and discussed gender literacy  Support, Redirection, Feedback and Clarification    Patient Response:   Patient responded to session by verbalizing understanding and actively engaged  Possible barriers to participation / learning include: N/A    Current Mental Status Exam:   Appearance:  Appropriate   Eye Contact:  Good   Attitude / Demeanor: Cooperative   Speech      Rate / Production: Normal/ Responsive      Volume:  Normal  volume  Orientation:  All  Mood:   Sad   Affect:   Labile   Thought Content: Clear   Insight:   Good       Plan/Need for Future Services:  Return for therapy in 2 weeks to treat diagnosed problems.    Patient has a current master individualized treatment plan.  See Epic treatment plan for more information.    Referral / Collaboration:  The following referral(s) will be initiated: [facility] and BIPOC groups.  Emergency Services Needed?  No    Assignment:  None    Interactive Complexity:  There are four specific communication difficulties that complicate the work of the primary psychiatric procedure.  Interactive complexity (+61809) may be reported when at least one of these difficulties is present.    Communication difficulties present during current the psychiatric procedure include:  1. None.      Signature/Title:    Tenisha Stewart PsyD

## 2023-07-24 ENCOUNTER — VIRTUAL VISIT (OUTPATIENT)
Dept: PSYCHOLOGY | Facility: CLINIC | Age: 28
End: 2023-07-24
Payer: COMMERCIAL

## 2023-07-24 DIAGNOSIS — F64.0 GENDER DYSPHORIA IN ADULT: ICD-10-CM

## 2023-07-24 PROCEDURE — 90834 PSYTX W PT 45 MINUTES: CPT | Mod: VID | Performed by: PSYCHOLOGIST

## 2023-07-24 ASSESSMENT — COLUMBIA-SUICIDE SEVERITY RATING SCALE - C-SSRS
2. HAVE YOU ACTUALLY HAD ANY THOUGHTS OF KILLING YOURSELF?: NO
6. HAVE YOU EVER DONE ANYTHING, STARTED TO DO ANYTHING, OR PREPARED TO DO ANYTHING TO END YOUR LIFE?: NO
TOTAL  NUMBER OF ABORTED OR SELF INTERRUPTED ATTEMPTS LIFETIME: NO
1. HAVE YOU WISHED YOU WERE DEAD OR WISHED YOU COULD GO TO SLEEP AND NOT WAKE UP?: YES
1. IN THE PAST MONTH, HAVE YOU WISHED YOU WERE DEAD OR WISHED YOU COULD GO TO SLEEP AND NOT WAKE UP?: YES
TOTAL  NUMBER OF INTERRUPTED ATTEMPTS LIFETIME: NO
ATTEMPT LIFETIME: NO

## 2023-07-24 ASSESSMENT — PATIENT HEALTH QUESTIONNAIRE - PHQ9
SUM OF ALL RESPONSES TO PHQ QUESTIONS 1-9: 11
SUM OF ALL RESPONSES TO PHQ QUESTIONS 1-9: 11
10. IF YOU CHECKED OFF ANY PROBLEMS, HOW DIFFICULT HAVE THESE PROBLEMS MADE IT FOR YOU TO DO YOUR WORK, TAKE CARE OF THINGS AT HOME, OR GET ALONG WITH OTHER PEOPLE: SOMEWHAT DIFFICULT
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
## 2023-07-24 NOTE — PROGRESS NOTES
"                                           Luis Alberto BLEVINS [facility]    SAFETY PLAN:  Step 1: Warning signs / cues (Thoughts, images, mood, situation, behavior) that a crisis may be developing:  Thoughts: \"I don't matter\"; lack of self worth   Images: flashbacks and obsessive thoughts  Thinking Processes: paranoia: fears that people are attempting to hurt me in some way and surveilling me.    Mood: worsening depression and intense worry  Behaviors: isolating/withdrawing  and not taking care of my responsibilities  Situations:  negative social situation with stranger or feeling like losing touch with self      Step 2: Coping strategies - Things I can do to take my mind off of my problems without contacting another person (relaxation technique, physical activity):  Distress Tolerance Strategies:   listen to music, make art, be with a pet  Physical Activities: go for a walk and meditation  Focus on helpful thoughts:  \"This is temporary\" and \"I will get through this\"    Step 3: People and social settings that provide distraction:   Name: Ariela    Name: mom    Name: Addis    [employer] or [employer]      Step 4: Remind myself of people and things that are important to me and worth living for:  music, close relationships, movies, books, hobbies and interests      Step 5: When I am in crisis, I can ask these people to help me use my safety plan:   Name: Ariela    Name: mom    Name: Addis     Step 6: Making the environment safe:   be around others and firearms are not accessible    Step 7: Professionals or agencies I can contact during a crisis:  [facility] Daytime Number: [phone]  Suicide Prevention Lifeline: 0-450-295-JQFR (4730)  Crisis Text Line Service (available 24 hours a day, 7 days a week): Text [place] to 433393    Call 911 or go to my nearest emergency department.   I helped develop this safety plan and agree to use it when needed.  I have been given a copy of this plan.      Today s date:  July 24, " 2023  Adapted from Safety Plan Template 2008 Ophelia Lopez and Jeuss Baron is reprinted with the express permission of the authors.  No portion of the Safety Plan Template may be reproduced without the express, written permission.  You can contact the authors at [email or web address] or [email or web address].    Adelaida Milian PsyD, MARILYN  Clinical Psychologist

## 2023-07-24 NOTE — PATIENT INSTRUCTIONS
"SAFETY PLAN:  Step 1: Warning signs / cues (Thoughts, images, mood, situation, behavior) that a crisis may be developing:  Thoughts: \"I don't matter\"; lack of self worth   Images: flashbacks and obsessive thoughts  Thinking Processes: paranoia: fears that people are attempting to hurt me in some way and surveilling me.   Mood: worsening depression and intense worry  Behaviors: isolating/withdrawing  and not taking care of my responsibilities  Situations: negative social situation with stranger or feeling like losing touch with self     Step 2: Coping strategies - Things I can do to take my mind off of my problems without contacting another person (relaxation technique, physical activity):  Distress Tolerance Strategies:  listen to music, make art, be with a pet  Physical Activities: go for a walk and meditation  Focus on helpful thoughts:  \"This is temporary\" and \"I will get through this\"    Step 3: People and social settings that provide distraction:   Name: Ariela    Name: mom    Name: Addis    [employer] or the [employer]     Step 4: Remind myself of people and things that are important to me and worth living for:  music, close relationships, movies, books, hobbies and interests      Step 5: When I am in crisis, I can ask these people to help me use my safety plan:   Name: Ariela    Name: mom    Name: Addis     Step 6: Making the environment safe:   be around others and firearms are not accessible    Step 7: Professionals or agencies I can contact during a crisis:  [facility] Daytime Number: [phone]  Suicide Prevention Lifeline: 1-933-265-TALK (7452)  Crisis Text Line Service (available 24 hours a day, 7 days a week): Text [place] to 460532    Call 911 or go to my nearest emergency department.   I helped develop this safety plan and agree to use it when needed.  I have been given a copy of this plan.      Today s date:  July 24, 2023  Adapted from Safety Plan Template 2008 Ophelia CORONEL" Tod is reprinted with the express permission of the authors.  No portion of the Safety Plan Template may be reproduced without the express, written permission.  You can contact the authors at [email or web address] or [email or web address].

## 2023-08-03 ENCOUNTER — VIRTUAL VISIT (OUTPATIENT)
Dept: PSYCHOLOGY | Facility: CLINIC | Age: 28
End: 2023-08-03
Payer: COMMERCIAL

## 2023-08-03 DIAGNOSIS — F64.0 GENDER DYSPHORIA IN ADULT: Primary | ICD-10-CM

## 2023-08-03 PROCEDURE — 90791 PSYCH DIAGNOSTIC EVALUATION: CPT | Mod: 95 | Performed by: PSYCHOLOGIST

## 2023-08-03 ASSESSMENT — ANXIETY QUESTIONNAIRES
6. BECOMING EASILY ANNOYED OR IRRITABLE: SEVERAL DAYS
GAD7 TOTAL SCORE: 7
4. TROUBLE RELAXING: SEVERAL DAYS
2. NOT BEING ABLE TO STOP OR CONTROL WORRYING: SEVERAL DAYS
GAD7 TOTAL SCORE: 7
5. BEING SO RESTLESS THAT IT IS HARD TO SIT STILL: SEVERAL DAYS
IF YOU CHECKED OFF ANY PROBLEMS ON THIS QUESTIONNAIRE, HOW DIFFICULT HAVE THESE PROBLEMS MADE IT FOR YOU TO DO YOUR WORK, TAKE CARE OF THINGS AT HOME, OR GET ALONG WITH OTHER PEOPLE: SOMEWHAT DIFFICULT
3. WORRYING TOO MUCH ABOUT DIFFERENT THINGS: SEVERAL DAYS
1. FEELING NERVOUS, ANXIOUS, OR ON EDGE: MORE THAN HALF THE DAYS
7. FEELING AFRAID AS IF SOMETHING AWFUL MIGHT HAPPEN: NOT AT ALL
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
## 2023-08-04 ENCOUNTER — VIRTUAL VISIT (OUTPATIENT)
Dept: PSYCHOLOGY | Facility: CLINIC | Age: 28
End: 2023-08-04
Payer: COMMERCIAL

## 2023-08-04 DIAGNOSIS — F64.0 GENDER DYSPHORIA IN ADULT: Primary | ICD-10-CM

## 2023-08-04 DIAGNOSIS — F43.21 ADJUSTMENT DISORDER WITH DEPRESSED MOOD: ICD-10-CM

## 2023-08-04 PROCEDURE — 90837 PSYTX W PT 60 MINUTES: CPT | Mod: VID | Performed by: MARRIAGE & FAMILY THERAPIST

## 2023-08-04 NOTE — NURSING NOTE
Is the patient currently in the state of [place]? YES    Visit mode:VIDEO    If the visit is dropped, the patient can be reconnected by: VIDEO VISIT: Send to e-mail at: [email or web address]    Will anyone else be joining the visit? NO      How would you like to obtain your AVS? MyChart    Are changes needed to the allergy or medication list? NO    Reason for visit: RECHECK

## 2023-08-04 NOTE — PROGRESS NOTES
[facility] - Progress Note    Date of Service: 23   Name: Kd Slater  : 1995  Medical Record Number: [id number]  Treating Provider: Tenisha Stewart PsyD  Type of Session: Individual  Present in Session: Client  Session Start and Stop Time: 9:03-9:58  Number of Minutes: 55    SERVICE MODALITY:  Video Visit:      Provider verified identity through the following two step process.  Patient provided:  Patient was verified at admission/transfer    Telemedicine Visit: The patient's condition can be safely assessed and treated via synchronous audio and visual telemedicine encounter.      Reason for Telemedicine Visit: Patient convenience (e.g. access to timely appointments / distance to available provider)    Originating Site (Patient Location): Patient's home    Distant Site (Provider Location): Provider Remote Setting- Home Office    Consent:  The patient/guardian has verbally consented to: the potential risks and benefits of telemedicine (video visit) versus in person care; bill my insurance or make self-payment for services provided; and responsibility for payment of non-covered services.     Patient would like the video invitation sent by:  My Chart    Mode of Communication:  Video Conference via Amwell    Distant Location (Provider):  Off-site    As the provider I attest to compliance with applicable laws and regulations related to telemedicine.    DSM-5 Diagnoses:  Encounter Diagnoses   Name Primary?     Gender dysphoria in adult Yes     Adjustment disorder with depressed mood      Current Reported Symptoms and Status update:  Changes since last session-decreased dysphoria, anxiety related to gender expectations and socialization.     Progress Toward Treatment Goals:   Satisfactory progress     Therapeutic Interventions/Treatment Strategies:    Area(s) of treatment focus addressed in this session included Gender Health and Maintenance of Progress    Psychotherapist  offered support, feedback and validation and explored role of patriarchy and societal perceptions around race, gender and sexual orientation. Treatment modalities used include Feminist Informed Interpersonal Cognitive Restructuring:  deconstructed perceptions of gender and privlege, objectificaiton.   Support, Redirection and Feedback    Patient Response:   Patient responded to session by verbalizing understanding and actively engaged  Possible barriers to participation / learning include: system oppression    Current Mental Status Exam:   Appearance:  Appropriate   Eye Contact:  Good   Attitude / Demeanor: Cooperative   Speech      Rate / Production: Normal/ Responsive      Volume:  Normal  volume  Orientation:  All  Mood:   Normal  Affect:   Appropriate   Thought Content: Clear   Insight:   Good       Plan/Need for Future Services:  Return for therapy in 2 weeks to treat diagnosed problems.    Patient has a current master individualized treatment plan.  See Epic treatment plan for more information.    Referral / Collaboration:  Referral to another professional/service is not indicated at this time..  Emergency Services Needed?  No    Assignment:  None    Interactive Complexity:  There are four specific communication difficulties that complicate the work of the primary psychiatric procedure.  Interactive complexity (+[id number]) may be reported when at least one of these difficulties is present.    Communication difficulties present during current the psychiatric procedure include:  1. None.      Signature/Title:    Tenisha Stewart PsyD

## 2023-08-11 ENCOUNTER — VIRTUAL VISIT (OUTPATIENT)
Dept: PSYCHOLOGY | Facility: CLINIC | Age: 28
End: 2023-08-11
Payer: COMMERCIAL

## 2023-08-11 DIAGNOSIS — F43.21 ADJUSTMENT DISORDER WITH DEPRESSED MOOD: ICD-10-CM

## 2023-08-11 DIAGNOSIS — F64.0 GENDER DYSPHORIA IN ADULT: Primary | ICD-10-CM

## 2023-08-11 PROCEDURE — 90837 PSYTX W PT 60 MINUTES: CPT | Mod: VID | Performed by: MARRIAGE & FAMILY THERAPIST

## 2023-08-11 NOTE — NURSING NOTE
Is the patient currently in the state of [place]? YES    Visit mode:VIDEO    If the visit is dropped, the patient can be reconnected by: VIDEO VISIT: Text to cell phone:   Telephone Information:   Mobile [phone]       Will anyone else be joining the visit? No  (If patient encounters technical issues they should call [phone])    How would you like to obtain your AVS? MyChart    Are changes needed to the allergy or medication list? N/A    Rooming Documentation: Questionnaire(s) not done per department protocol.    Reason for visit: ANGELINA Gilmore

## 2023-08-11 NOTE — PROGRESS NOTES
[facility] - Progress Note    Date of Service: 23   Name: Kd Slater  : 1995  Medical Record Number: [id number]  Treating Provider: Tenisha Stewart PsyD   Type of Session: Individual  Present in Session: Client  Session Start and Stop Time: 10:07-11  Number of Minutes: 53    SERVICE MODALITY:  Video Visit:      Provider verified identity through the following two step process.  Patient provided:  Patient was verified at admission/transfer    Telemedicine Visit: The patient's condition can be safely assessed and treated via synchronous audio and visual telemedicine encounter.      Reason for Telemedicine Visit: Patient convenience (e.g. access to timely appointments / distance to available provider)    Originating Site (Patient Location): Patient's home    Distant Site (Provider Location): [facility]    Consent:  The patient/guardian has verbally consented to: the potential risks and benefits of telemedicine (video visit) versus in person care; bill my insurance or make self-payment for services provided; and responsibility for payment of non-covered services.     Patient would like the video invitation sent by:  My Chart    Mode of Communication:  Video Conference via [facility]    Distant Location (Provider):  On-site    As the provider I attest to compliance with applicable laws and regulations related to telemedicine.    DSM-5 Diagnoses:  Encounter Diagnoses   Name Primary?     Gender dysphoria in adult Yes     Adjustment disorder with depressed mood          Current Reported Symptoms and Status update:  Changes since last session- client endorsed continued dysphoria related to work discrimination and ongoing microaggressions.     Progress Toward Treatment Goals:   Satisfactory progress     Therapeutic Interventions/Treatment Strategies:    Area(s) of treatment focus addressed in this session included Symptom Management and Gender  Health    Psychotherapist offered support, feedback and validation and reinforced use of skills Treatment modalities used include Gender Affirming Care Interpersonal Other: Explored with patient how life transitions may impact mental health and functioning  and Discussed ways to increase hopefulness  Support, Redirection and Clarification    Patient Response:   Patient responded to session by accepting feedback  Possible barriers to participation / learning include: N/A    Current Mental Status Exam:   Appearance:  Appropriate   Eye Contact:  Good   Attitude / Demeanor: Cooperative   Speech      Rate / Production: Normal/ Responsive      Volume:  Normal  volume  Orientation:  All  Mood:   Normal  Affect:   Appropriate   Thought Content: Clear   Insight:   Good       Plan/Need for Future Services:  Return for therapy in 2 weeks to treat diagnosed problems.    Patient has a current master individualized treatment plan.  See Epic treatment plan for more information.    Referral / Collaboration:  Referral to another professional/service is not indicated at this time..  Emergency Services Needed?  No    Assignment:  Start Transfemme group 8/14    Interactive Complexity:  There are four specific communication difficulties that complicate the work of the primary psychiatric procedure.  Interactive complexity (+[id number]) may be reported when at least one of these difficulties is present.    Communication difficulties present during current the psychiatric procedure include:  1. None.      Signature/Title:    Tenisha Stewart PsyD

## 2023-08-14 ENCOUNTER — APPOINTMENT (OUTPATIENT)
Dept: PSYCHOLOGY | Facility: CLINIC | Age: 28
End: 2023-08-14
Payer: COMMERCIAL

## 2023-08-23 ENCOUNTER — VIRTUAL VISIT (OUTPATIENT)
Dept: PSYCHOLOGY | Facility: CLINIC | Age: 28
End: 2023-08-23
Payer: COMMERCIAL

## 2023-08-23 DIAGNOSIS — F33.3 MAJOR DEPRESSIVE DISORDER, RECURRENT, SEVERE WITH PSYCHOTIC FEATURES (H): Primary | ICD-10-CM

## 2023-08-23 DIAGNOSIS — F41.1 GENERALIZED ANXIETY DISORDER: ICD-10-CM

## 2023-08-23 DIAGNOSIS — F64.0 GENDER DYSPHORIA IN ADULT: ICD-10-CM

## 2023-08-23 PROCEDURE — 96130 PSYCL TST EVAL PHYS/QHP 1ST: CPT | Mod: GT | Performed by: PSYCHOLOGIST

## 2023-08-23 PROCEDURE — 96131 PSYCL TST EVAL PHYS/QHP EA: CPT | Mod: GT | Performed by: PSYCHOLOGIST

## 2023-08-23 PROCEDURE — 99207 PR NO CHARGE LOS: CPT | Mod: VID | Performed by: PSYCHOLOGIST

## 2023-08-23 NOTE — Clinical Note
Hello,  Just wanted to let you know that I have completed the general psychological testing with this patient.  Ultimately, I diagnosed depression with psychotic features and generalized anxiety disorder.  Please let me know if you have any questions or concerns!  Thanks!  Adelaida

## 2023-08-23 NOTE — PROGRESS NOTES
[facility]   Mental Health & Addiction Services     Progress Note - General Psychological Feedback Session     Patient Name: Luis Alberto Slater  Date: 2023       Service Type:  Individual       Session Start Time: 5:00pm  Session End Time:  5:26pm     Session Length: 26 minutes    Session #: 3    Attendees: Patient attended alone    Service Modality: Video Visit:      Provider verified identity through the following two step process.  Patient provided:  Patient  and Patient address    Telemedicine Visit: The patient's condition can be safely assessed and treated via synchronous audio and visual telemedicine encounter.      Reason for Telemedicine Visit: Services only offered telehealth    Originating Site (Patient Location): Patient's home    Distant Site (Provider Location): Provider Remote Setting- Home Office    Consent:  The patient/guardian has verbally consented to: the potential risks and benefits of telemedicine (video visit) versus in person care; bill my insurance or make self-payment for services provided; and responsibility for payment of non-covered services.     Patient would like the video invitation sent by:  Send to e-mail at: [email or web address]    Mode of Communication:  Video Conference via [facility]    Distant Location (Provider):  Off-site    As the provider I attest to compliance with applicable laws and regulations related to telemedicine.          2021     9:25 AM 3/31/2023    10:25 AM 2023    10:33 AM   PHQ   PHQ-9 Total Score 7 8 11   Q9: Thoughts of better off dead/self-harm past 2 weeks Not at all Several days Several days   F/U: Thoughts of suicide or self-harm  Yes Yes   F/U: Self harm-plan  No No   F/U: Self-harm action  No No   F/U: Safety concerns  No No           2021     9:25 AM 3/31/2023    10:27 AM 8/3/2023     4:36 PM   MICHELLE-7 SCORE   Total Score  18 (severe anxiety) 7 (mild  anxiety)   Total Score 14 18    18 7         DATA      Progress Since Last Session (Related to Symptoms / Goals / Homework):   Symptoms: Stable.    Homework: Completed.      Treatment Objective(s) Addressed in This Session:   Provided feedback on the general psychological evaluation. Reviewed test results in depth. Plan of care and recommendations were discussed based on testing data. See full report attached on secondary note in this encounter.     Intervention:   Provided feedback to patient regarding testing results, diagnoses, and treatment recommendations. Test results are consistent with major depression with psychotic features as well as generalized anxiety disorder.. Personalized suggestions regarding symptoms were offered. Patient had the opportunity to ask questions; she expressed understanding.        ASSESSMENT: Current Emotional / Mental Status (status of significant symptoms):   Risk status (Self / Other harm or suicidal ideation)   Patient denies current fears or concerns for personal safety.   Patient denies current or recent suicidal ideation or behaviors.   Patient denies current or recent homicidal ideation or behaviors.   Patient denies current or recent self injurious behavior or ideation.   Patient denies other safety concerns.   Patient reports there has been no change in risk factors since their last session.     Patient reports there has been no change in protective factors since their last session.     A safety and risk management plan has been developed including: Safety plan developed on 7/24/2023 and sent to the patient during the AVS of that encounter.     Appearance:   Appropriate    Eye Contact:   Good    Psychomotor Behavior: Normal    Attitude:   Cooperative    Orientation:   All   Speech    Rate / Production: Normal     Volume:  Normal    Mood:    Normal   Affect:    Appropriate    Thought Content:  Clear    Thought Form:  Coherent  Logical    Insight:    Good      Medication  Review:   No current psychiatric medications prescribed     Medication Compliance:   NA     Changes in Health Issues:   None reported     Chemical Use Review:   Substance Use: See report.      Nicotine Use: No current tobacco use.      Diagnosis:  1. Generalized anxiety disorder    2. Major depressive disorder, recurrent, severe with psychotic features (H)        PLAN:   Recommendations are outlined in full evaluation report (attached to this encounter).   Patient indicated understanding and will contact the clinic if there are further questions.    Report routed to referring provider.    Parts of this documentation may have been completed using dictation software. Potential errors may result and are unintentional.       Adelaida Milian PsyD, LP  Clinical Psychologist         Psychological Testing Services Summary       Testing Evaluation Services Base: 76640  (first 60 mins) Add-on: [id number]  (each addtl 60 mins)   Record Review and Clarify Referral Question   7:30am-7:40am on 7/24/2023 10 minutes   Clinical Decision Making/Battery Modification   11:30am-11:45am on 8/3/2023 15 minutes   Integration/Report Generation   1:00pm-1:45pm on 8/21/2023 (MMPI-3)  1:45pm-2:30pm on 8/21/2023 (Final Report)   45 minutes  45 minutes   Interactive Feedback Session   5:00pm-5:26pm on 8/23/2023 26 minutes   Post-Service Work   5:26pm-5:41pm on 8/23/2023 15 minutes   Total Time: 156 minutes   Total Units: 1 2       Diagnoses:   F33.3 Major Depressive Disorder, recurrent episode, severe with psychotic features  F41.1 Generalized Anxiety Disorder
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
## 2023-08-25 ENCOUNTER — VIRTUAL VISIT (OUTPATIENT)
Dept: PSYCHOLOGY | Facility: CLINIC | Age: 28
End: 2023-08-25
Payer: COMMERCIAL

## 2023-08-25 DIAGNOSIS — F64.0 GENDER DYSPHORIA IN ADULT: Primary | ICD-10-CM

## 2023-08-25 DIAGNOSIS — F33.3 MAJOR DEPRESSIVE DISORDER, RECURRENT, SEVERE WITH PSYCHOTIC FEATURES (H): ICD-10-CM

## 2023-08-25 PROCEDURE — 90837 PSYTX W PT 60 MINUTES: CPT | Mod: VID | Performed by: MARRIAGE & FAMILY THERAPIST

## 2023-08-25 NOTE — PROGRESS NOTES
[facility] - Progress Note    Date of Service: 23   Name: Kd Slater  : 1995  Medical Record Number: [id number]  Treating Provider: Tenisha Stewart PsyD   Type of Session: Individual  Present in Session: Client  Session Start and Stop Time: 9:03-9:57  Number of Minutes: 54    SERVICE MODALITY:  Video Visit:      Provider verified identity through the following two step process.  Patient provided:  Patient was verified at admission/transfer    Telemedicine Visit: The patient's condition can be safely assessed and treated via synchronous audio and visual telemedicine encounter.      Reason for Telemedicine Visit: Patient convenience (e.g. access to timely appointments / distance to available provider)    Originating Site (Patient Location): Patient's home    Distant Site (Provider Location): [facility]    Consent:  The patient/guardian has verbally consented to: the potential risks and benefits of telemedicine (video visit) versus in person care; bill my insurance or make self-payment for services provided; and responsibility for payment of non-covered services.     Patient would like the video invitation sent by:  My Chart    Mode of Communication:  Video Conference via [employer]well    Distant Location (Provider):  On-site    As the provider I attest to compliance with applicable laws and regulations related to telemedicine.    DSM-5 Diagnoses:  Encounter Diagnoses   Name Primary?     Major depressive disorder, recurrent, severe with psychotic features (H)      Gender dysphoria in adult Yes     Current Reported Symptoms and Status update:  Changes since last session- client began support group, managing gender dysphoria well, continued ups and down with mood, self-perception, paranoia and interpersonal relationships.     Progress Toward Treatment Goals:   Satisfactory progress     Therapeutic Interventions/Treatment Strategies:    Area(s)  of treatment focus addressed in this session included Interpersonal Relationship Skills and Gender Health    Psychotherapist offered support, feedback and validation, provided redirection and reinforced use of skills Treatment modalities used include Gender Affirming Care Interpersonal Cognitive Restructuring:  Explored impact of ineffective thoughts / distortions on mood and activity, Symptoms Management: Promoted understanding of their diagnoses and how it impacts their functioning and Other: Assisted patient  in finding ways to adapt functioning in light of past traumatic experiences and Explored with patient how life transitions may impact mental health and functioning   Support, Redirection, Feedback and Clarification    Patient Response:   Patient responded to session by verbalizing understanding and actively engaged  Possible barriers to participation / learning include: N/A    Current Mental Status Exam:   Appearance:  Appropriate   Eye Contact:  Good   Attitude / Demeanor: Cooperative   Speech      Rate / Production: Normal/ Responsive      Volume:  Normal  volume  Orientation:  All  Mood:   Normal  Affect:   Appropriate   Thought Content: Clear   Insight:   Good       Plan/Need for Future Services:  Return for therapy in 2 weeks to treat diagnosed problems.    Patient has a current master individualized treatment plan.  See Epic treatment plan for more information.    Referral / Collaboration:  Referral to another professional/service is not indicated at this time..  Emergency Services Needed?  No    Assignment:  None    Interactive Complexity:  There are four specific communication difficulties that complicate the work of the primary psychiatric procedure.  Interactive complexity (+30004) may be reported when at least one of these difficulties is present.    Communication difficulties present during current the psychiatric procedure include:  1. None.      Signature/Title:    Tenisha Stewart PsyD

## 2023-08-25 NOTE — NURSING NOTE
Is the patient currently in the state of [place]? YES    Visit mode:VIDEO    If the visit is dropped, the patient can be reconnected by: VIDEO VISIT:  Send e-mail to at [email or web address]    Will anyone else be joining the visit? No  (If patient encounters technical issues they should call [phone])    How would you like to obtain your AVS? MyChart    Are changes needed to the allergy or medication list? N/A    Rooming Documentation: Questionnaire(s) not done per department protocol.    Reason for visit: ANGELINA Marcelino

## 2023-08-28 ENCOUNTER — TELEPHONE (OUTPATIENT)
Dept: OTOLARYNGOLOGY | Facility: CLINIC | Age: 28
End: 2023-08-28
Payer: COMMERCIAL

## 2023-08-28 ENCOUNTER — OFFICE VISIT (OUTPATIENT)
Dept: PSYCHOLOGY | Facility: CLINIC | Age: 28
End: 2023-08-28
Payer: COMMERCIAL

## 2023-08-28 DIAGNOSIS — F64.0 GENDER DYSPHORIA IN ADULT: Primary | ICD-10-CM

## 2023-08-28 PROCEDURE — 90853 GROUP PSYCHOTHERAPY: CPT | Performed by: PSYCHOLOGIST

## 2023-08-29 NOTE — TELEPHONE ENCOUNTER
FUTURE VISIT INFORMATION      FUTURE VISIT INFORMATION:  Date: 12/12/23  Time: 11:00am  [place] [facility] [place]  REFERRAL INFORMATION:  Reason for visit/diagnosis  Gender Care    RECORDS REQUESTED FROM:       No recs to collect

## 2023-09-08 ENCOUNTER — VIRTUAL VISIT (OUTPATIENT)
Dept: PSYCHOLOGY | Facility: CLINIC | Age: 28
End: 2023-09-08
Payer: COMMERCIAL

## 2023-09-08 DIAGNOSIS — F64.0 GENDER DYSPHORIA IN ADULT: Primary | ICD-10-CM

## 2023-09-08 DIAGNOSIS — F41.1 GENERALIZED ANXIETY DISORDER: ICD-10-CM

## 2023-09-08 PROCEDURE — 90837 PSYTX W PT 60 MINUTES: CPT | Mod: VID | Performed by: MARRIAGE & FAMILY THERAPIST

## 2023-09-08 NOTE — NURSING NOTE
Is the patient currently in the state of [place]? YES    Visit mode:VIDEO    If the visit is dropped, the patient can be reconnected by: VIDEO VISIT: Send to e-mail at: [email or web address]    Will anyone else be joining the visit? NO  (If patient encounters technical issues they should call [phone]    How would you like to obtain your AVS? MyChart    Are changes needed to the allergy or medication list? N/A    Reason for visit: RECHNAPOLEON ALFARO

## 2023-09-08 NOTE — PROGRESS NOTES
[place] [facility] - Progress Note    Date of Service: 23   Name: Kd Slater  : 1995  Medical Record Number: [id number]  Treating Provider: Tenisha Stewart PsyD  Type of Session: Individual  Present in Session: Client  Session Start and Stop Time: :55  Number of Minutes: 55    SERVICE MODALITY:  Video Visit:      Provider verified identity through the following two step process.  Patient provided:  Patient was verified at admission/transfer    Telemedicine Visit: The patient's condition can be safely assessed and treated via synchronous audio and visual telemedicine encounter.      Reason for Telemedicine Visit: Patient convenience (e.g. access to timely appointments / distance to available provider)    Originating Site (Patient Location): Patient's home    Distant Site (Provider Location): [facility]    Consent:  The patient/guardian has verbally consented to: the potential risks and benefits of telemedicine (video visit) versus in person care; bill my insurance or make self-payment for services provided; and responsibility for payment of non-covered services.     Patient would like the video invitation sent by:  My Chart    Mode of Communication:  Video Conference via [facility]    Distant Location (Provider):  On-site    As the provider I attest to compliance with applicable laws and regulations related to telemedicine.    DSM-5 Diagnoses:  Encounter Diagnoses   Name Primary?     Gender dysphoria in adult Yes     Generalized anxiety disorder      Current Reported Symptoms and Status update:  Changes since last session positive shift in mood, social anxiety, lessened gender dysphoria.    Progress Toward Treatment Goals:   Satisfactory progress     Therapeutic Interventions/Treatment Strategies:    Area(s) of treatment focus addressed in this session included Interpersonal Relationship Skills and Gender Health    Psychotherapist offered  support, feedback and validation Treatment modalities used include Gender Affirming Care Interpersonal Other: Explored with patient how life transitions may impact mental health and functioning  and Relationship Skills: explored ways to assert needs/boundaries with group.  Support, Redirection, Feedback and Problem Solving    Patient Response:   Patient responded to session by verbalizing understanding and actively engaged  Possible barriers to participation / learning include: N/A    Current Mental Status Exam:   Appearance:  Appropriate   Eye Contact:  Good   Attitude / Demeanor: Cooperative   Speech      Rate / Production: Normal/ Responsive      Volume:  Normal  volume  Orientation:  All  Mood:   Normal  Affect:   Appropriate   Thought Content: Clear   Insight:   Good       Plan/Need for Future Services:  Return for therapy in 2 weeks to treat diagnosed problems.    Patient has a current master individualized treatment plan.  See Epic treatment plan for more information.    Referral / Collaboration:  Referral to another professional/service is not indicated at this time..  Emergency Services Needed?  No    Assignment:  None    Interactive Complexity:  There are four specific communication difficulties that complicate the work of the primary psychiatric procedure.  Interactive complexity (+98008) may be reported when at least one of these difficulties is present.    Communication difficulties present during current the psychiatric procedure include:  1. None.      Signature/Title:    Tenisha Stewart PsyD

## 2023-09-10 NOTE — PROGRESS NOTES
[place] [facility] - Progress Note    Date of Service: 23   Name: Kd Reyes)  : 1995  Medical Record Number: [id number]  Treating Provider: Candi Mera, PhD   Type of Session: group  Present in Session: Client  Type of Session: Group  Number of Minutes: 120 mins with group members present  Therapist(s): Candi Mera, PhD, LP     Video start time: 6:30 PM  Video end time: 8:30 PM     SERVICE MODALITY:  In Person      DSM-5 Diagnoses:  F64.0 - Gender Dysphoria in Adolescent and Adult   296.33 Recurrent Major depression      Current Reported Symptoms and Status update:  Experiences gender dysphoria;  ups and down with mood and self-perception, experiences paranoia and struggles in interpersonal relationships.      Changes since last session - attending first group     Progress Toward Treatment Goals:   Adequate -- followed through with attending group     Therapeutic Interventions/Treatment Strategies:     Area(s) of treatment focus addressed in this session included Symptom Management, Interpersonal Relationship Skills, Gender Health and Wellness       Cognitive behavioral, interpersonal, and group therapy interventions were utilized to explore gender dysphoria (i.e., transition, interpersonal relationships, and medical interventions) and range of mental health symptoms First participated in a discussion of group guidelines and introduced self as a new group member.  Shared her sense of loneliness and fear living as a trans women.  Shared that it was important to her to meet other trans people in order to learn from them and share experiences.      Treatment modalities used include [facility] THERAPY INTERVENTIONS: Motivational Interviewing Group Dynamic Therapy  Behavioral Activation: Explored how behaviors effect mood and interact with thoughts and feelings;  Promoting gender health and resiliency.      Patient Response:   Patient responded to session by listening, accepting  support, verbalizing understanding and actively engaged.      Possible barriers to participation / learning include: none noted     Current Mental Status Exam:   Appearance:  Appropriate   Eye Contact:  Good   Attitude / Demeanor: Cooperative   Speech      Rate / Production: Normal/ Responsive      Volume:  Normal  volume  Orientation:  All  Mood:   Normal  Affect:   Appropriate   Thought Content: Clear   Insight:   Good           Plan/Need for Future Services:  Return for group therapy in 2 weeks to treat diagnosed problems.    Patient has a current master individualized treatment plan.  See Epic treatment plan for more information.     Referral / Collaboration:  Referral to another professional/service is not indicated at this time.  Emergency Services Needed?  No     Assignment:  none     Interactive Complexity:  There are four specific communication difficulties that complicate the work of the primary psychiatric procedure.  Interactive complexity (+10924) may be reported when at least one of these difficulties is present.     Communication difficulties present during current the psychiatric procedure include:  None.        Signature/Title:     Candi Mera LP

## 2023-09-11 ENCOUNTER — OFFICE VISIT (OUTPATIENT)
Dept: PSYCHOLOGY | Facility: CLINIC | Age: 28
End: 2023-09-11
Payer: COMMERCIAL

## 2023-09-11 DIAGNOSIS — F33.3 MAJOR DEPRESSIVE DISORDER, RECURRENT, SEVERE WITH PSYCHOTIC FEATURES (H): ICD-10-CM

## 2023-09-11 DIAGNOSIS — F64.0 GENDER DYSPHORIA IN ADULT: Primary | ICD-10-CM

## 2023-09-11 PROCEDURE — 90853 GROUP PSYCHOTHERAPY: CPT | Performed by: PSYCHOLOGIST

## 2023-09-11 NOTE — PROGRESS NOTES
"  [facility] - Progress Note    Date of Service: 23   Name: Kd Reyes)  : 1995  Medical Record Number: [id number]  Treating Provider: Candi Mera, PhD   Type of Session: group  Present in Session: Client  Type of Session: Group  Number of Minutes: 120 mins with group members present  Therapist(s): Candi Mera, PhD, LP     Video start time: 6:30 PM  Video end time: 8:30 PM     SERVICE MODALITY:  In Person      DSM-5 Diagnoses:  F64.0 - Gender Dysphoria in Adolescent and Adult   296.33 Recurrent Major depression      Current Reported Symptoms and Status update:  Experiences gender dysphoria;  ups and down with mood and self-perception, experiences paranoia and struggles in interpersonal relationships.      Changes since last session - attending first group     Progress Toward Treatment Goals:   Adequate -- followed through with attending group     Therapeutic Interventions/Treatment Strategies:     Area(s) of treatment focus addressed in this session included Symptom Management, Interpersonal Relationship Skills, Gender Health and Wellness       Cognitive behavioral, interpersonal, and group therapy interventions were utilized to explore gender dysphoria (i.e., transition, interpersonal relationships, and medical interventions) and range of mental health symptoms.  Dav opened up to the group about how she has to \"always be on\" and is working on not continuing this pattern in the here and now of the group.  Also shared that \"my brain tells me shit\" but due to time constraints did not get further to further elaborate.  Was okay with going sooner in the upcoming group.    Treatment modalities used include [facility] THERAPY INTERVENTIONS: Motivational Interviewing Group Dynamic Therapy  Behavioral Activation: Explored how behaviors effect mood and interact with thoughts and feelings;  Promoting gender health and resiliency.      Patient Response:   Patient responded to " session by listening, accepting support, verbalizing understanding and actively engaged.      Possible barriers to participation / learning include: none noted     Current Mental Status Exam:   Appearance:  Appropriate   Eye Contact:  Good   Attitude / Demeanor: Cooperative   Speech      Rate / Production: Normal/ Responsive      Volume:  Normal  volume  Orientation:  All  Mood:   Positive  Affect:   Appropriate   Thought Content: Clear   Insight:   Good           Plan/Need for Future Services:  Return for group therapy in 2 weeks to treat diagnosed problems.    Patient has a current master individualized treatment plan.  See Epic treatment plan for more information.     Referral / Collaboration:  Referral to another professional/service is not indicated at this time.  Emergency Services Needed?  No     Assignment:  none     Interactive Complexity:  There are four specific communication difficulties that complicate the work of the primary psychiatric procedure.  Interactive complexity (+23850) may be reported when at least one of these difficulties is present.     Communication difficulties present during current the psychiatric procedure include:  None.        Signature/Title:     Candi Mera LP

## 2023-09-22 ENCOUNTER — VIRTUAL VISIT (OUTPATIENT)
Dept: PSYCHOLOGY | Facility: CLINIC | Age: 28
End: 2023-09-22
Payer: COMMERCIAL

## 2023-09-22 DIAGNOSIS — F64.0 GENDER DYSPHORIA IN ADULT: Primary | ICD-10-CM

## 2023-09-22 PROCEDURE — 90837 PSYTX W PT 60 MINUTES: CPT | Mod: VID | Performed by: MARRIAGE & FAMILY THERAPIST

## 2023-09-22 NOTE — NURSING NOTE
Is the patient currently in the state of [place]? YES    Visit mode:VIDEO    If the visit is dropped, the patient can be reconnected by: VIDEO VISIT: Text to cell phone:   Telephone Information:   Mobile [phone]       Will anyone else be joining the visit? No  (If patient encounters technical issues they should call [phone])    How would you like to obtain your AVS? MyChart    Are changes needed to the allergy or medication list? N/A    Rooming Documentation: Questionnaire(s) not done per department protocol.    Reason for visit: ANGELINA Gilmore

## 2023-09-22 NOTE — PROGRESS NOTES
[place] [facility] - Progress Note    Date of Service: 23   Name: Kd Slater  : 1995  Medical Record Number: [id number]  Treating Provider: Tenisha Stewart PsyD   Type of Session: Individual  Present in Session: Client  Session Start and Stop Time: 9:05-9:58  Number of Minutes: 53    SERVICE MODALITY:  Video Visit:      Provider verified identity through the following two step process.  Patient provided:  Patient was verified at admission/transfer    Telemedicine Visit: The patient's condition can be safely assessed and treated via synchronous audio and visual telemedicine encounter.      Reason for Telemedicine Visit: Patient convenience (e.g. access to timely appointments / distance to available provider)    Originating Site (Patient Location): Patient's home    Distant Site (Provider Location): Provider Remote Setting- Home Office    Consent:  The patient/guardian has verbally consented to: the potential risks and benefits of telemedicine (video visit) versus in person care; bill my insurance or make self-payment for services provided; and responsibility for payment of non-covered services.     Patient would like the video invitation sent by:  My Chart    Mode of Communication:  Video Conference via [facility]    Distant Location (Provider):  Off-site    As the provider I attest to compliance with applicable laws and regulations related to telemedicine.    DSM-5 Diagnoses:  Encounter Diagnosis   Name Primary?     Gender dysphoria in adult Yes     Current Reported Symptoms and Status update:  Changes since last session decreased dysphoria, continued paranoid/perseverative thinking, perceptions of self and other based on various identities.     Continued navigating of interpersonal stress with fellow group member.    Progress Toward Treatment Goals:   Satisfactory progress     Therapeutic Interventions/Treatment Strategies:    Area(s) of treatment focus addressed in this session  included [place] Maintenance/Relapse Prevention, Interpersonal Relationship Skills and Gender Health    Psychotherapist offered support, feedback and validation and reinforced use of skills Treatment modalities used include Sex Therapy Interpersonal Relationship Skills: Assisted patients in implementing more effective communication skills in their relationships and Encouraged development and maintenance  of healthy boundaries and discussed application of self compassion  Support, Redirection, Feedback and Limit/Boundaries    Patient Response:   Patient responded to session by verbalizing understanding and actively engaged  Possible barriers to participation / learning include: N/A    Current Mental Status Exam:   Appearance:  Appropriate   Eye Contact:  Good   Attitude / Demeanor: Cooperative   Speech      Rate / Production: Normal/ Responsive      Volume:  Normal  volume  Orientation:  All  Mood:   Normal  Affect:   Appropriate   Thought Content: Clear   Insight:   Good       Plan/Need for Future Services:  Return for therapy in 2 weeks to treat diagnosed problems.    Patient has a current master individualized treatment plan.  See Epic treatment plan for more information.    Referral / Collaboration:  Referral to another professional/service is not indicated at this time..  Emergency Services Needed?  No    Assignment:  None    Interactive Complexity:  There are four specific communication difficulties that complicate the work of the primary psychiatric procedure.  Interactive complexity (+74718) may be reported when at least one of these difficulties is present.    Communication difficulties present during current the psychiatric procedure include:  1. None.      Signature/Title:    Tenisha Stewart PsyD

## 2023-09-25 ENCOUNTER — OFFICE VISIT (OUTPATIENT)
Dept: PSYCHOLOGY | Facility: CLINIC | Age: 28
End: 2023-09-25
Payer: COMMERCIAL

## 2023-09-25 DIAGNOSIS — F33.3 MAJOR DEPRESSIVE DISORDER, RECURRENT, SEVERE WITH PSYCHOTIC FEATURES (H): ICD-10-CM

## 2023-09-25 DIAGNOSIS — F64.0 GENDER DYSPHORIA IN ADULT: Primary | ICD-10-CM

## 2023-09-25 PROCEDURE — 90853 GROUP PSYCHOTHERAPY: CPT | Mod: HN | Performed by: PSYCHOLOGIST

## 2023-09-26 NOTE — PROGRESS NOTES
"I was present for the entire group therapy session and actively participated in the session.  Candi Mera, Ph.D.   Licensed Psychologist         [facility] - Progress Note    Date of Service: 23   Name: Kd Reyes)  : 1995  Medical Record Number: [id number]  Treating Provider: Candi Mera, PhD   Type of Session: group  Present in Session: Client  Type of Session: Group  Number of Minutes: 120 mins with group members present  Therapist(s): Candi Mera, PhD,  and Ernestine Sanders, Doctoral Psychology Intern (PsyD)     Video start time: 6:30 PM  Video end time: 8:30 PM     SERVICE MODALITY:  In Person      DSM-5 Diagnoses:  F64.0 - Gender Dysphoria in Adolescent and Adult   296.33 Recurrent Major depression      Current Reported Symptoms and Status update:  Experiences gender dysphoria;  ups and down with mood over the past two weeks and self-perception, experiences paranoia around strangers and struggles in interpersonal relationships.      Changes since last session - Decline in mood over the past two weeks, associates it with shifts between gender dysphoria and euphoria.     Progress Toward Treatment Goals:   Adequate -- followed through with attending group     Therapeutic Interventions/Treatment Strategies:     Area(s) of treatment focus addressed in this session included Symptom Management, Interpersonal Relationship Skills, Gender Health and Wellness       Cognitive behavioral, interpersonal, and group therapy interventions were utilized to explore gender dysphoria (i.e., transition, interpersonal relationships, and medical interventions) and range of mental health symptoms.  Dav discussed her all-or-nothing thinking patterns (\"I will always think this way; being trans really sucks or being trans is joyful/euphoric\").  She was open to cognitive reframing strategies by the group to find gray areas.  She also gained insight into how her " depression and psychotic-related symptoms can become heightened by social/system stressors of being transgender, such as interacting with misogyny in her work.    Treatment modalities used include [facility] THERAPY INTERVENTIONS: Motivational Interviewing Group Dynamic Therapy  Behavioral Activation: Explored how behaviors effect mood and interact with thoughts and feelings;  Promoting gender health and resiliency.      Patient Response:   Patient responded to session by listening, accepting support, verbalizing understanding and actively engaged.      Possible barriers to participation / learning include: Depression and psychosis-related symptoms; System oppression     Current Mental Status Exam:   Appearance:  Appropriate   Eye Contact:  Good   Attitude / Demeanor: Cooperative   Speech      Rate / Production: Normal/ Responsive      Volume:  Normal  volume  Orientation:  All  Mood:   Sad  Dysphoric  Affect:   Appropriate   Thought Content: Clear   Insight:   Good           Plan/Need for Future Services:  Return for group therapy in 2 weeks to treat diagnosed problems.    Patient has a current master individualized treatment plan.  See Epic treatment plan for more information.     Referral / Collaboration:  Referral to another professional/service is not indicated at this time.  Emergency Services Needed?  No     Assignment:  none     Interactive Complexity:  There are four specific communication difficulties that complicate the work of the primary psychiatric procedure.  Interactive complexity (+62345) may be reported when at least one of these difficulties is present.     Communication difficulties present during current the psychiatric procedure include:  None.        Signature/Title:     Ernestine Sanders, Doctoral Psychology Intern (PsyD)

## 2023-10-06 ENCOUNTER — VIRTUAL VISIT (OUTPATIENT)
Dept: PSYCHOLOGY | Facility: CLINIC | Age: 28
End: 2023-10-06
Payer: COMMERCIAL

## 2023-10-06 DIAGNOSIS — F43.21 ADJUSTMENT DISORDER WITH DEPRESSED MOOD: ICD-10-CM

## 2023-10-06 DIAGNOSIS — F64.0 GENDER DYSPHORIA IN ADULT: Primary | ICD-10-CM

## 2023-10-06 PROCEDURE — 90837 PSYTX W PT 60 MINUTES: CPT | Mod: VID | Performed by: MARRIAGE & FAMILY THERAPIST

## 2023-10-06 NOTE — NURSING NOTE
Is the patient currently in the state of [place]? YES    Visit mode:VIDEO    If the visit is dropped, the patient can be reconnected by: VIDEO VISIT: Text to cell phone:   Telephone Information:   Mobile [phone]       Will anyone else be joining the visit? NO  (If patient encounters technical issues they should call [phone]    How would you like to obtain your AVS? MyChart    Are changes needed to the allergy or medication list? N/A    Reason for visit: DIEGO ALFARO

## 2023-10-06 NOTE — PROGRESS NOTES
[place] [facility] - Progress Note    Date of Service: 10/06/23   Name: Dav Slater   : 1995  Medical Record Number: [id number]  Treating Provider: Tenisha Stewart   Type of Session: Individual  Present in Session: Client  Session Start and Stop Time: :53  Number of Minutes: 53    SERVICE MODALITY:  Video Visit:      Provider verified identity through the following two step process.  Patient provided:  Patient was verified at admission/transfer    Telemedicine Visit: The patient's condition can be safely assessed and treated via synchronous audio and visual telemedicine encounter.      Reason for Telemedicine Visit: Patient convenience (e.g. access to timely appointments / distance to available provider)    Originating Site (Patient Location): Patient's home    Distant Site (Provider Location): [facility]    Consent:  The patient/guardian has verbally consented to: the potential risks and benefits of telemedicine (video visit) versus in person care; bill my insurance or make self-payment for services provided; and responsibility for payment of non-covered services.     Patient would like the video invitation sent by:  My Chart    Mode of Communication:  Video Conference via [facility]    Distant Location (Provider):  On-site    As the provider I attest to compliance with applicable laws and regulations related to telemedicine.    DSM-5 Diagnoses:  Encounter Diagnoses   Name Primary?     Gender dysphoria in adult Yes     Adjustment disorder with depressed mood        Current Reported Symptoms and Status update:  Changes since last session- improved mood, improved sleep. Progress in navigating appearance, social perceptions.     Progress Toward Treatment Goals:   Satisfactory progress     Therapeutic Interventions/Treatment Strategies:    Area(s) of treatment focus addressed in this session included Gender Health and Physical Health      Psychotherapist offered support, feedback and validation and reinforced use of skills Treatment modalities used include Gender Affirming Care Interpersonal Other: explored narratives around femininity, appearnce, and perceptions.  Support and Feedback    Patient Response:   Patient responded to session by verbalizing understanding and actively engaged  Possible barriers to participation / learning include: N/A    Current Mental Status Exam:   Appearance:  Appropriate   Eye Contact:  Good   Attitude / Demeanor: Cooperative   Speech      Rate / Production: Normal/ Responsive      Volume:  Normal  volume  Orientation:  All  Mood:   Normal  Affect:   Appropriate   Thought Content: Clear   Insight:   Good       Plan/Need for Future Services:  Return for therapy in 2 weeks to treat diagnosed problems.    Patient has a current master individualized treatment plan.  See Epic treatment plan for more information.    Referral / Collaboration:  Referral to another professional/service is not indicated at this time..  Emergency Services Needed?  No    Assignment:  None    Interactive Complexity:  There are four specific communication difficulties that complicate the work of the primary psychiatric procedure.  Interactive complexity (+90812) may be reported when at least one of these difficulties is present.    Communication difficulties present during current the psychiatric procedure include:  1. None.      Signature/Title:    Tenisha Stewart

## 2023-10-09 ENCOUNTER — OFFICE VISIT (OUTPATIENT)
Dept: PSYCHOLOGY | Facility: CLINIC | Age: 28
End: 2023-10-09
Payer: COMMERCIAL

## 2023-10-09 DIAGNOSIS — F33.3 MAJOR DEPRESSIVE DISORDER, RECURRENT, SEVERE WITH PSYCHOTIC FEATURES (H): ICD-10-CM

## 2023-10-09 DIAGNOSIS — F64.0 GENDER DYSPHORIA IN ADULT: Primary | ICD-10-CM

## 2023-10-09 PROCEDURE — 90853 GROUP PSYCHOTHERAPY: CPT | Performed by: PSYCHOLOGIST

## 2023-10-09 NOTE — PROGRESS NOTES
"  [facility]    Date of Service: 10/09/23   Name: Kd Slater (Dav)  : 1995  Medical Record Number: [id number]  Treating Provider: Candi Mera, PhD   Type of Session: group  Present in Session: Client  Type of Session: Group  Number of Minutes: 120 mins with group members present  Therapist(s): Candi Mera, PhD, LP     Video start time: 6:30 PM  Video end time: 8:30 PM     SERVICE MODALITY:  In Person      DSM-5 Diagnoses:  F64.0 - Gender Dysphoria in Adolescent and Adult   296.33 Recurrent Major depression      Current Reported Symptoms and Status update:  Experiences gender dysphoria;  ups and down with mood and self-perception, experiences paranoia and struggles in interpersonal relationships.      Changes since last session - feeling much better--\"in my body;\"  working hard to not allow self-loathing to swallow me whole.      Progress Toward Treatment Goals:   Satisfactory      Therapeutic Interventions/Treatment Strategies:     Area(s) of treatment focus addressed in this session included Symptom Management, Interpersonal Relationship Skills, Gender Health and Wellness       Cognitive behavioral, interpersonal, and group therapy interventions were utilized to explore gender dysphoria (i.e., transition, interpersonal relationships, and medical interventions) and range of mental health symptoms.  Dav did not take own time in group but responded to others from her own experience.  Also asked questions of others in a way that validated their experience and helped her to relate better.     Treatment modalities used include [facility] THERAPY INTERVENTIONS: Motivational Interviewing Group Dynamic Therapy  Behavioral Activation: Explored how behaviors effect mood and interact with thoughts and feelings;  Promoting gender health and resiliency.      Patient Response:   Patient responded to session by listening, accepting support, verbalizing understanding and " actively engaged.      Possible barriers to participation / learning include: none noted     Current Mental Status Exam:   Appearance:  Appropriate   Eye Contact:  Good   Attitude / Demeanor: Cooperative   Speech      Rate / Production: Normal/ Responsive      Volume:  Normal  volume  Orientation:  All  Mood:   Positive  Affect:   Appropriate   Thought Content: Clear   Insight:   Good           Plan/Need for Future Services:  Return for group therapy in 2 weeks to treat diagnosed problems.    Patient has a current master individualized treatment plan.  See Epic treatment plan for more information.     Referral / Collaboration:  Referral to another professional/service is not indicated at this time.  Emergency Services Needed?  No     Assignment:  none     Interactive Complexity:  There are four specific communication difficulties that complicate the work of the primary psychiatric procedure.  Interactive complexity (+35952) may be reported when at least one of these difficulties is present.     Communication difficulties present during current the psychiatric procedure include:  None.        Signature/Title:     Candi Mera LP

## 2023-10-23 ENCOUNTER — OFFICE VISIT (OUTPATIENT)
Dept: PSYCHOLOGY | Facility: CLINIC | Age: 28
End: 2023-10-23
Payer: COMMERCIAL

## 2023-10-23 DIAGNOSIS — F33.3 MAJOR DEPRESSIVE DISORDER, RECURRENT, SEVERE WITH PSYCHOTIC FEATURES (H): ICD-10-CM

## 2023-10-23 DIAGNOSIS — F64.0 GENDER DYSPHORIA IN ADULT: Primary | ICD-10-CM

## 2023-10-23 PROCEDURE — 90853 GROUP PSYCHOTHERAPY: CPT | Mod: HN | Performed by: PSYCHOLOGIST

## 2023-10-24 NOTE — PROGRESS NOTES
"  [facility] Progress [facility]    Date of Service: 10/23/23   Name: Kd Reyes)  : 1995  Medical Record Number: [id number]  Treating Provider: Candi Mera, PhD   Type of Session: group  Present in Session: Client  Type of Session: Group  Number of Minutes: 120 mins with group members present  Therapist(s): Candi Mera, PhD,  and Ernestine Sanders, Doctoral Psychology Intern (PsyD)      Video start time: 6:30 PM  Video end time: 8:30 PM     SERVICE MODALITY:  In Person      DSM-5 Diagnoses:  F64.0 - Gender Dysphoria in Adolescent and Adult   296.33 Recurrent Major depression      Current Reported Symptoms and Status update:  Experiences gender dysphoria;  ups and down with mood and self-perception, experiences paranoia and struggles in interpersonal relationships.      Changes since last session - Reported experiencing more \"self-sabotage around gender.\"    Progress Toward Treatment Goals:   Satisfactory      Therapeutic Interventions/Treatment Strategies:     Area(s) of treatment focus addressed in this session included Symptom Management, Interpersonal Relationship Skills, Gender Health and Wellness       Cognitive behavioral, interpersonal, and group therapy interventions were utilized to explore gender dysphoria (i.e., transition, interpersonal relationships, and medical interventions) and range of mental health symptoms.  Dav recognized feeling \"weirdly anxious\" to take time during group.  Confronted anxiety in the group by discussing her self-sabotage around gender.  Specifically, how different parts of her personality show up when feeling unsafe in the community, leading to her wearing clothing that does not align with how she wants to present herself.  Recognized her pattern of shutting down, ruminating on thoughts, which creates more paranoia.  She was receptive to others' feedback on their experiences of transphobia and/or acceptance in the " community.    Psychotherapist and group members offered support, feedback and validation.     Treatment modalities used include [facility] THERAPY INTERVENTIONS: Motivational Interviewing Group Dynamic Therapy  Behavioral Activation: Explored how behaviors effect mood and interact with thoughts and feelings;  Promoting gender health and resiliency.      Patient Response:   Patient responded to session by listening, accepting support, verbalizing understanding and actively engaged.      Possible barriers to participation / learning include: none noted     Current Mental Status Exam:   Appearance:  Appropriate   Eye Contact:  Good   Attitude / Demeanor: Cooperative   Speech      Rate / Production: Normal/ Responsive      Volume:  Normal  volume  Orientation:  All  Mood:   Positive  Affect:   Appropriate   Thought Content: Clear   Insight:   Good           Plan/Need for Future Services:  Return for group therapy in 2 weeks to treat diagnosed problems.    Patient has a current master individualized treatment plan.  See Epic treatment plan for more information.     Referral / Collaboration:  Referral to another professional/service is not indicated at this time.  Emergency Services Needed?  No     Assignment:  none     Interactive Complexity:  There are four specific communication difficulties that complicate the work of the primary psychiatric procedure.  Interactive complexity (+98240) may be reported when at least one of these difficulties is present.     Communication difficulties present during current the psychiatric procedure include:  None.        Signature/Title:     Ernestine Sanders, Doctoral Psychology Intern (PsyD)

## 2023-10-27 NOTE — PROGRESS NOTES
I was present for the entire group therapy session and actively participated in the session.  Candi Mera, Ph.D.   Licensed Psychologist

## 2023-11-13 ENCOUNTER — OFFICE VISIT (OUTPATIENT)
Dept: PSYCHOLOGY | Facility: CLINIC | Age: 28
End: 2023-11-13
Payer: COMMERCIAL

## 2023-11-13 DIAGNOSIS — F64.0 GENDER DYSPHORIA IN ADULT: Primary | ICD-10-CM

## 2023-11-13 DIAGNOSIS — F33.3 MAJOR DEPRESSIVE DISORDER, RECURRENT, SEVERE WITH PSYCHOTIC FEATURES (H): ICD-10-CM

## 2023-11-13 PROCEDURE — 90853 GROUP PSYCHOTHERAPY: CPT | Performed by: PSYCHOLOGIST

## 2023-11-14 NOTE — PROGRESS NOTES
[facility] - Progress Note    Date of Service: 23   Name: Kd Slater (Dav)  : 1995  Medical Record Number: [id number]  Treating Provider: Candi Mera, PhD   Type of Session: group  Present in Session: Client  Type of Session: Group  Number of Minutes: 120 mins with group members present  Therapist(s): Candi Mera, PhD, LP     Video start time: 6:30 PM  Video end time: 8:30 PM     SERVICE MODALITY:  In Person      DSM-5 Diagnoses:  F64.0 - Gender Dysphoria in Adolescent and Adult   296.33 Recurrent Major depression      Current Reported Symptoms and Status update:  Experiences gender dysphoria;  ups and down with mood and self-perception, experiences paranoia and struggles in interpersonal relationships.      Changes since last session - had worn a dress to work but did not find this gratifying.  Instead was neutral experience based on how negative  work has been in the past.     Progress Toward Treatment Goals:   Satisfactory      Therapeutic Interventions/Treatment Strategies:     Area(s) of treatment focus addressed in this session included Symptom Management, Interpersonal Relationship Skills, Gender Health and Wellness       Cognitive behavioral, interpersonal, and group therapy interventions were utilized to explore gender dysphoria (i.e., transition, interpersonal relationships, and medical interventions) and mental health symptoms, specifically depression and anxiety.   Dav checked in about presenting in a more aligned way at work.  Was not able to celebrate this however because of how toxic work has been for her.  Did not request time today but listened attentively while others shared their experiences.  Was able to give important feedback to one person that was received very favorably and showed a high degree of insight.      Treatment modalities used include [facility] THERAPY INTERVENTIONS: Motivational Interviewing Group Dynamic Therapy  Behavioral  Activation: Explored how behaviors effect mood and interact with thoughts and feelings;  Promoting gender health and resiliency.      Patient Response:   Patient responded to session by listening and actively engaged.      Possible barriers to participation / learning include: none noted     Current Mental Status Exam:   Appearance:  Appropriate   Eye Contact:  Good   Attitude / Demeanor: Cooperative   Speech      Rate / Production: Normal/ Responsive      Volume:  Normal  volume  Orientation:  All  Mood:   Positive  Affect:   Appropriate   Thought Content: Clear   Insight:   Good           Plan/Need for Future Services:  Return for group therapy in 2 weeks to treat diagnosed problems.    Patient has a current master individualized treatment plan.  See Epic treatment plan for more information.     Referral / Collaboration:  Referral to another professional/service is not indicated at this time.  Emergency Services Needed?  No     Assignment:  none     Interactive Complexity:  There are four specific communication difficulties that complicate the work of the primary psychiatric procedure.  Interactive complexity (+46864) may be reported when at least one of these difficulties is present.     Communication difficulties present during current the psychiatric procedure include:  None.        Signature/Title:     Candi Mera LP

## 2023-11-22 ENCOUNTER — VIRTUAL VISIT (OUTPATIENT)
Dept: PSYCHOLOGY | Facility: CLINIC | Age: 28
End: 2023-11-22
Payer: COMMERCIAL

## 2023-11-22 DIAGNOSIS — F33.3 MAJOR DEPRESSIVE DISORDER, RECURRENT, SEVERE WITH PSYCHOTIC FEATURES (H): ICD-10-CM

## 2023-11-22 DIAGNOSIS — F64.0 GENDER DYSPHORIA IN ADULT: Primary | ICD-10-CM

## 2023-11-22 PROCEDURE — 90837 PSYTX W PT 60 MINUTES: CPT | Mod: VID | Performed by: MARRIAGE & FAMILY THERAPIST

## 2023-11-22 NOTE — PROGRESS NOTES
[facility] - Progress Note    Date of Service: 23   Name: Kd Slater  : 1995  Medical Record Number: [id number]  Treating Provider: Tenisha Stewart PsyD  Type of Session: Individual  Present in Session: Client  Session Start and Stop Time: 2:00-2:57  Number of Minutes: 57    SERVICE MODALITY:  Video Visit:      Provider verified identity through the following two step process.  Patient provided:  Patient was verified at admission/transfer    Telemedicine Visit: The patient's condition can be safely assessed and treated via synchronous audio and visual telemedicine encounter.      Reason for Telemedicine Visit: Patient convenience (e.g. access to timely appointments / distance to available provider)    Originating Site (Patient Location): Patient's home    Distant Site (Provider Location): [facility]    Consent:  The patient/guardian has verbally consented to: the potential risks and benefits of telemedicine (video visit) versus in person care; bill my insurance or make self-payment for services provided; and responsibility for payment of non-covered services.     Patient would like the video invitation sent by:  My Chart    Mode of Communication:  Video Conference via [facility]    Distant Location (Provider):  On-site    As the provider I attest to compliance with applicable laws and regulations related to telemedicine.    DSM-5 Diagnoses:  Encounter Diagnoses   Name Primary?     Gender dysphoria in adult Yes     Major depressive disorder, recurrent, severe with psychotic features (H)      Current Reported Symptoms and Status update:  Changes since last session- client reports anxiety, paranoia, rumination, persecutory distortions, gender dysphoria.     Progress Toward Treatment Goals:   Satisfactory progress     Therapeutic Interventions/Treatment Strategies:    Area(s) of treatment focus addressed in this session included Symptom  Management, Interpersonal Relationship Skills and Gender Health    Psychotherapist offered support, feedback and validation and reinforced use of skills Treatment modalities used include Gender Affirming Care Interpersonal Other: Assisted patient  in finding ways to adapt functioning in light of past traumatic experiences and Explored with patient how life transitions may impact mental health and functioning  and discussed gender literacy  Support, Redirection and Feedback    Patient Response:   Patient responded to session by verbalizing understanding and actively engaged  Possible barriers to participation / learning include: N/A    Current Mental Status Exam:   Appearance:  Appropriate   Eye Contact:  Good   Attitude / Demeanor: Cooperative   Speech      Rate / Production: Normal/ Responsive      Volume:  Normal  volume  Orientation:  All  Mood:   Normal  Affect:   Appropriate   Thought Content: Clear   Insight:   Good       Plan/Need for Future Services:  Return for therapy in 2 weeks to treat diagnosed problems.    Patient has a current master individualized treatment plan.  See Epic treatment plan for more information.    Referral / Collaboration:  Referral to another professional/service is not indicated at this time..  Emergency Services Needed?  No    Assignment:  None    Interactive Complexity:  There are four specific communication difficulties that complicate the work of the primary psychiatric procedure.  Interactive complexity (+45798) may be reported when at least one of these difficulties is present.    Communication difficulties present during current the psychiatric procedure include:  1. None.      Signature/Title: Tenisha Stewart PsyD

## 2023-11-22 NOTE — NURSING NOTE
Is the patient currently in the state of [place]? YES    Visit mode:VIDEO    If the visit is dropped, the patient can be reconnected by: VIDEO VISIT: Send to e-mail at: [email or web address]    Will anyone else be joining the visit? NO  (If patient encounters technical issues they should call [phone]    How would you like to obtain your AVS? MyChart    Are changes needed to the allergy or medication list? N/A    Reason for visit: RECHECK    Demi ALFARO

## 2023-11-27 ENCOUNTER — OFFICE VISIT (OUTPATIENT)
Dept: PSYCHOLOGY | Facility: CLINIC | Age: 28
End: 2023-11-27
Payer: COMMERCIAL

## 2023-11-27 DIAGNOSIS — F64.0 GENDER DYSPHORIA IN ADULT: Primary | ICD-10-CM

## 2023-11-27 DIAGNOSIS — F33.3 MAJOR DEPRESSIVE DISORDER, RECURRENT, SEVERE WITH PSYCHOTIC FEATURES (H): ICD-10-CM

## 2023-11-27 PROCEDURE — 90853 GROUP PSYCHOTHERAPY: CPT | Mod: HN | Performed by: PSYCHOLOGIST

## 2023-11-28 NOTE — PROGRESS NOTES
"    [facility] Progress Note    Date of Service: 23   Name: Kd Reyes)  : 1995  Medical Record Number: [id number]  Treating Provider: Candi Mera, PhD   Type of Session: group  Present in Session: Client  Type of Session: Group  Number of Minutes: 120 mins with group members present  Therapist(s): Candi Mera, PhD, LP     Video start time: 6:30 PM  Video end time: 8:30 PM     SERVICE MODALITY:  In Person      DSM-5 Diagnoses:  F64.0 - Gender Dysphoria in Adolescent and Adult   296.33 Recurrent Major depression      Current Reported Symptoms and Status update:  Experiences gender dysphoria;  ups and down with mood and self-perception, experiences paranoia and struggles in interpersonal relationships.      Changes since last session - Reported feeling \"weirdly good,\" since being at aunt's house presenting as fem and being  from roommate/environment; recognized that paranoia may be associated with it.     Progress Toward Treatment Goals:   Satisfactory      Therapeutic Interventions/Treatment Strategies:     Area(s) of treatment focus addressed in this session included Symptom Management, Interpersonal Relationship Skills, Gender Health and Wellness       Cognitive behavioral, interpersonal, and group therapy interventions were utilized to explore gender dysphoria (i.e., transition, interpersonal relationships, and medical interventions) and mental health symptoms, specifically depression and anxiety.   Dav asked to take time in group, but was willing to allow other group members to take time.  Instead, Dav listened attentively while others shared their experiences.  Was able to give important feedback to group members that was received very favorably.      Treatment modalities used include [facility] THERAPY INTERVENTIONS: Motivational Interviewing Group Dynamic Therapy  Behavioral Activation: Explored how behaviors effect mood and interact with thoughts " and feelings;  Promoting gender health and resiliency.      Patient Response:   Patient responded to session by listening and actively engaged.      Possible barriers to participation / learning include: none noted     Current Mental Status Exam:   Appearance:  Appropriate   Eye Contact:  Good   Attitude / Demeanor: Cooperative   Speech      Rate / Production: Normal/ Responsive      Volume:  Normal  volume  Orientation:  All  Mood:   Positive  Affect:   Appropriate   Thought Content: Clear   Insight:   Good      Plan/Need for Future Services:  Return for group therapy in 2 weeks to treat diagnosed problems.    Patient has a current master individualized treatment plan.  See Epic treatment plan for more information.     Referral / Collaboration:  Referral to another professional/service is not indicated at this time.  Emergency Services Needed?  No     Assignment:  none     Interactive Complexity:  There are four specific communication difficulties that complicate the work of the primary psychiatric procedure.  Interactive complexity (+10539) may be reported when at least one of these difficulties is present.     Communication difficulties present during current the psychiatric procedure include:  None.        Signature/Title:     Ernestine Sanders, Doctoral Psychology Intern (PsFiliberto)

## 2023-12-08 ENCOUNTER — VIRTUAL VISIT (OUTPATIENT)
Dept: PSYCHOLOGY | Facility: CLINIC | Age: 28
End: 2023-12-08
Payer: COMMERCIAL

## 2023-12-08 DIAGNOSIS — F33.3 MAJOR DEPRESSIVE DISORDER, RECURRENT, SEVERE WITH PSYCHOTIC FEATURES (H): ICD-10-CM

## 2023-12-08 DIAGNOSIS — F64.0 GENDER DYSPHORIA IN ADULT: Primary | ICD-10-CM

## 2023-12-08 PROCEDURE — 90837 PSYTX W PT 60 MINUTES: CPT | Mod: VID | Performed by: MARRIAGE & FAMILY THERAPIST

## 2023-12-08 NOTE — NURSING NOTE
Is the patient currently in the state of [place]? YES    Visit mode:VIDEO    If the visit is dropped, the patient can be reconnected by: VIDEO VISIT: Text to cell phone:   Telephone Information:   Mobile [phone]       Will anyone else be joining the visit? No  (If patient encounters technical issues they should call [phone])    How would you like to obtain your AVS? MyChart    Are changes needed to the allergy or medication list? N/A    Rooming Documentation: Questionnaire(s) not done per department protocol.    Reason for visit: ANGELINA Gilmore

## 2023-12-08 NOTE — TELEPHONE ENCOUNTER
FUTURE VISIT INFORMATION      FUTURE VISIT INFORMATION:  Date: 3/5/24  Time: 9:00am  Location: [facility]  REFERRAL INFORMATION:  Reason for visit/diagnosis  Gender Care    RECORDS REQUESTED FROM:      No recs to collect

## 2023-12-08 NOTE — PROGRESS NOTES
[facility] - Progress Note    Date of Service: 23   Name: dK Slater  : 1995  Medical Record Number: [id number]  Treating Provider: Tenisha Stewart PsyD   Type of Session: Individual  Present in Session: Client  Session Start and Stop Time: 10-10:58  Number of Minutes: 58    SERVICE MODALITY:  Video Visit:      Provider verified identity through the following two step process.  Patient provided:  Patient was verified at admission/transfer    Telemedicine Visit: The patient's condition can be safely assessed and treated via synchronous audio and visual telemedicine encounter.      Reason for Telemedicine Visit: Patient convenience (e.g. access to timely appointments / distance to available provider)    Originating Site (Patient Location): Patient's home    Distant Site (Provider Location): [facility]    Consent:  The patient/guardian has verbally consented to: the potential risks and benefits of telemedicine (video visit) versus in person care; bill my insurance or make self-payment for services provided; and responsibility for payment of non-covered services.     Patient would like the video invitation sent by:  My Chart    Mode of Communication:  Video Conference via [facility]    Distant Location (Provider):  On-site    As the provider I attest to compliance with applicable laws and regulations related to telemedicine.    DSM-5 Diagnoses:  Encounter Diagnoses   Name Primary?     Gender dysphoria in adult Yes     Major depressive disorder, recurrent, severe with psychotic features (H)      Current Reported Symptoms and Status update:  Changes since last session- client reports feeling progress with gender actualization, continued struggle with reality testing, persecutory thoughts, depressed mood.    Progress Toward Treatment Goals:   Satisfactory progress     Therapeutic Interventions/Treatment Strategies:    Area(s) of treatment focus  addressed in this session included Symptom Management, Interpersonal Relationship Skills and Gender Health    Psychotherapist offered support, feedback and validation and reinforced use of skills Treatment modalities used include Narrative Therapy Gender Affirming Care Interpersonal Cognitive Restructuring:  Explored impact of ineffective thoughts / distortions on mood and activity, Assisted patient in formulating new neutral/positive alternatives to challenge less helpful / ineffective thoughts and explore role of gender, developmental phase, and role of self care. and Other: Explored with patient how life transitions may impact mental health and functioning   Support and Feedback    Patient Response:   Patient responded to session by verbalizing understanding and actively engaged  Possible barriers to participation / learning include: N/A    Current Mental Status Exam:   Appearance:  Appropriate   Eye Contact:  Good   Attitude / Demeanor: Cooperative   Speech      Rate / Production: Normal/ Responsive      Volume:  Normal  volume  Orientation:  All  Mood:   Normal  Affect:   Appropriate   Thought Content: Clear   Insight:   Good       Plan/Need for Future Services:  Return for therapy in 2 weeks to treat diagnosed problems.    Patient has a current master individualized treatment plan.  See Epic treatment plan for more information.    Referral / Collaboration:  Referral to another professional/service is not indicated at this time..  Emergency Services Needed?  No    Assignment:  none    Interactive Complexity:  There are four specific communication difficulties that complicate the work of the primary psychiatric procedure.  Interactive complexity (+06117) may be reported when at least one of these difficulties is present.    Communication difficulties present during current the psychiatric procedure include:  1. None.      Signature/Title:    Tenisha Stewart PsyD

## 2023-12-11 ENCOUNTER — OFFICE VISIT (OUTPATIENT)
Dept: PSYCHOLOGY | Facility: CLINIC | Age: 28
End: 2023-12-11
Payer: COMMERCIAL

## 2023-12-11 DIAGNOSIS — F41.1 GENERALIZED ANXIETY DISORDER: ICD-10-CM

## 2023-12-11 DIAGNOSIS — F33.3 MAJOR DEPRESSIVE DISORDER, RECURRENT, SEVERE WITH PSYCHOTIC FEATURES (H): ICD-10-CM

## 2023-12-11 DIAGNOSIS — F64.0 GENDER DYSPHORIA IN ADULT: Primary | ICD-10-CM

## 2023-12-11 PROCEDURE — 90853 GROUP PSYCHOTHERAPY: CPT | Performed by: PSYCHOLOGIST

## 2023-12-12 ENCOUNTER — PRE VISIT (OUTPATIENT)
Dept: OTOLARYNGOLOGY | Facility: CLINIC | Age: 28
End: 2023-12-12

## 2023-12-18 NOTE — PROGRESS NOTES
[facility] - Progress Note    Date of Service: 23   Name: Kd Slater (Dav)  : 1995  Medical Record Number: [id number]  Treating Provider: Candi Mera, PhD   Type of Session: group  Present in Session: Client  Type of Session: Group  Number of Minutes: 120 mins with group members present  Therapist(s): Candi Mera, PhD, LP     Video start time: 6:30 PM  Video end time: 8:30 PM     SERVICE MODALITY:  In Person      DSM-5 Diagnoses:  F64.0 - Gender Dysphoria in Adolescent and Adult   296.33 Recurrent Major depression      Current Reported Symptoms and Status update:  Experiences gender dysphoria;  ups and down with mood and self-perception, experiences paranoia and struggles in interpersonal relationships.      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     Progress Toward Treatment Goals:   Satisfactory      Therapeutic Interventions/Treatment Strategies:     Area(s) of treatment focus addressed in this session included Symptom Management, Interpersonal Relationship Skills, Gender Health and Wellness       Cognitive behavioral, interpersonal, and group therapy interventions were utilized to explore gender dysphoria (i.e., transition, interpersonal relationships, and medical interventions) and mental health symptoms, specifically depression and anxiety.   Dav asked to take time in group and was able to share the extent to which she doesn't like to share in group.  Wasn't able to share more about that but did then share about her experiences over the last couple of weeks -- some good, some difficult.  When checked in about how it was to share, Dav shared she was happy that she did.  Dav also  listened attentively while others shared their experiences and was able to give important feedback to group members that was received very favorably.  In particular, she talked about the importance of having a place to share  "experiences that are difficult as trans people.       Treatment modalities used include [facility] THERAPY INTERVENTIONS: Motivational Interviewing Group Dynamic Therapy  Behavioral Activation: Explored how behaviors effect mood and interact with thoughts and feelings;  Promoting gender health and resiliency.      Patient Response:   Patient responded to session by listening and actively engaged.      Possible barriers to participation / learning include: some self-described paranoia;      Current Mental Status Exam:   Appearance:  Appropriate   Eye Contact:  Good   Attitude / Demeanor: Cooperative   Speech      Rate / Production: Normal/ Responsive      Volume:  Normal  volume  Orientation:  All  Mood:   \"Not bad\"   Affect:   Appropriate   Thought Content: Clear   Insight:   Good      Plan/Need for Future Services:  Return for group therapy in 2 weeks to treat diagnosed problems.    Patient has a current master individualized treatment plan.  See Epic treatment plan for more information.     Referral / Collaboration:  Referral to another professional/service is not indicated at this time.  Emergency Services Needed?  No     Assignment:  none     Interactive Complexity:  There are four specific communication difficulties that complicate the work of the primary psychiatric procedure.  Interactive complexity (+41046) may be reported when at least one of these difficulties is present.     Communication difficulties present during current the psychiatric procedure include:  None.        Signature/Title:    Candi Mera, PhD  Licensed Psychologist       "

## 2024-01-08 ENCOUNTER — OFFICE VISIT (OUTPATIENT)
Dept: PSYCHOLOGY | Facility: CLINIC | Age: 29
End: 2024-01-08
Payer: COMMERCIAL

## 2024-01-08 DIAGNOSIS — F33.3 MAJOR DEPRESSIVE DISORDER, RECURRENT, SEVERE WITH PSYCHOTIC FEATURES (H): ICD-10-CM

## 2024-01-08 DIAGNOSIS — F64.0 GENDER DYSPHORIA IN ADULT: Primary | ICD-10-CM

## 2024-01-08 PROCEDURE — 90853 GROUP PSYCHOTHERAPY: CPT | Mod: HN | Performed by: PSYCHOLOGIST

## 2024-01-10 NOTE — PROGRESS NOTES
"    [facility] - Progress Note    Date of Service: 24   Name: Kd Reyes)  : 1995  Medical Record Number: [id number]  Treating Provider: Candi Mera, PhD   Type of Session: group  Present in Session: Client  Type of Session: Group  Number of Minutes: 120 mins with group members present  Therapist(s): Ernestine Sanders, Doctoral Psychology Intern (PsFiliberto) and Candi Mera, PhD,      Video start time: 6:30 PM  Video end time: 8:30 PM     SERVICE MODALITY:  In Person      DSM-5 Diagnoses:  F64.0 - Gender Dysphoria in Adolescent and Adult   296.33 Recurrent Major depression      Current Reported Symptoms and Status update:  Experiences gender dysphoria;  ups and down with mood and self-perception, experiences paranoia and struggles in interpersonal relationships.      Changes since last session - Reported developing more resilience around being in public, understanding herself more, internalized feelings of shame for being trans, and how psychotic symptoms exacerbate her shame but are less frequent.    Progress Toward Treatment Goals:   Satisfactory      Therapeutic Interventions/Treatment Strategies:     Area(s) of treatment focus addressed in this session included Symptom Management, Interpersonal Relationship Skills, Gender Health and Wellness       Cognitive behavioral, interpersonal, and group therapy interventions were utilized to explore gender dysphoria (i.e., transition, interpersonal relationships, and medical interventions) and mental health symptoms, specifically depression and anxiety.  Dav asked to take time in group and was able to share how parts of her identity have been invisible/hidden. Reported feeling \"doing something wrong by being trans and nurturing my inner self,\" and is actively going against shame-avoidance by wearing shirts that felt authentic to self. Noticed when she did, there was positive feedback from co-workers, and she is developing " "awareness of how psychotic symptoms maintain her shame. Dav also listened attentively while others shared their experiences and was able to give important feedback to group members that was received very favorably. In particular, she talked about being in the middle path/balance with family members who are important to her and also don't support her trans identity.     Treatment modalities used include [facility] THERAPY INTERVENTIONS: Motivational Interviewing Group Dynamic Therapy  Behavioral Activation: Explored how behaviors effect mood and interact with thoughts and feelings;  Promoting gender health and resiliency.      Patient Response:   Patient responded to session by listening and actively engaged.      Possible barriers to participation / learning include: some self-described paranoia;      Current Mental Status Exam:   Appearance:  Appropriate   Eye Contact:  Good   Attitude / Demeanor: Cooperative   Speech      Rate / Production: Normal/ Responsive      Volume:  Normal  volume  Orientation:  All  Mood:   \"Been more manageable\"  Affect:   Appropriate   Thought Content: Clear   Insight:   Good      Plan/Need for Future Services:  Return for group therapy in 2 weeks to treat diagnosed problems.    Patient has a current master individualized treatment plan.  See Epic treatment plan for more information.     Referral / Collaboration:  Referral to another professional/service is not indicated at this time.  Emergency Services Needed?  No     Assignment:  none     Interactive Complexity:  There are four specific communication difficulties that complicate the work of the primary psychiatric procedure.  Interactive complexity (+32227) may be reported when at least one of these difficulties is present.     Communication difficulties present during current the psychiatric procedure include:  None.        Signature/Title:    Ernestine Sanders, Doctoral Psychology Intern (PsyD)  "

## 2024-01-22 ENCOUNTER — OFFICE VISIT (OUTPATIENT)
Dept: PSYCHOLOGY | Facility: CLINIC | Age: 29
End: 2024-01-22
Payer: COMMERCIAL

## 2024-01-22 DIAGNOSIS — F64.0 GENDER DYSPHORIA IN ADULT: Primary | ICD-10-CM

## 2024-01-22 DIAGNOSIS — F33.3 MAJOR DEPRESSIVE DISORDER, RECURRENT, SEVERE WITH PSYCHOTIC FEATURES (H): ICD-10-CM

## 2024-01-22 PROCEDURE — 90853 GROUP PSYCHOTHERAPY: CPT | Mod: HN | Performed by: PSYCHOLOGIST

## 2024-01-23 NOTE — PROGRESS NOTES
"    [facility] - Progress Note    Date of Service: 24   Name: Kd Reyes)  : 1995  Medical Record Number: [id number]  Treating Provider: Candi Mera, PhD   Type of Session: group  Present in Session: Client  Type of Session: Group  Number of Minutes: 120 mins with group members present  Therapist(s): Ernestine Sanders, Doctoral Psychology Intern (PsyD) and Candi Mera, PhD,      Video start time: 6:30 PM  Video end time: 8:30 PM     SERVICE MODALITY:  In Person      DSM-5 Diagnoses:  F64.0 - Gender Dysphoria in Adolescent and Adult   296.33 Recurrent Major depression      Current Reported Symptoms and Status update:  Experiences gender dysphoria;  ups and down with mood and self-perception, experiences paranoia and struggles in interpersonal relationships.      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    Progress Toward Treatment Goals:   Satisfactory      Therapeutic Interventions/Treatment Strategies:     Area(s) of treatment focus addressed in this session included Symptom Management, Interpersonal Relationship Skills, Gender Health and Wellness       Cognitive behavioral, interpersonal, and group therapy interventions were utilized to explore gender dysphoria (i.e., transition, interpersonal relationships, and medical interventions) and mental health symptoms, specifically depression and anxiety.  Dav did not request time today but listened attentively while others shared their experiences. Dav also focused on her here-and-now experiences within the group based on religous generalizations made by another group member.    Treatment modalities used include " "[facility] THERAPY INTERVENTIONS: Motivational Interviewing Group Dynamic Therapy  Behavioral Activation: Explored how behaviors effect mood and interact with thoughts and feelings;  Promoting gender health and resiliency.      Patient Response:   Patient responded to session by listening and actively engaged.      Possible barriers to participation / learning include: some self-described paranoia;      Current Mental Status Exam:   Appearance:  Appropriate   Eye Contact:  Good   Attitude / Demeanor: Cooperative   Speech      Rate / Production: Normal/ Responsive      Volume:  Normal  volume  Orientation:  All  Mood:   \"Pretty good\"  Affect:   Appropriate   Thought Content: Clear   Insight:   Good      Plan/Need for Future Services:  Return for group therapy in 2 weeks to treat diagnosed problems.    Patient has a current master individualized treatment plan.  See Epic treatment plan for more information.     Referral / Collaboration:  Referral to another professional/service is not indicated at this time.  Emergency Services Needed?  No     Assignment:  none     Interactive Complexity:  There are four specific communication difficulties that complicate the work of the primary psychiatric procedure.  Interactive complexity (+58079) may be reported when at least one of these difficulties is present.     Communication difficulties present during current the psychiatric procedure include:  None.        Signature/Title:    Ernestine Sanders, Doctoral Psychology Intern (PsyD)  "

## 2024-02-05 ENCOUNTER — VIRTUAL VISIT (OUTPATIENT)
Dept: PSYCHOLOGY | Facility: CLINIC | Age: 29
End: 2024-02-05
Payer: COMMERCIAL

## 2024-02-05 DIAGNOSIS — F64.0 GENDER DYSPHORIA IN ADULT: Primary | ICD-10-CM

## 2024-02-05 DIAGNOSIS — F41.1 GENERALIZED ANXIETY DISORDER: ICD-10-CM

## 2024-02-05 PROCEDURE — 90837 PSYTX W PT 60 MINUTES: CPT | Mod: 95 | Performed by: MARRIAGE & FAMILY THERAPIST

## 2024-02-05 NOTE — PROGRESS NOTES
"    [facility] - Progress Note    Date of Service: 24   Name: Kd \"Trudy Slater  : 1995  Medical Record Number: [id number]  Treating Provider: Tenisha Stewart PsyD  Type of Session: Individual  Present in Session: Client  Session Start and Stop Time: 2:03-2:58  Number of Minutes: 55    SERVICE MODALITY:  Video Visit:      Provider verified identity through the following two step process.  Patient provided:  Patient was verified at admission/transfer    Telemedicine Visit: The patient's condition can be safely assessed and treated via synchronous audio and visual telemedicine encounter.      Reason for Telemedicine Visit: Patient convenience (e.g. access to timely appointments / distance to available provider)    Originating Site (Patient Location): Patient's home    Distant Site (Provider Location): [facility]    Consent:  The patient/guardian has verbally consented to: the potential risks and benefits of telemedicine (video visit) versus in person care; bill my insurance or make self-payment for services provided; and responsibility for payment of non-covered services.     Patient would like the video invitation sent by:  My Chart    Mode of Communication:  Video Conference via [employer] [facility]    Distant Location (Provider):  On-site    As the provider I attest to compliance with applicable laws and regulations related to telemedicine.    DSM-5 Diagnoses:  Encounter Diagnoses   Name Primary?     Gender dysphoria in adult Yes     Generalized anxiety disorder      Current Reported Symptoms and Status update:  Changes since last session- client reports ups and downs related to her dysphoria and mental health, anxiety.     Progress Toward Treatment Goals:   Satisfactory progress     Therapeutic Interventions/Treatment Strategies:    Area(s) of treatment focus addressed in this session included Interpersonal Relationship Skills, Gender Health and " Maintenance of Progress    Psychotherapist offered support, feedback and validation, provided redirection and reinforced use of skills Treatment modalities used include Narrative Therapy [facility] Interpersonal Other: Explored with patient how life transitions may impact mental health and functioning  and explored intersectional identities and role of white body supremacy in attitudes, shame and uncertainty. , Relationship Skills: Discussed strategies to promote healthier understanding of interpersonal relationships and discussed gender literacy  Support, Feedback and Clarification    Patient Response:   Patient responded to session by verbalizing understanding and actively engaged  Possible barriers to participation / learning include: N/A    Current Mental Status Exam:   Appearance:  Appropriate   Eye Contact:  Good   Attitude / Demeanor: Cooperative   Speech      Rate / Production: Normal/ Responsive      Volume:  Normal  volume  Orientation:  All  Mood:   Normal  Affect:   Appropriate   Thought Content: Clear   Insight:   Good       Plan/Need for Future Services:  Return for therapy in 2-3 weeks to treat diagnosed problems.    Patient has a current master individualized treatment plan.  See Epic treatment plan for more information.    Referral / Collaboration:  Referral to another professional/service is not indicated at this time..  Emergency Services Needed?  No    Assignment:  None    Interactive Complexity:  There are four specific communication difficulties that complicate the work of the primary psychiatric procedure.  Interactive complexity (+26989) may be reported when at least one of these difficulties is present.    Communication difficulties present during current the psychiatric procedure include:  1. None.      Signature/Title:    Tenisha Stewart PsyD

## 2024-02-05 NOTE — NURSING NOTE
Is the patient currently in the state of [place]? YES    Visit mode:VIDEO    If the visit is dropped, the patient can be reconnected by: VIDEO VISIT: Send to e-mail at: [email or web address]    Will anyone else be joining the visit? NO  (If patient encounters technical issues they should call [phone]    How would you like to obtain your AVS? MyChart    Are changes needed to the allergy or medication list? No    Reason for visit: RECHNAPOLEON ALFARO

## 2024-02-12 ENCOUNTER — OFFICE VISIT (OUTPATIENT)
Dept: PSYCHOLOGY | Facility: CLINIC | Age: 29
End: 2024-02-12
Payer: COMMERCIAL

## 2024-02-12 DIAGNOSIS — F33.3 MAJOR DEPRESSIVE DISORDER, RECURRENT, SEVERE WITH PSYCHOTIC FEATURES (H): ICD-10-CM

## 2024-02-12 DIAGNOSIS — F64.0 GENDER DYSPHORIA IN ADULT: Primary | ICD-10-CM

## 2024-02-12 PROCEDURE — 90853 GROUP PSYCHOTHERAPY: CPT | Performed by: PSYCHOLOGIST

## 2024-02-13 NOTE — PROGRESS NOTES
[facility] - Progress Note    Date of Service: 24   Name: Kd Slater (Dav)  : 1995  Medical Record Number: [id number]  Treating Provider: Candi Mera, PhD   Type of Session: group  Present in Session: Client  Type of Session: Group  Number of Minutes: 120 mins with group members present  Therapist(s): Ernestine Sanders, Doctoral Psychology Intern (PsFiliberto) and Candi Mera, PhD,      Video start time: 6:30 PM  Video end time: 8:30 PM     SERVICE MODALITY:  In Person      DSM-5 Diagnoses:  F64.0 - Gender Dysphoria in Adolescent and Adult   296.33 Recurrent Major depression      Current Reported Symptoms and Status update:  Experiences gender dysphoria;  ups and down with mood and self-perception, experiences paranoia and struggles in interpersonal relationships.      Changes since last session:  started on progesterone and describes it as incredibly positive across both mental health and somatic experiences.     Progress Toward Treatment Goals:   Satisfactory      Therapeutic Interventions/Treatment Strategies:     Area(s) of treatment focus addressed in this session included Symptom Management, Interpersonal Relationship Skills, Gender Health and Wellness       Cognitive behavioral, interpersonal, and group therapy interventions were utilized to explore gender dysphoria (i.e., transition, interpersonal relationships, and medical interventions) and mental health symptoms, specifically depression and anxiety.  Dav  participated in a group discussion about how the group wants to approach the topic of suicidality and her own experiences with it.  Shared the positivity that she is experiencing since she took some active steps to gain her own autonomy and make decisions for her. Is not engaging in the level of negative self talk and isolation that she was experiencing.  Was able to be assertive with grandparents. Did not request time today but was asked whether she would like  to share more about her adelaida.  She also listened attentively while others shared their experiences.     Treatment modalities used include [facility] THERAPY INTERVENTIONS: Motivational Interviewing Group Dynamic Therapy  Behavioral Activation: Explored how behaviors effect mood and interact with thoughts and feelings;  Promoting gender health and resiliency.      Patient Response:   Patient responded to session by listening and actively engaged.      Possible barriers to participation / learning include: some self-described paranoia      Current Mental Status Exam:   Appearance:  Appropriate   Eye Contact:  Good   Attitude / Demeanor: Cooperative and engaged  Speech      Rate / Production: Normal/ Responsive      Volume:  Normal  volume  Orientation:  All  Mood:   Happy and much less self-conscious  Affect:   Appropriate   Thought Content: Clear   Insight:   Good      Plan/Need for Future Services:  Return for group therapy in 2 weeks to treat diagnosed problems.    Patient has a current master individualized treatment plan.  See Epic treatment plan for more information.     Referral / Collaboration:  Referral to another professional/service is not indicated at this time.  Emergency Services Needed?  No     Assignment:  none     Interactive Complexity:  There are four specific communication difficulties that complicate the work of the primary psychiatric procedure.  Interactive complexity (+11302) may be reported when at least one of these difficulties is present.     Communication difficulties present during current the psychiatric procedure include:  None.        Signature/Title:      Candi Mera, PhD  Licensed Psychologist

## 2024-02-26 ENCOUNTER — OFFICE VISIT (OUTPATIENT)
Dept: PSYCHOLOGY | Facility: CLINIC | Age: 29
End: 2024-02-26
Payer: COMMERCIAL

## 2024-02-26 DIAGNOSIS — F33.3 MAJOR DEPRESSIVE DISORDER, RECURRENT, SEVERE WITH PSYCHOTIC FEATURES (H): ICD-10-CM

## 2024-02-26 DIAGNOSIS — F64.0 GENDER DYSPHORIA IN ADULT: Primary | ICD-10-CM

## 2024-02-26 PROCEDURE — 90853 GROUP PSYCHOTHERAPY: CPT | Mod: HN | Performed by: PSYCHOLOGIST

## 2024-02-27 NOTE — PROGRESS NOTES
"    [facility] - Progress Note    Date of Service: 24   Name: Kd Reyes)  : 1995  Medical Record Number: [id number]  Treating Provider: Candi Mera, PhD   Type of Session: group  Present in Session: Client  Type of Session: Group  Number of Minutes: 120 mins with group members present  Therapist(s): Ernestine Sanders, Doctoral Psychology Intern (PsFiliberto) and Candi Mera, PhD,      Video start time: 6:30 PM  Video end time: 8:30 PM     SERVICE MODALITY:  In Person      DSM-5 Diagnoses:  F64.0 - Gender Dysphoria in Adolescent and Adult   296.33 Recurrent Major depression      Current Reported Symptoms and Status update:  Experiences gender dysphoria;  ups and down with mood and self-perception, experiences paranoia and struggles in interpersonal relationships.      Changes since last session: Reported last week was bad, increase in SI, struggling with feelings more than her thoughts, experienced a recent episode that felt like a flashback, and navigating trauma.    Last week was really bad, struggling wiith feelings and not thoughts, situation where she was \"cowardly\" , episode of flashbacks,      Progress Toward Treatment Goals:   Satisfactory      Therapeutic Interventions/Treatment Strategies:     Area(s) of treatment focus addressed in this session included Symptom Management, Interpersonal Relationship Skills, Gender Health and Wellness       Cognitive behavioral, interpersonal, and group therapy interventions were utilized to explore gender dysphoria (i.e., transition, interpersonal relationships, and medical interventions) and mental health symptoms, specifically depression and anxiety.  Dav  participated in a group discussion, despite being hesitant due to \"group being looming.\" Other group members encouraged her to share, specifically given their concern around her increase of SI last week. Expressed feeling unsure what is true/reality at work and with " "roommates, given the amount of invalidation she has received as a child, teen, and adult. As a result, tends to notice when people do not acknowledge her and it makes her question her own reality. Byron more grounded and present after talking in group and receiving feedback from other group member's on how unsafe/invalidating people can create confusion.    Treatment modalities used include [facility] THERAPY INTERVENTIONS: Motivational Interviewing Group Dynamic Therapy  Behavioral Activation: Explored how behaviors effect mood and interact with thoughts and feelings;  Promoting gender health and resiliency.      Patient Response:   Patient responded to session by listening and actively engaged.      Possible barriers to participation / learning include: some self-described paranoia      Current Mental Status Exam:   Appearance:  Appropriate   Eye Contact:  Good   Attitude / Demeanor: Cooperative and engaged  Speech      Rate / Production: Normal/ Responsive      Volume:  Normal  volume  Orientation:  All  Mood:   Anxious (\"buzz under my skin\")  Affect:   Appropriate   Thought Content: Clear   Insight:   Good      Plan/Need for Future Services:  Return for group therapy in 2 weeks to treat diagnosed problems.    Patient has a current master individualized treatment plan.  See Epic treatment plan for more information.     Referral / Collaboration:  Referral to another professional/service is not indicated at this time.  Emergency Services Needed?  No     Assignment:  none     Interactive Complexity:  There are four specific communication difficulties that complicate the work of the primary psychiatric procedure.  Interactive complexity (+58239) may be reported when at least one of these difficulties is present.     Communication difficulties present during current the psychiatric procedure include:  None.        Signature/Title:    Ernestine Sanders, Doctoral Psychology Intern (PsyD)  "

## 2024-03-04 ENCOUNTER — VIRTUAL VISIT (OUTPATIENT)
Dept: PSYCHOLOGY | Facility: CLINIC | Age: 29
End: 2024-03-04
Payer: COMMERCIAL

## 2024-03-04 DIAGNOSIS — F64.0 GENDER DYSPHORIA IN ADULT: Primary | ICD-10-CM

## 2024-03-04 DIAGNOSIS — F33.3 MAJOR DEPRESSIVE DISORDER, RECURRENT, SEVERE WITH PSYCHOTIC FEATURES (H): ICD-10-CM

## 2024-03-04 DIAGNOSIS — F41.1 GENERALIZED ANXIETY DISORDER: ICD-10-CM

## 2024-03-04 PROCEDURE — 90837 PSYTX W PT 60 MINUTES: CPT | Mod: 95 | Performed by: MARRIAGE & FAMILY THERAPIST

## 2024-03-04 NOTE — PROGRESS NOTES
[place] [facility] - Progress Note    Date of Service: 3/04/24   Name: Kd Slater  : 1995  Medical Record Number: [id number]  Treating Provider: Tenisha Stewart PsyD   Type of Session: Individual  Present in Session: Client  Session Start and Stop Time: 11:05-12  Number of Minutes:  55    SERVICE MODALITY:  Video Visit:      Provider verified identity through the following two step process.  Patient provided:  Patient was verified at admission/transfer    Telemedicine Visit: The patient's condition can be safely assessed and treated via synchronous audio and visual telemedicine encounter.      Reason for Telemedicine Visit: Patient convenience (e.g. access to timely appointments / distance to available provider)    Originating Site (Patient Location): Patient's home    Distant Site (Provider Location): [facility]    Consent:  The patient/guardian has verbally consented to: the potential risks and benefits of telemedicine (video visit) versus in person care; bill my insurance or make self-payment for services provided; and responsibility for payment of non-covered services.     Patient would like the video invitation sent by:  My Chart    Mode of Communication:  Video Conference via [facility]    Distant Location (Provider):  On-site    As the provider I attest to compliance with applicable laws and regulations related to telemedicine.    DSM-5 Diagnoses:  Encounter Diagnoses   Name Primary?     Generalized anxiety disorder      Gender dysphoria in adult Yes     Major depressive disorder, recurrent, severe with psychotic features (H)          Current Reported Symptoms and Status update:  Changes since last session- client reports distress, emotional ups and downs, stress related to discrimination and workplace environment. Fluctuations in gender dysphoria, anxiety.     Progress Toward Treatment Goals:   Minimal progress     Therapeutic  Interventions/Treatment Strategies:    Area(s) of treatment focus addressed in this session included Symptom Management, Interpersonal Relationship Skills and Gender Health      Psychotherapist offered support, feedback and validation, provided redirection and reinforced use of skills Treatment modalities used include Motivational Interviewing Narrative Therapy Gender Affirming Care Cognitive Restructuring:  Explored impact of ineffective thoughts / distortions on mood and activity, Emotions Management:  Discussed barriers to emotional regulation, Other: Assisted patient  in finding ways to adapt functioning in light of past traumatic experiences and Explored ways that workplace discrimination shapes experiences of gender and relationships. and Relationship Skills: Encouraged development and maintenance  of healthy boundaries  Support, Redirection and Feedback    Patient Response:   Patient responded to session by verbalizing understanding and actively engaged  Possible barriers to participation / learning include: N/A    Current Mental Status Exam:   Appearance:  Appropriate   Eye Contact:  Good   Attitude / Demeanor: Cooperative   Speech      Rate / Production: Normal/ Responsive      Volume:  Normal  volume  Orientation:  All  Mood:   Anxious  Depressed   Affect:   Appropriate   Thought Content: Clear   Insight:   Good       Plan/Need for Future Services:  Return for therapy in 2 weeks to treat diagnosed problems.    Patient has a current master individualized treatment plan.  See Epic treatment plan for more information.    Referral / Collaboration:  Referral to another professional/service is not indicated at this time..  Emergency Services Needed?  No    Assignment:  None    Interactive Complexity:  There are four specific communication difficulties that complicate the work of the primary psychiatric procedure.  Interactive complexity (+10570) may be reported when at least one of these difficulties is  present.    Communication difficulties present during current the psychiatric procedure include:  1. None.      Signature/Title:    Tenisha Stewart PsyD

## 2024-03-04 NOTE — NURSING NOTE
Is the patient currently in the state of [place]? YES    Visit mode:VIDEO    If the visit is dropped, the patient can be reconnected by: VIDEO VISIT: Text to cell phone:   Telephone Information:   Mobile [phone]       Will anyone else be joining the visit? No  (If patient encounters technical issues they should call [phone])    How would you like to obtain your AVS? MyChart    Are changes needed to the allergy or medication list? N/A    Rooming Documentation: Questionnaire(s) not done per department protocol.    Reason for visit: ANGELINA Gilmore

## 2024-03-05 ENCOUNTER — OFFICE VISIT (OUTPATIENT)
Dept: OTOLARYNGOLOGY | Facility: CLINIC | Age: 29
End: 2024-03-05
Payer: COMMERCIAL

## 2024-03-05 ENCOUNTER — PRE VISIT (OUTPATIENT)
Dept: OTOLARYNGOLOGY | Facility: CLINIC | Age: 29
End: 2024-03-05

## 2024-03-05 DIAGNOSIS — R49.9 VOICE AND RESONANCE DEFECT: ICD-10-CM

## 2024-03-05 DIAGNOSIS — F33.3 MAJOR DEPRESSIVE DISORDER, RECURRENT, SEVERE WITH PSYCHOTIC FEATURES (H): ICD-10-CM

## 2024-03-05 DIAGNOSIS — F64.0 GENDER DYSPHORIA IN ADULT: ICD-10-CM

## 2024-03-05 DIAGNOSIS — R49.0 DYSPHONIA: Primary | ICD-10-CM

## 2024-03-05 DIAGNOSIS — R49.9 VOICE AND RESONANCE DISORDER: ICD-10-CM

## 2024-03-05 PROCEDURE — 92524 BEHAVRAL QUALIT ANALYS VOICE: CPT | Mod: GN | Performed by: SPEECH-LANGUAGE PATHOLOGIST

## 2024-03-05 PROCEDURE — 92520 LARYNGEAL FUNCTION STUDIES: CPT | Mod: 52 | Performed by: SPEECH-LANGUAGE PATHOLOGIST

## 2024-03-05 PROCEDURE — 92507 TX SP LANG VOICE COMM INDIV: CPT | Mod: GN | Performed by: SPEECH-LANGUAGE PATHOLOGIST

## 2024-03-05 NOTE — PROGRESS NOTES
[facility]  [facility] - Dept. of Otolaryngology   Evaluation report - IN PERSON APPOINTMENT    Clinician: Ernestine Navas M.M. (voice), M.A., CCC-SLP  Referring physician:  Pat  Patient: Dav Slater  Date of Visit: 3/5/2024    HISTORY    Chief complaint: Bryon is a 28 year old presenting today for evaluation of voice and resonance disorder and dysphonia in the context of gender dysphoria        CURRENT SYMPTOMS INCLUDE  VOICE:   Would like voice to be less masculine  Needs more structure  Has not worked a lot with the voice just yet.   Voice is inconsistent can be all over  If she doesn't talk for a while the voice can become unstable in pitch  No fatigue by the end of the day  Talking with people  - little above normal. Checking pt in   Singing around the house, for fun.     THROAT ISSUES:   Lots of throat clearing  Usually constant  Feels a lump in the throat as well.   Worse when smoking    RESPIRATION:   Allergies - seasonal  No asthma  Both in terms of smoking - daily, but less than 6 per day    SWALLOWING:   No issues    ADDITIONAL:  Reflux: GERD - changed diet    OTHER PERTINENT HISTORY    Past Medical History:   Diagnosis Date    Pneumonia, organism unspecified(486)      No past surgical history on file.    OBJECTIVE  PATIENT REPORTED MEASURES  86/120  Feminine  masculine    Patient Supplied Answers To VHI Questionnaire       No data to display                Patient Supplied Answers To CSI Questionnaire       No data to display                Patient Supplied Answers To EAT Questionnaire       No data to display                    PERCEPTUAL EVALUATION (CPT 26523)  POSTURE / TENSION/ PALPATION OF THE LARYNGEAL AREA:   upper body    BREATHING:   clavicular elevation on inspiration    LARYNGEAL PALPATION:   firm musculature    VOICE:  Bryon states that today is a typical voice today, with clinician observing voice quality to be characterized as:  Roughness:  WNL  Breathiness: WNL  Strain: WNL  Pitch:  F0/Habitual/Conversational speech: informally judged as WNL  Voiced /i/ Max Phonation Time: informally judged as WNL  Voiceless /s/ max: informally judged as WNL  Pitch glide: neurologically normal  Maximum Phonation Time: informally judged as WNL  Resonance:   Conversational speech:  laryngeal pharyngeal resonance  Loudness  Conversational speech:  WNL  GLOBAL ASSESSMENT OF DYSPHONIA: 30/100    COUGH/THROAT CLEARING:  Not observed    LARYNGEAL FUNCTION STUDIES (CPT 56164)  /a/ mean f0 = 143.202 Hz (SD = 1.453)  /i/ mean f0 = 142.765 Hz (SD = 1.098)  Glide:     Lowest f0 = 79.450 Hz      Higest f0 = 1470.384 Hz      Range = 1390.934 Hz   Connected Speech     Mean f0 = 127.121 Hz (SD 25.977)     Median f0 = 121.308 Hz      Lowest f0 = 100.921 Hz      Highest f0 = 436.703 Hz      Speaking Range = 335.782 Hz         LARYNGEAL EXAMINATION  Laryngeal exam was discussed but determined not to be warranted today.    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    Remarkable findings included:  Perceptual evaluation demonstrated:   Roughness: WNL  Breathiness: WNL  Strain: WNL  Pitch:  F0/Habitual/Conversational speech: informally judged as WNL  Laryngeal exam demonstrated: Not warranted  Primary complaint of patient today included: Seeking of voice and that is comfortable, authentic, and safe.    Additional assessment based discussion included:  I encouraged, Dav to listen to the body and take breaks, as needed.     Therefore, we recommended a course of speech therapy to address these concerns.    RECOMMENDATIONS:   A course of speech therapy is recommended to optimize vocal technique, improve voice quality, and promote reduced  discomfort, effort and fatigue.  She demonstrates a Good prognosis for improvement given adherence to therapeutic recommendations.   Positive indicators: diagnosis is known to respond to treatment high level of comittment  Negative indicators: none  DURATION / FREQUENCY: 4 one-hour sessions.  A total of 6-8 sessions may be necessary.    GOALS:  Patient goal:   To improve and maintain a healthy voice quality  To understand the problem and fix it as much as possible    Short-term goal(s): Within the first 4 sessions, Bryon:  will utilize silent inhalation with good low-respiratory engagement 75% of the time during therapy tasks with minimal clinician support  will demonstrate semi-occluded vocal tract (SOVT) exercises with at least 80% accuracy with no clinician support  will accurately identify target vs. habitual voice quality during therapy tasks in 4 out of 5 trials with no clinician support    Long-term goal(s): In 6 months, Ms. Slater will:  Report resolution of dysphonia, and use of optimal voice quality to meet personal, social, and professional needs, 90% of the time during a typical week of vocal activities    Certification period:   Certification date from: 3/05/24  Certification date to: 6/3/24    This treatment plan was developed with the patient who agreed with the recommendations.    _______________________________________________________________________  THERAPY NOTE (CPT 56639)  Date of Service: 3/5/2024    SUBJECTIVE / OBJECTIVE:  Please refer to my evaluation report from today's encounter for full details regarding subjective data, patient reported measures, and diagnostic findings.    THERAPEUTIC ACTIVITIES  Semi-Occluded Vocal Tract (SOVT) exercises instructed to reduce laryngeal tension, promote vocal fold pliability, and coordinate respiration and phonation  Straw phonation with water resistance was found to be most facilitating   Modal B3  Sustained phonation, and voice vs. voiceless  "productions used to promote easy voicing and raise awareness of laryngeal tension  Ascending and descending glides utilized to promote vocal fold pliability  \"Messa di voce\", gradual crescendo and decrescendo to vary medial compression was also utilized to promote vocal fold pliability.  Also 3-1 and 1-3-1.  Instructed on the benefits of using these exercises for improved coordination of breath flow with phonation and tissue mobilization.  Instructed on the importance of using these exercises as a warm-up / cool down,  and to re-calibrate the voice throughout the day.      Exercises in techniques for improved airflow during phonation  Speech material with /ju/ glides (3-1)was facilitating at the word level.  Progressed to easy onset/ flow, and blending phrases  Instructed with a descending 5th, as well as an arpeggio pattern; this was helpful.  [place] techniques to reduce glottal moncada and improve breath flow; negative practice improved awareness today.    Counseling and Education  Asked many questions about the nature of her symptoms, and I answered all of these thoroughly.  Concepts of an optimal regimen for practice were instructed.  She should use an interval schedule of practice, with brief periods of practice frequently throughout each day  [place] concepts of volitional practice to facilitate motor learning.  A revised regimen for home practice was instructed.  I provided an audio recording and handouts of today's therapeutic activities to facilitate practice.    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  Ms. Slater demonstrated good early learning today    PLAN: I will see Ms. Slater TBD. She is scheduling " today      TOTAL SERVICE TIME: 60 minutes  EVALUATION OF VOICE AND RESONANCE (78570)  TREATMENT (09419)  LARYNGEAL FUNCTION STUDIES (62572) +52 mod.  NO CHARGE FACILITY FEE (61877)      Ernestine Navas M.M. (voice), M.A., CCC/SLP  Speech-Language Pathologist  [facility] Trained Vocologist  [facility]  [phone]  [email or web address]  Pronouns: she/her      *this report was created in part through the use of computerized dictation software, and though reviewed following completion, some typographic errors may persist.  If there is confusion regarding any of this notes contents, please contact me for clarification

## 2024-03-05 NOTE — PATIENT INSTRUCTIONS
Ernestine Navas M.M. (voice), M.A., CCC/SLP  Speech-Language Pathologist  [facility] Trained Vocologist  [facility]  [email or web address]   she/her  [email or web address]      Order for today:  Identity affirming voice  Moore passage with sentences     Identity Affirming Voice Training  Your voice plays a distinct role in embodying identity, and consequently, communication can be   a powerful tool for exploring and defining who you are and how people view you. No matter   where you identify on the gender spectrum - transfeminine, transmasculine, gender   nonconforming, genderqueer - the key to changing your communication style is to find the   right combination of speech/voice features that become what I call the three A s: authentic   (what feels true to you), acceptable (what is effective based on the norms of the outside world),   and automatic (what becomes easy to perform with less and less mental effort). Pitch is   typically the first thing many wish to consider, but additional speech features can also be   utilized, such as resonance and intonation. A more feminine voice sounds smaller, softer,   lighter, and more expressive, while a more masculine voice sounds bigger, heavier, and more   [id number].  Regardless of where you are in understanding your identity, speech/voice change is almost   always possible, and even the smallest of changes can make a difference in your life. Don t   avoid or put off your voice if you don t like it! If you are transitioning (to whatever extent), you   need to be willing to be in limbo as you work on it, similar to other parts of transition.   Behavioral voice change usually involves a period of solid practice, experimenting with your   voice in real life, and then letting it settle over time. With help, most people can make changes   that make a difference in their lives in terms of comfort, safety, and success in social and  "  professional situations.  It is also important to also realize that transgender and non-binary people are at risk for voice   problems, since manipulating your speech/voice, particularly when not seeking professional   help, may be done in a way that invites tension and overworking of the vocal folds. Additionally,   all speakers, transgender and cisgender, can be affected by overall health or certain health   conditions, such as acid reflux, environmental allergies, asthma, or even a high amount of   stress.  I provide a perceptual-acoustic speech/voice evaluation, including measurements of your exact   habitual pitch and pitch range. I can help you identify your existing communication profile, and   determine what aspects can be changed or utilized more. In training sessions, I use my trained   ear, pitch software, video, and/or a simulated piano keyboard to give you the critical immediate   feedback you need to learn new techniques and put them into practice. As a health   professional, I take a serious approach to vocal health by considering your overall health, vocal   load, and vocal habits, and I sometimes perform videostroboscopy to rule out any vocal fold   problems that may or may not affect training.    Ernestine Navas MM (voice), MA, CCC-SLP   she/her  Certificate of Vocology  [facility]  (adapted from [facility] voice and speech lab      Apps to consider downloading on your smart phone for therapy:     Voice Analyst [employer] ~$13.00    Perfect Piano  - FREE!!!      List of Hz vs. Pitch: [email or web address]      Please also bring a notebook to take your notes during our appointment-thanks!       To Do: Please record with the Blue passage and sentences (send to me, too if you want/ comfortable)  \"Ah\" for 10 sec  \"eee\" for 10 sec  glide from lowest to highest and back down              Additional information:    Trans voice lessons: Pitch and " "resonance  https://www.youtube.com/watch?v=21ZfGPp-Ves    Finding a voice - Ernestine  https://www.youtube.com/watch?v=a6OLpAqq4nE     Use the \"perceptions\" handout to help think of goals for your voice (see below)     [facility] biweekly appts; ask for wait list, too  [phone]               If you are not contacted, you may try [facility] biweekly appts; ask for wait list, too  [phone]    Let me know if you have questions! -Ernestine         Cup and Bubble Exercises  - Hum or Zzzz on B3  WHY: Though these exercises seems (and feels) silly they are helpful for a number of reasons. First, they make you use your air generously and consistently, helping you to coordinate your breath and your voice. Second, they lengthen and narrow the vocal tract with the straw. This narrowing (or semi-occlusion in scientific terms) creates back pressure in your throat which has been shown to help the vocal folds vibrate more easily and reduce how hard some of the other muscles are squeezing.   HOW:   With Water - Fill the cup (or bottle) about 2 inches full of water. Blow bubbles through the straw while keeping your voice on. (kind of like making an \"ooooo\" sound through straw).Keep the water bubbling the whole time. That means your air is moving consistently.   Without Water - make sound through the straw (like it's a kazoo). Make sure you are using your air freely. You can hold your hand in front of the straw to test, and you should be able to feel the air moving.   Use the \"w\" sound without the straw. Let your cheeks puff up slightly (like Reginald Randhawa). Maintain the relaxed feeling in your throat while focusing on a sense of buzz at your lips.   After easy sounds listed below speak through the straw (with or without water) using every day phrases and counting to help bridge between the exercises and everyday voice use.   Feel how open and relaxed your throat feels when you practice these sounds. If the " bubbling or air stops or it gets tight, don't sweat it. Just breath, relax, and start again. Across all the exercises make sure you are getting a nice low breath and feeling the steady inward motion of low abdominal muscles when making sound.   Practice 5 times a day for no more than 3 minutes, and whenever you feel fatigued.   Aren't sure if you are doing it right? Ask yourself these three questions:   1. Does it feel easy?   2. Are the bubbles and voice consistent?   3. Do you feel that forward buzz?     What sounds to make:   Single pitches - use any comfortable pitch and sustain the note for as long as it feels free and easy, and your lips or tongue are bubbling.   Sighs - glide from high to low like you are sitting down in a comfortable chair after a long day of work   Deposit - Start on a medium pitch and glide up just a bit and back down   On and off - use a comfortable pitch near where you speak. Keep the bubbles moving consistently while turning the voice on and off. Recognize how little work you need to do to get the voice working.     1) B3  2) B3-G3  3) 1-3-1          3-1 (B-G3)

## 2024-03-05 NOTE — LETTER
3/5/2024       RE: Kd Slater  [address] [facility] [address]     Dear Colleague,    Thank you for referring your patient, Kd Slater, to the [facility] [place] at [facility]. Please see a copy of my visit note below.        [facility]  [facility] - Dept. of Otolaryngology   Evaluation report - IN PERSON APPOINTMENT    Clinician: Ernestine Navas M.M. (voice), M.A., CCC-SLP  Referring physician:  Pat  Patient: Dav Slater  Date of Visit: 3/5/2024    HISTORY    Chief complaint: Bryon is a 28 year old presenting today for evaluation of voice and resonance disorder and dysphonia in the context of gender dysphoria        CURRENT SYMPTOMS INCLUDE  VOICE:   Would like voice to be less masculine  Needs more structure  Has not worked a lot with the voice just yet.   Voice is inconsistent can be all over  If she doesn't talk for a while the voice can become unstable in pitch  No fatigue by the end of the day  Talking with people  - little above normal. Checking pt in   Singing around the house, for fun.     THROAT ISSUES:   Lots of throat clearing  Usually constant  Feels a lump in the throat as well.   Worse when smoking    RESPIRATION:   Allergies - seasonal  No asthma  Both in terms of smoking - daily, but less than 6 per day    SWALLOWING:   No issues    ADDITIONAL:  Reflux: GERD - changed diet    OTHER PERTINENT HISTORY    Past Medical History:   Diagnosis Date     Pneumonia, organism unspecified(486)      No past surgical history on file.    OBJECTIVE  PATIENT REPORTED MEASURES  86/120  Feminine  masculine    Patient Supplied Answers To VHI Questionnaire       No data to display                Patient Supplied Answers To CSI Questionnaire       No data to display                Patient Supplied Answers To EAT Questionnaire       No data to display                    PERCEPTUAL  EVALUATION (CPT 81283)  POSTURE / TENSION/ PALPATION OF THE LARYNGEAL AREA:   upper body    BREATHING:   clavicular elevation on inspiration    LARYNGEAL PALPATION:   firm musculature    VOICE:  Bryon states that today is a typical voice today, with clinician observing voice quality to be characterized as:  Roughness: WNL  Breathiness: WNL  Strain: WNL  Pitch:  F0/Habitual/Conversational speech: informally judged as WNL  Voiced /i/ Max Phonation Time: informally judged as WNL  Voiceless /s/ max: informally judged as WNL  Pitch glide: neurologically normal  Maximum Phonation Time: informally judged as WNL  Resonance:   Conversational speech:  laryngeal pharyngeal resonance  Loudness  Conversational speech:  WNL  GLOBAL ASSESSMENT OF DYSPHONIA: 30/100    COUGH/THROAT CLEARING:  Not observed    LARYNGEAL FUNCTION STUDIES (CPT 14837)  /a/ mean f0 = 143.202 Hz (SD = 1.453)  /i/ mean f0 = 142.765 Hz (SD = 1.098)  Glide:     Lowest f0 = 79.450 Hz      Higest f0 = 1470.384 Hz      Range = 1390.934 Hz   Connected Speech     Mean f0 = 127.121 Hz (SD 25.977)     Median f0 = 121.308 Hz      Lowest f0 = 100.921 Hz      Highest f0 = 436.703 Hz      Speaking Range = 335.782 Hz         LARYNGEAL EXAMINATION  Laryngeal exam was discussed but determined not to be warranted today.    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    Remarkable findings included:  Perceptual evaluation demonstrated:   Roughness: WNL  Breathiness: WNL  Strain: WNL  Pitch:  F0/Habitual/Conversational speech: informally judged as WNL  Laryngeal exam demonstrated: Not warranted  Primary complaint of patient today included: Seeking of voice and that is comfortable, authentic, and safe.    Additional  assessment based discussion included:  I encouragedDav to listen to the body and take breaks, as needed.     Therefore, we recommended a course of speech therapy to address these concerns.    RECOMMENDATIONS:   A course of speech therapy is recommended to optimize vocal technique, improve voice quality, and promote reduced discomfort, effort and fatigue.  She demonstrates a Good prognosis for improvement given adherence to therapeutic recommendations.   Positive indicators: diagnosis is known to respond to treatment high level of comittment  Negative indicators: none  DURATION / FREQUENCY: 4 one-hour sessions.  A total of 6-8 sessions may be necessary.    GOALS:  Patient goal:   To improve and maintain a healthy voice quality  To understand the problem and fix it as much as possible    Short-term goal(s): Within the first 4 sessions, [place]:  will utilize silent inhalation with good low-respiratory engagement 75% of the time during therapy tasks with minimal clinician support  will demonstrate semi-occluded vocal tract (SOVT) exercises with at least 80% accuracy with no clinician support  will accurately identify target vs. habitual voice quality during therapy tasks in 4 out of 5 trials with no clinician support    Long-term goal(s): In 6 months, Ms. Slater will:  Report resolution of dysphonia, and use of optimal voice quality to meet personal, social, and professional needs, 90% of the time during a typical week of vocal activities    Certification period:   Certification date from: 3/05/24  Certification date to: 6/3/24    This treatment plan was developed with the patient who agreed with the recommendations.    _______________________________________________________________________  THERAPY NOTE (CPT 01019)  Date of Service: 3/5/2024    SUBJECTIVE / OBJECTIVE:  Please refer to my evaluation report from today's encounter for full details regarding subjective data, patient reported measures, and  "diagnostic findings.    THERAPEUTIC ACTIVITIES  Semi-Occluded Vocal Tract (SOVT) exercises instructed to reduce laryngeal tension, promote vocal fold pliability, and coordinate respiration and phonation  Straw phonation with water resistance was found to be most facilitating   Modal B3  Sustained phonation, and voice vs. voiceless productions used to promote easy voicing and raise awareness of laryngeal tension  Ascending and descending glides utilized to promote vocal fold pliability  \"Messa di voce\", gradual crescendo and decrescendo to vary medial compression was also utilized to promote vocal fold pliability.  Also 3-1 and 1-3-1.  Instructed on the benefits of using these exercises for improved coordination of breath flow with phonation and tissue mobilization.  Instructed on the importance of using these exercises as a warm-up / cool down,  and to re-calibrate the voice throughout the day.      Exercises in techniques for improved airflow during phonation  Speech material with /ju/ glides (3-1)was facilitating at the word level.  Progressed to easy onset/ flow, and blending phrases  Instructed with a descending 5th, as well as an arpeggio pattern; this was helpful.  Estelle techniques to reduce glottal moncada and improve breath flow; negative practice improved awareness today.    Counseling and Education  Asked many questions about the nature of her symptoms, and I answered all of these thoroughly.  Concepts of an optimal regimen for practice were instructed.  She should use an interval schedule of practice, with brief periods of practice frequently throughout each day  Estelle concepts of volitional practice to facilitate motor learning.  A revised regimen for home practice was instructed.  I provided an audio recording and handouts of today's therapeutic activities to facilitate practice.    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  Ms. Slater demonstrated good early learning today    PLAN: I will see Ms. Slater TBD. She is scheduling today      TOTAL SERVICE TIME: 60 minutes  EVALUATION OF VOICE AND RESONANCE (15288)  TREATMENT (54194)  LARYNGEAL FUNCTION STUDIES (03587) +52 mod.  NO CHARGE FACILITY FEE (72814)      Ernestine Navas M.M. (voice), M.A., CCC/SLP  Speech-Language Pathologist  SVI Trained Vocologist  [facility] Voice [facility]  [phone]  [email or web address]  Pronouns: she/her      *this report was created in part through the use of computerized dictation software, and though reviewed following completion, some typographic errors may persist.  If there is confusion regarding any of this notes contents, please contact me for clarification                                                                                         Outpatient Speech Language Therapy Evaluation  PLAN OF TREATMENT FOR OUTPATIENT REHABILITATION  (COMPLETE FOR INITIAL CLAIMS ONLY)  Patient's Last Name, First Name, M.I.  YOB: 1995  Kd Slater                        Provider's Name  Ernestine Navas, SLP Medical Record No.  [id number]                               Onset Date:  3/05/24   Start of Care Date: 3/05/24     Type: Speech Language Therapy Medical Diagnosis: No primary diagnosis found.                        Therapy Diagnosis:  No primary diagnosis found.   Visits from SOC:  1   _________________________________________________________________________________  Plan of Treatment:   Speech therapy    DURATION/FREQUENCY OF TREATMENT  Six weekly, one-hour sessions, with two monthly one-hour follow-up sessions    PROGNOSIS  Good prognosis for voice improvement with speech therapy and regular practice of  therapeutic activities.    BARRIERS TO LEARNING/TEACHING AND LEARNING NEEDS  None/Unremarkable    GOALS:  Patient goal:   To improve and maintain a healthy voice quality  To understand the problem and fix it as much as possible    Short-term goal(s): Within the first 4 sessions, Bryon:  will utilize silent inhalation with good low-respiratory engagement 75% of the time during therapy tasks with minimal clinician support  will demonstrate semi-occluded vocal tract (SOVT) exercises with at least 80% accuracy with no clinician support  will accurately identify target vs. habitual voice quality during therapy tasks in 4 out of 5 trials with no clinician support    Long-term goal(s): In 6 months, Ms. Slater will:  Report resolution of dysphonia, and use of optimal voice quality to meet personal, social, and professional needs, 90% of the time during a typical week of vocal activities  _________________________________________________________________________________    I CERTIFY THE NEED FOR THESE SERVICES FURNISHED UNDER        THIS PLAN OF TREATMENT AND WHILE UNDER MY CARE     (Physician attestation of this document indicates review and certification of the therapy plan).     Certification date from: 3/05/24  Certification date to: 6/3/24    Referring Provider: Tenisha Stewart         Again, thank you for allowing me to participate in the care of your patient.      Sincerely,    Ernestine Navas, SLP

## 2024-03-11 ENCOUNTER — OFFICE VISIT (OUTPATIENT)
Dept: PSYCHOLOGY | Facility: CLINIC | Age: 29
End: 2024-03-11
Payer: COMMERCIAL

## 2024-03-11 DIAGNOSIS — F64.0 GENDER DYSPHORIA IN ADULT: Primary | ICD-10-CM

## 2024-03-11 DIAGNOSIS — F33.3 MAJOR DEPRESSIVE DISORDER, RECURRENT, SEVERE WITH PSYCHOTIC FEATURES (H): ICD-10-CM

## 2024-03-11 PROCEDURE — 90853 GROUP PSYCHOTHERAPY: CPT | Performed by: PSYCHOLOGIST

## 2024-03-11 NOTE — PROGRESS NOTES
"    [facility] - Progress Note    Date of Service: 3/11/24   Name: Kd Reyes)  : 1995  Medical Record Number: [id number]  Treating Provider: Candi Mera, PhD   Type of Session: group  Present in Session: Client  Type of Session: Group  Number of Minutes: 120 mins with group members present  Therapist(s): Ernestine Sanders, Doctoral Psychology Intern (PsFiliberto) and Candi Mera, PhD,      Video start time: 6:30 PM  Video end time: 8:30 PM     SERVICE MODALITY:  In Person      DSM-5 Diagnoses:  F64.0 - Gender Dysphoria in Adolescent and Adult   296.33 Recurrent Major depression      Current Reported Symptoms and Status update:  Experiences gender dysphoria;  ups and down with mood and self-perception, experiences paranoia and struggles in interpersonal relationships.      Changes since last session: Reported last week was bad, increase in SI, struggling with feelings more than her thoughts, experienced a recent episode that felt like a flashback, and navigating trauma.    Changes since last session:  more clear about who she is and who she wants to be and is not allowing others to \"get to me so much\";  feeling in her body again      Progress Toward Treatment Goals:   Satisfactory      Therapeutic Interventions/Treatment Strategies:     Area(s) of treatment focus addressed in this session included Symptom Management, Interpersonal Relationship Skills, Gender Health and Wellness       Cognitive behavioral, interpersonal, and group therapy interventions were utilized to explore gender dysphoria (i.e., transition, interpersonal relationships, and medical interventions) and mental health symptoms, specifically depression and anxiety.  Dav  shared about the changes she is experiencing that are very positive and outlined above. Recognition that she felt that she did not have any self-worth and after spending time with self, felt much better.  Did not ask for own time in group but very " "much responded to another person's share. Was able to relate to \"it is better to be safe than to be vulnerable.\"  Did a great job connecting to another group member.     Treatment modalities used include [facility] THERAPY INTERVENTIONS: Motivational Interviewing Group Dynamic Therapy  Behavioral Activation: Explored how behaviors effect mood and interact with thoughts and feelings;  Promoting gender health and resiliency.      Patient Response:   Patient responded to session by listening and actively engaged.      Possible barriers to participation / learning include: some self-described paranoia      Current Mental Status Exam:   Appearance:  Appropriate   Eye Contact:  Good   Attitude / Demeanor: Cooperative and engaged  Speech      Rate / Production: Normal/ Responsive      Volume:  Normal  volume  Orientation:  All  Mood:   Anxious and activated   Affect:   Appropriate   Thought Content: Clear   Insight:   Good      Plan/Need for Future Services:  Return for group therapy in 2 weeks to treat diagnosed problems.    Patient has a current master individualized treatment plan.  See Epic treatment plan for more information.     Referral / Collaboration:  Referral to another professional/service is not indicated at this time.  Emergency Services Needed?  No     Assignment:  none     Interactive Complexity:  There are four specific communication difficulties that complicate the work of the primary psychiatric procedure.  Interactive complexity (+17881) may be reported when at least one of these difficulties is present.     Communication difficulties present during current the psychiatric procedure include:  None.          Candi Mera, PhD  Licensed Psychologist       "

## 2024-03-12 ENCOUNTER — VIRTUAL VISIT (OUTPATIENT)
Dept: PSYCHOLOGY | Facility: CLINIC | Age: 29
End: 2024-03-12
Payer: COMMERCIAL

## 2024-03-12 DIAGNOSIS — F41.1 GENERALIZED ANXIETY DISORDER: Primary | ICD-10-CM

## 2024-03-12 DIAGNOSIS — F33.3 MAJOR DEPRESSIVE DISORDER, RECURRENT, SEVERE WITH PSYCHOTIC FEATURES (H): ICD-10-CM

## 2024-03-12 DIAGNOSIS — F64.0 GENDER DYSPHORIA IN ADULT: ICD-10-CM

## 2024-03-12 PROCEDURE — 90837 PSYTX W PT 60 MINUTES: CPT | Mod: 95 | Performed by: MARRIAGE & FAMILY THERAPIST

## 2024-03-12 NOTE — PROGRESS NOTES
[facility] - Progress Note    Date of Service: 3/12/24   Name: Kd Slater  : 1995  Medical Record Number: [id number]  Treating Provider: Tenisha Stewart PsyD  Type of Session: Individual  Present in Session: client  Session Start and Stop Time: 4-4:55  Number of Minutes:  55    SERVICE MODALITY:  Video Visit:      Provider verified identity through the following two step process.  Patient provided:  Patient was verified at admission/transfer    Telemedicine Visit: The patient's condition can be safely assessed and treated via synchronous audio and visual telemedicine encounter.      Reason for Telemedicine Visit: Patient convenience (e.g. access to timely appointments / distance to available provider)    Originating Site (Patient Location): Patient's home    Distant Site (Provider Location): [facility]    Consent:  The patient/guardian has verbally consented to: the potential risks and benefits of telemedicine (video visit) versus in person care; bill my insurance or make self-payment for services provided; and responsibility for payment of non-covered services.     Patient would like the video invitation sent by:  My Chart    Mode of Communication:  Video Conference via [facility]    Distant Location (Provider):  On-site    As the provider I attest to compliance with applicable laws and regulations related to telemedicine.    DSM-5 Diagnoses:  Encounter Diagnoses   Name Primary?     Gender dysphoria in adult      Major depressive disorder, recurrent, severe with psychotic features (H)      Generalized anxiety disorder Yes       Current Reported Symptoms and Status update:  Changes since last session client reports continued dysphoria, interpersonal stress, discrimination at workplace. Anxiety, rumination, worry about job.     Progress Toward Treatment Goals:   No improvement     Therapeutic Interventions/Treatment Strategies:    Area(s)  of treatment focus addressed in this session included Symptom Management, Personal Safety/Harm Reduction, Interpersonal Relationship Skills and Gender Health    Psychotherapist offered support, feedback and validation, provided redirection and reinforced use of skills Treatment modalities used include Motivational Interviewing Narrative Therapy Gender Affirming Care Symptoms Management: Promoted understanding of their diagnoses and how it impacts their functioning, Emotions Management:  Increased awareness of daily mood patterns/changes, Other: Assisted patient  in finding ways to adapt functioning in light of past traumatic experiences and Explored with patient how life transitions may impact mental health and functioning  and Relationship Skills: Encouraged development and maintenance  of healthy boundaries  Support, Redirection and Feedback    Patient Response:   Patient responded to session by verbalizing understanding and actively engaged  Possible barriers to participation / learning include: N/A    Current Mental Status Exam:   Appearance:  Appropriate   Eye Contact:  Good   Attitude / Demeanor: Cooperative   Speech      Rate / Production: Normal/ Responsive      Volume:  Normal  volume  Orientation:  All  Mood:   Normal  Affect:   Appropriate   Thought Content: Clear   Insight:   Good       Plan/Need for Future Services:  Return for therapy in 1-2 weeks to treat diagnosed problems.    Patient has a current master individualized treatment plan.  See Epic treatment plan for more information.    Referral / Collaboration:  Referral to another professional/service is not indicated at this time..  Emergency Services Needed?  No    Assignment:  None    Interactive Complexity:  There are four specific communication difficulties that complicate the work of the primary psychiatric procedure.  Interactive complexity (+04672) may be reported when at least one of these difficulties is present.    Communication  difficulties present during current the psychiatric procedure include:  1. None.      Signature/Title:    Tenisha Stewart PsyD

## 2024-03-12 NOTE — PROGRESS NOTES
Outpatient Speech Language Therapy Evaluation  PLAN OF TREATMENT FOR OUTPATIENT REHABILITATION  (COMPLETE FOR INITIAL CLAIMS ONLY)  Patient's Last Name, First Name, M.I.  YOB: 1995  Kd Slater                        Provider's Name  Ernestine DOBBS , SLP Medical Record No.  [id number]                               Onset Date:  3/05/24   Start of Care Date: 3/05/24     Type: Speech Language Therapy Medical Diagnosis: No primary diagnosis found.                        Therapy Diagnosis:  No primary diagnosis found.   Visits from SOC:  1   _________________________________________________________________________________  Plan of Treatment:   Speech therapy    DURATION/FREQUENCY OF TREATMENT  Six weekly, one-hour sessions, with two monthly one-hour follow-up sessions    PROGNOSIS  Good prognosis for voice improvement with speech therapy and regular practice of therapeutic activities.    BARRIERS TO LEARNING/TEACHING AND LEARNING NEEDS  None/Unremarkable    GOALS:  Patient goal:   To improve and maintain a healthy voice quality  To understand the problem and fix it as much as possible    Short-term goal(s): Within the first 4 sessions, Bryon:  will utilize silent inhalation with good low-respiratory engagement 75% of the time during therapy tasks with minimal clinician support  will demonstrate semi-occluded vocal tract (SOVT) exercises with at least 80% accuracy with no clinician support  will accurately identify target vs. habitual voice quality during therapy tasks in 4 out of 5 trials with no clinician support    Long-term goal(s): In 6 months, Ms. Slater will:  Report resolution of dysphonia, and use of optimal voice quality to meet personal, social, and professional needs, 90% of the time during a typical week of vocal  activities  _________________________________________________________________________________    I CERTIFY THE NEED FOR THESE SERVICES FURNISHED UNDER        THIS PLAN OF TREATMENT AND WHILE UNDER MY CARE     (Physician attestation of this document indicates review and certification of the therapy plan).     Certification date from: 3/05/24  Certification date to: 6/3/24    Referring Provider: Tenisha Stewart

## 2024-03-12 NOTE — NURSING NOTE
Is the patient currently in the state of [place]? YES    Visit mode:VIDEO    If the visit is dropped, the patient can be reconnected by: VIDEO VISIT: Send to e-mail at: [email or web address]    Will anyone else be joining the visit? NO  (If patient encounters technical issues they should call [phone]    How would you like to obtain your AVS? MyChart    Are changes needed to the allergy or medication list? No    Reason for visit: RECHNAPOLEON ALFARO

## 2024-03-25 ENCOUNTER — OFFICE VISIT (OUTPATIENT)
Dept: PSYCHOLOGY | Facility: CLINIC | Age: 29
End: 2024-03-25
Payer: COMMERCIAL

## 2024-03-25 DIAGNOSIS — F33.3 MAJOR DEPRESSIVE DISORDER, RECURRENT, SEVERE WITH PSYCHOTIC FEATURES (H): ICD-10-CM

## 2024-03-25 DIAGNOSIS — F64.0 GENDER DYSPHORIA IN ADULT: Primary | ICD-10-CM

## 2024-03-25 PROCEDURE — 90853 GROUP PSYCHOTHERAPY: CPT | Mod: HN

## 2024-03-25 NOTE — PROGRESS NOTES
"    [facility] Progress [facility]    Date of Service: 3/25/24   Name: Kd Reyes)  : 1995  Medical Record Number: [id number]  Treating Provider: Candi Mera, PhD   Type of Session: group  Present in Session: Client  Type of Session: Group  Number of Minutes: 120 mins with group members present  Therapist(s): Ernestine Sanders, Doctoral Psychology Intern (PsFiliberto) and Candi Mera, PhD,      Video start time: 6:30 PM  Video end time: 8:30 PM     SERVICE MODALITY:  In Person      DSM-5 Diagnoses:  F64.0 - Gender Dysphoria in Adolescent and Adult   296.33 Recurrent Major depression      Current Reported Symptoms and Status update:  Experiences gender dysphoria;  ups and down with mood and self-perception, experiences paranoia and struggles in interpersonal relationships.      Changes since last session: Reported last week was bad, increase in SI, struggling with feelings more than her thoughts, experienced a recent episode that felt like a flashback, and navigating trauma.    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    Progress Toward Treatment Goals:   Satisfactory      Therapeutic Interventions/Treatment Strategies:     Area(s) of treatment focus addressed in this session included Symptom Management, Interpersonal Relationship Skills, Gender Health and Wellness       Cognitive behavioral, interpersonal, and group therapy interventions were utilized to explore gender dysphoria (i.e., transition, interpersonal relationships, and medical interventions) and mental health symptoms, specifically depression and anxiety.  Dav took more time to share how work has been lately. Specifically, talked about the \"push-pull\" she feels externally and internally between paranoia and working in a " "non-affirming environment. Work has requested to a get a third party involved, making her feel validated that systems care about her as a person. She is working towards trusting herself and gained insight into how childhood negative events may activate her paranoia.    Treatment modalities used include [facility] THERAPY INTERVENTIONS: Motivational Interviewing Group Dynamic Therapy  Behavioral Activation: Explored how behaviors effect mood and interact with thoughts and feelings;  Promoting gender health and resiliency.      Patient Response:   Patient responded to session by listening and actively engaged.      Possible barriers to participation / learning include: some self-described paranoia      Current Mental Status Exam:   Appearance:  Appropriate   Eye Contact:  Good   Attitude / Demeanor: Cooperative and engaged  Speech      Rate / Production: Normal/ Responsive      Volume:  Normal  volume  Orientation:  All  Mood:   Anxious and Activated (\"really bad, work was really bad\")  Affect:   Appropriate   Thought Content: Clear   Insight:   Good      Plan/Need for Future Services:  Return for group therapy in 2 weeks to treat diagnosed problems.    Patient has a current master individualized treatment plan.  See Epic treatment plan for more information.     Referral / Collaboration:  Referral to another professional/service is not indicated at this time.  Emergency Services Needed?  No     Assignment:  none     Interactive Complexity:  There are four specific communication difficulties that complicate the work of the primary psychiatric procedure.  Interactive complexity (+32704) may be reported when at least one of these difficulties is present.     Communication difficulties present during current the psychiatric procedure include:  None.       Ernestine Sanders, Doctoral Psychology Intern (PsyD)  "

## 2024-03-26 ENCOUNTER — VIRTUAL VISIT (OUTPATIENT)
Dept: PSYCHOLOGY | Facility: CLINIC | Age: 29
End: 2024-03-26
Payer: COMMERCIAL

## 2024-03-26 DIAGNOSIS — F41.1 GENERALIZED ANXIETY DISORDER: ICD-10-CM

## 2024-03-26 DIAGNOSIS — F33.3 MAJOR DEPRESSIVE DISORDER, RECURRENT, SEVERE WITH PSYCHOTIC FEATURES (H): ICD-10-CM

## 2024-03-26 DIAGNOSIS — F64.0 GENDER DYSPHORIA IN ADULT: Primary | ICD-10-CM

## 2024-03-26 PROCEDURE — 90837 PSYTX W PT 60 MINUTES: CPT | Mod: 95 | Performed by: MARRIAGE & FAMILY THERAPIST

## 2024-03-26 NOTE — NURSING NOTE
Is the patient currently in the state of [place]? YES    Visit mode:VIDEO    If the visit is dropped, the patient can be reconnected by: VIDEO VISIT: Text to cell phone:   Telephone Information:   Mobile [phone]       Will anyone else be joining the visit? NO  (If patient encounters technical issues they should call [phone]    How would you like to obtain your AVS? MyChart    Are changes needed to the allergy or medication list? N/A    Reason for visit: DIEGO ALFARO

## 2024-03-26 NOTE — PROGRESS NOTES
"  [place] [facility] - Progress Note    Date of Service: 3/26/24   Name: Kd BLEVINS \"Dav\" Bryon  : 1995  Medical Record Number: [id number]  Treating Provider: Tenisha Stewart PsyD   Type of Session: Individual  Present in Session: Client  Session Start and Stop Time: 12:04-1:00  Number of Minutes: 56    SERVICE MODALITY:  Video Visit:      Provider verified identity through the following two step process.  Patient provided:  Patient was verified at admission/transfer    Telemedicine Visit: The patient's condition can be safely assessed and treated via synchronous audio and visual telemedicine encounter.      Reason for Telemedicine Visit: Patient convenience (e.g. access to timely appointments / distance to available provider)    Originating Site (Patient Location): Patient's home    Distant Site (Provider Location): [facility]    Consent:  The patient/guardian has verbally consented to: the potential risks and benefits of telemedicine (video visit) versus in person care; bill my insurance or make self-payment for services provided; and responsibility for payment of non-covered services.     Patient would like the video invitation sent by:  My Chart    Mode of Communication:  Video Conference via [employer]    Distant Location (Provider):  On-site    As the provider I attest to compliance with applicable laws and regulations related to telemedicine.    DSM-5 Diagnoses:  Encounter Diagnoses   Name Primary?     Gender dysphoria in adult Yes     Major depressive disorder, recurrent, severe with psychotic features (H)      Generalized anxiety disorder      Current Reported Symptoms and Status update:  Changes since last session- increased distress related to toxic workplace, dysphoria, questioning reality and depressed mood, hypervigilance, anxiety.     Progress Toward Treatment Goals:   Minimal progress     Therapeutic Interventions/Treatment " Strategies:    Area(s) of treatment focus addressed in this session included Symptom Management, Personal Safety/Harm Reduction, Interpersonal Relationship Skills and Gender Health    Psychotherapist offered support, feedback and validation, provided redirection and reinforced use of skills Treatment modalities used include Narrative Therapy Gender Affirming Care Interpersonal Radical Healing  Symptoms Management: Promoted understanding of their diagnoses and how it impacts their functioning, Emotions Management:  Increased awareness of daily mood patterns/changes, Other: Assisted patient  in finding ways to adapt functioning in light of past traumatic experiences and Explored with patient how life transitions may impact mental health and functioning  and Relationship Skills: Assisted patients in implementing more effective communication skills in their relationships and Encouraged development and maintenance  of healthy boundaries  Support, Redirection and Feedback    Patient Response:   Patient responded to session by verbalizing understanding and actively engaged  Possible barriers to participation / learning include: N/A    Current Mental Status Exam:   Appearance:  Appropriate   Eye Contact:  Good   Attitude / Demeanor: Cooperative   Speech      Rate / Production: Normal/ Responsive      Volume:  Normal  volume  Orientation:  All  Mood:   Anxious  Depressed   Affect:   Appropriate   Thought Content: Clear   Insight:   Good       Plan/Need for Future Services:  Return for therapy in 2 weeks to treat diagnosed problems.    Patient has a current master individualized treatment plan.  See Epic treatment plan for more information.    Referral / Collaboration:  Referral to another professional/service is not indicated at this time..  Emergency Services Needed?  No    Assignment:  None    Interactive Complexity:  There are four specific communication difficulties that complicate the work of the primary psychiatric  procedure.  Interactive complexity (+99336) may be reported when at least one of these difficulties is present.    Communication difficulties present during current the psychiatric procedure include:  1. None.      Signature/Title:    Tenisha Stewart PsyD

## 2024-04-01 ENCOUNTER — VIRTUAL VISIT (OUTPATIENT)
Dept: PSYCHOLOGY | Facility: CLINIC | Age: 29
End: 2024-04-01
Payer: COMMERCIAL

## 2024-04-01 DIAGNOSIS — F41.1 GENERALIZED ANXIETY DISORDER: ICD-10-CM

## 2024-04-01 DIAGNOSIS — F64.0 GENDER DYSPHORIA IN ADULT: ICD-10-CM

## 2024-04-01 DIAGNOSIS — F33.3 MAJOR DEPRESSIVE DISORDER, RECURRENT, SEVERE WITH PSYCHOTIC FEATURES (H): Primary | ICD-10-CM

## 2024-04-01 PROCEDURE — 90837 PSYTX W PT 60 MINUTES: CPT | Mod: 95 | Performed by: MARRIAGE & FAMILY THERAPIST

## 2024-04-01 NOTE — NURSING NOTE
Is the patient currently in the state of [place]? YES    Visit mode:VIDEO    If the visit is dropped, the patient can be reconnected by: VIDEO VISIT: Send to e-mail at: [email or web address]    Will anyone else be joining the visit? NO  (If patient encounters technical issues they should call [phone])    How would you like to obtain your AVS? MyChart    Are changes needed to the allergy or medication list? N/A    Are refills needed on medications prescribed by this physician? N/A    Reason for visit: DIEGO ALFARO

## 2024-04-01 NOTE — PROGRESS NOTES
[place] [facility] - Progress Note    Date of Service: 24   Name: Dav Slater  : 1995  Medical Record Number: [id number]  Treating Provider: Tenisha Bhagat PsyD   Type of Session: Individual  Present in Session: Client  Session Start and Stop Time: 3:00-3:55  Number of Minutes:  55    SERVICE MODALITY:  Video Visit:      Provider verified identity through the following two step process.  Patient provided:  Patient was verified at admission/transfer    Telemedicine Visit: The patient's condition can be safely assessed and treated via synchronous audio and visual telemedicine encounter.      Reason for Telemedicine Visit: Patient convenience (e.g. access to timely appointments / distance to available provider)    Originating Site (Patient Location): Patient's home    Distant Site (Provider Location): [facility]    Consent:  The patient/guardian has verbally consented to: the potential risks and benefits of telemedicine (video visit) versus in person care; bill my insurance or make self-payment for services provided; and responsibility for payment of non-covered services.     Patient would like the video invitation sent by:  My Chart    Mode of Communication:  Video Conference via [employer]    Distant Location (Provider):  On-site    As the provider I attest to compliance with applicable laws and regulations related to telemedicine.    DSM-5 Diagnoses:  Encounter Diagnoses   Name Primary?     Gender dysphoria in adult      Major depressive disorder, recurrent, severe with psychotic features (H) Yes     Generalized anxiety disorder      Current Reported Symptoms and Status update:  Changes since last session- client reports continued toxic work environment, feeling stress, questioning reality, gender dysphoria, anxiety.     Progress Toward Treatment Goals:   Minimal progress     Therapeutic Interventions/Treatment Strategies:    Area(s) of treatment  "focus addressed in this session included Interpersonal Relationship Skills, Sexual Health and Wellness and Gender Health    Psychotherapist offered support, feedback and validation, provided redirection and reinforced use of skills Treatment modalities used include Narrative Therapy Systemic Relational Therapy Gender Affirming Care Interpersonal Coping Skills: Discussed how the use of intentional \"in the moment\" actions can help reduce distress and Promoted understanding of how and when to apply grounding strategies to reduce distress and increase presence in the moment, Other: Explored with patient how life transitions may impact mental health and functioning  and Relationship Skills: Assisted patients in implementing more effective communication skills in their relationships, Encouraged development and maintenance  of healthy boundaries and Discussed strategies to promote healthier understanding of interpersonal relationships  Support, Redirection and Feedback    Patient Response:   Patient responded to session by verbalizing understanding and actively engaged  Possible barriers to participation / learning include: N/A    Current Mental Status Exam:   Appearance:  Appropriate   Eye Contact:  Good   Attitude / Demeanor: Cooperative   Speech      Rate / Production: Normal/ Responsive      Volume:  Normal  volume  Orientation:  All  Mood:   Normal  Affect:   Appropriate   Thought Content: Clear   Insight:   Good       Plan/Need for Future Services:  Return for therapy in 2 weeks to treat diagnosed problems.    Patient has a current master individualized treatment plan.  See Epic treatment plan for more information.    Referral / Collaboration:  Referral to another professional/service is not indicated at this time..  Emergency Services Needed?  No    Assignment:  None    Interactive Complexity:  There are four specific communication difficulties that complicate the work of the primary psychiatric procedure.  " Interactive complexity (+28832) may be reported when at least one of these difficulties is present.    Communication difficulties present during current the psychiatric procedure include:  1. None.      Signature/Title:    Tenisha Stewart PsyD

## 2024-04-08 ENCOUNTER — OFFICE VISIT (OUTPATIENT)
Dept: PSYCHOLOGY | Facility: CLINIC | Age: 29
End: 2024-04-08
Payer: COMMERCIAL

## 2024-04-08 DIAGNOSIS — F33.3 MAJOR DEPRESSIVE DISORDER, RECURRENT, SEVERE WITH PSYCHOTIC FEATURES (H): ICD-10-CM

## 2024-04-08 DIAGNOSIS — F64.0 GENDER DYSPHORIA IN ADULT: Primary | ICD-10-CM

## 2024-04-08 PROCEDURE — 90853 GROUP PSYCHOTHERAPY: CPT | Performed by: PSYCHOLOGIST

## 2024-04-08 NOTE — PROGRESS NOTES
[place] [facility] - Progress Note    Date of Service: 24   Name: Kd Slater (Dav)  : 1995  Medical Record Number: [id number]  Treating Provider: Candi Mera, PhD   Type of Session: group  Present in Session: Client  Type of Session: Group  Number of Minutes: 120 mins with group members present  Therapist(s): Ernestine Sanders, Doctoral Psychology Intern (PsFiliberto) (absent) and Candi Mera, PhD,      Video start time: 6:30 PM  Video end time: 8:30 PM     SERVICE MODALITY:  In Person      DSM-5 Diagnoses:  F64.0 - Gender Dysphoria in Adolescent and Adult   296.33 Recurrent Major depression      Current Reported Symptoms and Status update:  Experiences gender dysphoria;  ups and down with mood and self-perception, experiences paranoia and struggles in interpersonal relationships.      Changes since last session: Reported last week was bad, increase in SI, struggling with feelings more than her thoughts, experienced a recent episode that felt like a flashback, and navigating trauma.    Changes since last session: coping much better with the stressors from work.    Progress Toward Treatment Goals:   Satisfactory      Therapeutic Interventions/Treatment Strategies:     Area(s) of treatment focus addressed in this session included Symptom Management, Interpersonal Relationship Skills, Gender Health and Wellness       Cognitive behavioral, interpersonal, and group therapy interventions were utilized to explore gender dysphoria (i.e., transition, interpersonal relationships, and medical interventions) and mental health symptoms, specifically depression and anxiety.  Dav took more time to share how work has been lately. Dav did not choose to take time this group but did share that she is feeling much better than last group. Was involved in group process and provided supportive feedback to others.     Treatment modalities used include [facility] THERAPY INTERVENTIONS: Motivational Interviewing  Group Dynamic Therapy  Behavioral Activation: Explored how behaviors effect mood and interact with thoughts and feelings;  Promoting gender health and resiliency.      Patient Response:   Patient responded to session by listening and actively engaged.      Possible barriers to participation / learning include: some self-described paranoia      Current Mental Status Exam:   Appearance:  Appropriate   Eye Contact:  Good   Attitude / Demeanor: Cooperative and engaged  Speech      Rate / Production: Normal/ Responsive      Volume:  Normal  volume  Orientation:  All  Mood:   Good and some confidence  Affect:   Appropriate   Thought Content: Clear   Insight:   Good      Plan/Need for Future Services:  Return for group therapy in 2 weeks to treat diagnosed problems.    Patient has a current master individualized treatment plan.  See Epic treatment plan for more information.     Referral / Collaboration:  Referral to another professional/service is not indicated at this time.  Emergency Services Needed?  No     Assignment:  none     Interactive Complexity:  There are four specific communication difficulties that complicate the work of the primary psychiatric procedure.  Interactive complexity (+64941) may be reported when at least one of these difficulties is present.     Communication difficulties present during current the psychiatric procedure include:  None.         Candi Mera, PhD  Licensed Psychologist

## 2024-04-15 ENCOUNTER — VIRTUAL VISIT (OUTPATIENT)
Dept: PSYCHOLOGY | Facility: CLINIC | Age: 29
End: 2024-04-15
Payer: COMMERCIAL

## 2024-04-15 DIAGNOSIS — F41.1 GENERALIZED ANXIETY DISORDER: ICD-10-CM

## 2024-04-15 DIAGNOSIS — F64.0 GENDER DYSPHORIA IN ADULT: Primary | ICD-10-CM

## 2024-04-15 PROCEDURE — 90837 PSYTX W PT 60 MINUTES: CPT | Mod: 95 | Performed by: MARRIAGE & FAMILY THERAPIST

## 2024-04-15 ASSESSMENT — ANXIETY QUESTIONNAIRES
6. BECOMING EASILY ANNOYED OR IRRITABLE: NEARLY EVERY DAY
7. FEELING AFRAID AS IF SOMETHING AWFUL MIGHT HAPPEN: MORE THAN HALF THE DAYS
IF YOU CHECKED OFF ANY PROBLEMS ON THIS QUESTIONNAIRE, HOW DIFFICULT HAVE THESE PROBLEMS MADE IT FOR YOU TO DO YOUR WORK, TAKE CARE OF THINGS AT HOME, OR GET ALONG WITH OTHER PEOPLE: VERY DIFFICULT
4. TROUBLE RELAXING: MORE THAN HALF THE DAYS
GAD7 TOTAL SCORE: 15
5. BEING SO RESTLESS THAT IT IS HARD TO SIT STILL: MORE THAN HALF THE DAYS
8. IF YOU CHECKED OFF ANY PROBLEMS, HOW DIFFICULT HAVE THESE MADE IT FOR YOU TO DO YOUR WORK, TAKE CARE OF THINGS AT HOME, OR GET ALONG WITH OTHER PEOPLE?: VERY DIFFICULT
GAD7 TOTAL SCORE: 15
3. WORRYING TOO MUCH ABOUT DIFFERENT THINGS: MORE THAN HALF THE DAYS
1. FEELING NERVOUS, ANXIOUS, OR ON EDGE: MORE THAN HALF THE DAYS
2. NOT BEING ABLE TO STOP OR CONTROL WORRYING: MORE THAN HALF THE DAYS
7. FEELING AFRAID AS IF SOMETHING AWFUL MIGHT HAPPEN: MORE THAN HALF THE DAYS
GAD7 TOTAL SCORE: 15

## 2024-04-15 ASSESSMENT — PATIENT HEALTH QUESTIONNAIRE - PHQ9
SUM OF ALL RESPONSES TO PHQ QUESTIONS 1-9: 3
10. IF YOU CHECKED OFF ANY PROBLEMS, HOW DIFFICULT HAVE THESE PROBLEMS MADE IT FOR YOU TO DO YOUR WORK, TAKE CARE OF THINGS AT HOME, OR GET ALONG WITH OTHER PEOPLE: SOMEWHAT DIFFICULT
SUM OF ALL RESPONSES TO PHQ QUESTIONS 1-9: 3

## 2024-04-15 NOTE — PROGRESS NOTES
[place] [facility] - Progress Note    Date of Service: 4/15/24   Name: Dav Slater  : 1995  Medical Record Number: [id number]  Treating Provider: Tenisha Stewart PsyD   Type of Session: Individual  Present in Session: Client  Session Start and Stop Time: 3:06-4:00  Number of Minutes: 54    SERVICE MODALITY:  Video Visit:      Provider verified identity through the following two step process.  Patient provided:  Patient was verified at admission/transfer    Telemedicine Visit: The patient's condition can be safely assessed and treated via synchronous audio and visual telemedicine encounter.      Reason for Telemedicine Visit: Patient convenience (e.g. access to timely appointments / distance to available provider)    Originating Site (Patient Location): Patient's home    Distant Site (Provider Location): [facility]    Consent:  The patient/guardian has verbally consented to: the potential risks and benefits of telemedicine (video visit) versus in person care; bill my insurance or make self-payment for services provided; and responsibility for payment of non-covered services.     Patient would like the video invitation sent by:  My Chart    Mode of Communication:  Video Conference via [facility]    Distant Location (Provider):  On-site    As the provider I attest to compliance with applicable laws and regulations related to telemedicine.    DSM-5 Diagnoses:  Encounter Diagnoses   Name Primary?     Gender dysphoria in adult Yes     Generalized anxiety disorder        Current Reported Symptoms and Status update:  Changes since last session client decided to quit job and transfer to [employer]. CIient reports reduction in gender dysphoria, anxiety, low mood.    Progress Toward Treatment Goals:   Satisfactory progress     Therapeutic Interventions/Treatment Strategies:    Area(s) of treatment focus addressed in this session included Symptom Management,  Interpersonal Relationship Skills and Gender Health    Psychotherapist offered support, feedback and validation and reinforced use of skills Treatment modalities used include Systemic Relational Therapy Gender Affirming Care Interpersonal Coping Skills: Facilitated discussion on learning and applying radical acceptance skill and Addressed barriers to utilizing coping skills when in distress, Symptoms Management: Promoted understanding of their diagnoses and how it impacts their functioning and Relationship Skills: Assisted patients in implementing more effective communication skills in their relationships and Encouraged development and maintenance  of healthy boundaries  Support, Redirection and Feedback    Patient Response:   Patient responded to session by verbalizing understanding and actively engaged  Possible barriers to participation / learning include: N/A    Current Mental Status Exam:   Appearance:  Appropriate   Eye Contact:  Good   Attitude / Demeanor: Cooperative   Speech      Rate / Production: Normal/ Responsive      Volume:  Normal  volume  Orientation:  All  Mood:   Normal  Affect:   Appropriate   Thought Content: Clear   Insight:   Good       Plan/Need for Future Services:  Return for therapy in 2 weeks to treat diagnosed problems.    Patient has a current master individualized treatment plan.  See Epic treatment plan for more information.    Referral / Collaboration:  Referral to another professional/service is not indicated at this time..  Emergency Services Needed?  No    Assignment:  None    Interactive Complexity:  There are four specific communication difficulties that complicate the work of the primary psychiatric procedure.  Interactive complexity (+67827) may be reported when at least one of these difficulties is present.    Communication difficulties present during current the psychiatric procedure include:  1. None.      Signature/Title:    Tenisha Stewart PsyD     Answers for HPI/ROS  submitted by the patient on 4/15/2024  If you checked off any problems, how difficult have these problems made it for you to do your work, take care of things at home, or get along with other people?: Somewhat difficult  PHQ9 TOTAL SCORE: 3  MICHELLE 7 TOTAL SCORE: 15

## 2024-04-15 NOTE — NURSING NOTE
Is the patient currently in the state of [place]? YES    Visit mode:VIDEO    If the visit is dropped, the patient can be reconnected by: VIDEO VISIT: Send to e-mail at: [email or web address]    Will anyone else be joining the visit? NO  (If patient encounters technical issues they should call [phone]    How would you like to obtain your AVS? MyChart    Are changes needed to the allergy or medication list? N/A    Are refills needed on medications prescribed by this physician? NO    Reason for visit: DIEGO ALFARO

## 2024-04-22 ENCOUNTER — OFFICE VISIT (OUTPATIENT)
Dept: PSYCHOLOGY | Facility: CLINIC | Age: 29
End: 2024-04-22
Payer: COMMERCIAL

## 2024-04-22 DIAGNOSIS — F33.3 MAJOR DEPRESSIVE DISORDER, RECURRENT, SEVERE WITH PSYCHOTIC FEATURES (H): ICD-10-CM

## 2024-04-22 DIAGNOSIS — F64.0 GENDER DYSPHORIA IN ADULT: Primary | ICD-10-CM

## 2024-04-22 PROCEDURE — 90853 GROUP PSYCHOTHERAPY: CPT | Mod: HN

## 2024-04-22 NOTE — PROGRESS NOTES
"    [facility] Progress [facility]    Date of Service: 24   Name: Kd Reyes)  : 1995  Medical Record Number: [id number]  Treating Provider: Candi Mera, PhD   Type of Session: group  Present in Session: Client  Type of Session: Group  Number of Minutes: 120 mins with group members present  Therapist(s): Ernestine Sanders, Doctoral Psychology Intern (PsyD) and Candi Mera, PhD,      Video start time: 6:30 PM  Video end time: 8:30 PM     SERVICE MODALITY:  In Person      DSM-5 Diagnoses:  F64.0 - Gender Dysphoria in Adolescent and Adult   296.33 Recurrent Major depression      Current Reported Symptoms and Status update:  Experiences gender dysphoria;  ups and down with mood and self-perception, experiences paranoia and struggles in interpersonal relationships.      Changes since last session: Reported transferring work locations, which is not any more supportive than her previous one. In terms of gender, feeling the \"most confident I have been.\"    Progress Toward Treatment Goals:   Satisfactory      Therapeutic Interventions/Treatment Strategies:     Area(s) of treatment focus addressed in this session included Symptom Management, Interpersonal Relationship Skills, Gender Health and Wellness       Cognitive behavioral, interpersonal, and group therapy interventions were utilized to explore gender dysphoria (i.e., transition, interpersonal relationships, and medical interventions) and mental health symptoms, specifically depression and anxiety.  Dav took time in group to discuss feeling objectified by others because of her identities.  Expressed vulnerability by discussing a dinner interaction that occurred post-group, where comments where made around her necklace and it being sexualized. Spoke up about \"I am a person\" and the interaction made her uncomfortable due to feeling like she is \"constantly performing.\" Dav was receptive as other group member's apologized for " "their behaviors. She displayed an appreciation towards apologizes made by group members and processed her here-and-now reaction after others validated and commented on her characteristic traits (intelligence, creativeness).    Treatment modalities used include [facility] THERAPY INTERVENTIONS: Motivational Interviewing Group Dynamic Therapy  Behavioral Activation: Explored how behaviors effect mood and interact with thoughts and feelings;  Promoting gender health and resiliency.      Patient Response:   Patient responded to session by listening and actively engaged.      Possible barriers to participation / learning include: some self-described paranoia      Current Mental Status Exam:   Appearance:  Appropriate   Eye Contact:  Good   Attitude / Demeanor: Cooperative and engaged  Speech      Rate / Production: Normal/ Responsive      Volume:  Normal  volume  Orientation:  All  Mood:   Sad, Upset (\"dog shit mood\")  Affect:   Appropriate   Thought Content: Clear   Insight:   Good      Plan/Need for Future Services:  Return for group therapy in 2 weeks to treat diagnosed problems.    Patient has a current master individualized treatment plan.  See Epic treatment plan for more information.     Referral / Collaboration:  Referral to another professional/service is not indicated at this time.  Emergency Services Needed?  No     Assignment:  none     Interactive Complexity:  There are four specific communication difficulties that complicate the work of the primary psychiatric procedure.  Interactive complexity (+58410) may be reported when at least one of these difficulties is present.     Communication difficulties present during current the psychiatric procedure include:  None.       Ernestine Sanders, Doctoral Psychology Intern (PsyD)  "

## 2024-05-06 ENCOUNTER — VIRTUAL VISIT (OUTPATIENT)
Dept: PSYCHOLOGY | Facility: CLINIC | Age: 29
End: 2024-05-06
Payer: COMMERCIAL

## 2024-05-06 DIAGNOSIS — F33.3 MAJOR DEPRESSIVE DISORDER, RECURRENT, SEVERE WITH PSYCHOTIC FEATURES (H): ICD-10-CM

## 2024-05-06 DIAGNOSIS — F64.0 GENDER DYSPHORIA IN ADULT: Primary | ICD-10-CM

## 2024-05-06 PROCEDURE — 90837 PSYTX W PT 60 MINUTES: CPT | Mod: 95 | Performed by: MARRIAGE & FAMILY THERAPIST

## 2024-05-06 NOTE — NURSING NOTE
Is the patient currently in the state of [place]? YES    Visit mode:VIDEO    If the visit is dropped, the patient can be reconnected by: VIDEO VISIT:  Send e-mail to at [email or web address]    Will anyone else be joining the visit? No  (If patient encounters technical issues they should call [phone])    How would you like to obtain your AVS? MyChart    Are changes needed to the allergy or medication list? N/A    Are refills needed on medications prescribed by this physician? NO    Rooming Documentation: Questionnaire(s) not assigned, not done per department protocol.    Reason for visit: RECHECK     Justine Hobbs, VVF

## 2024-05-06 NOTE — PROGRESS NOTES
[facility] - Progress Note    Date of Service: 24   Name: Kd Slater  : 1995  Medical Record Number: [id number]  Treating Provider: Tenisha Stewart PsyD  Type of Session: Individual  Present in Session: Client  Session Start and Stop Time: 10:02-10:58  Number of Minutes: 56    SERVICE MODALITY:  Video Visit:      Provider verified identity through the following two step process.  Patient provided:  Patient was verified at admission/transfer    Telemedicine Visit: The patient's condition can be safely assessed and treated via synchronous audio and visual telemedicine encounter.      Reason for Telemedicine Visit: Patient convenience (e.g. access to timely appointments / distance to available provider)    Originating Site (Patient Location): Patient's home    Distant Site (Provider Location): [facility]    Consent:  The patient/guardian has verbally consented to: the potential risks and benefits of telemedicine (video visit) versus in person care; bill my insurance or make self-payment for services provided; and responsibility for payment of non-covered services.     Patient would like the video invitation sent by:  My Chart    Mode of Communication:  Video Conference via [facility]    Distant Location (Provider):  On-site    As the provider I attest to compliance with applicable laws and regulations related to telemedicine.    DSM-5 Diagnoses:  Encounter Diagnoses   Name Primary?     Gender dysphoria in adult Yes     Major depressive disorder, recurrent, severe with psychotic features (H)      Current Reported Symptoms and Status update:  Changes since last session- client reports increased paranoia, depressed mood, negative self concept, dysphoria, judgement, loathing (self and other).     Progress Toward Treatment Goals:   Satisfactory progress     Therapeutic Interventions/Treatment Strategies:    Area(s) of treatment focus  addressed in this session included Symptom Management, Interpersonal Relationship Skills and Gender Health    Explored how clients past (Put bad Jef under the bed) and lack of acceptance for who she is since childhood has manifested in social and work dynamics, fears of rejection, not fitting in.     Psychotherapist offered support, feedback and validation and reinforced use of skills Treatment modalities used include Systemic Relational Therapy Gender Affirming Care Interpersonal Radical Healing  Coping Skills: Facilitated discussion on learning and applying radical acceptance skill, Emotions Management:  Increased awareness of daily mood patterns/changes, Other: Assisted patient  in finding ways to adapt functioning in light of past traumatic experiences and Relationship Skills: Assisted patients in implementing more effective communication skills in their relationships and Discussed strategies to promote healthier understanding of interpersonal relationships  Support and Feedback    Patient Response:   Patient responded to session by verbalizing understanding and actively engaged  Possible barriers to participation / learning include: N/A    Current Mental Status Exam:   Appearance:  Appropriate   Eye Contact:  Good   Attitude / Demeanor: Cooperative   Speech      Rate / Production: Normal/ Responsive      Volume:  Normal  volume  Orientation:  All  Mood:   Normal  Affect:   Appropriate   Thought Content: Clear   Insight:   Good       Plan/Need for Future Services:  Return for therapy in 2 weeks to treat diagnosed problems.    Patient has a current master individualized treatment plan.  See Epic treatment plan for more information.    Referral / Collaboration:  Referral to another professional/service is not indicated at this time..  Emergency Services Needed?  No    Assignment:  None    Interactive Complexity:  There are four specific communication difficulties that complicate the work of the primary psychiatric  procedure.  Interactive complexity (+40191) may be reported when at least one of these difficulties is present.    Communication difficulties present during current the psychiatric procedure include:  1. None.      Signature/Title:    Tenisha Stewart PsyD

## 2024-05-20 ENCOUNTER — VIRTUAL VISIT (OUTPATIENT)
Dept: PSYCHOLOGY | Facility: CLINIC | Age: 29
End: 2024-05-20
Payer: COMMERCIAL

## 2024-05-20 DIAGNOSIS — F64.0 GENDER DYSPHORIA IN ADULT: Primary | ICD-10-CM

## 2024-05-20 DIAGNOSIS — F41.1 GENERALIZED ANXIETY DISORDER: ICD-10-CM

## 2024-05-20 PROCEDURE — 90837 PSYTX W PT 60 MINUTES: CPT | Mod: 95 | Performed by: MARRIAGE & FAMILY THERAPIST

## 2024-05-20 NOTE — PROGRESS NOTES
[facility] - Progress Note    Date of Service: 24   Name: Kd Slater  : 1995  Medical Record Number: [id number]  Treating Provider: Tenisha Stewart PsyD  Type of Session: Individual  Present in Session: Client  Session Start and Stop Time: 9:01-9:55  Number of Minutes: 54    SERVICE MODALITY:  Video Visit:      Provider verified identity through the following two step process.  Patient provided:  Patient was verified at admission/transfer    Telemedicine Visit: The patient's condition can be safely assessed and treated via synchronous audio and visual telemedicine encounter.      Reason for Telemedicine Visit: Patient convenience (e.g. access to timely appointments / distance to available provider)    Originating Site (Patient Location): Patient's home    Distant Site (Provider Location): [facility]    Consent:  The patient/guardian has verbally consented to: the potential risks and benefits of telemedicine (video visit) versus in person care; bill my insurance or make self-payment for services provided; and responsibility for payment of non-covered services.     Patient would like the video invitation sent by:  My Chart    Mode of Communication:  Video Conference via [facility]    Distant Location (Provider):  On-site    As the provider I attest to compliance with applicable laws and regulations related to telemedicine.    DSM-5 Diagnoses:  Encounter Diagnoses   Name Primary?     Gender dysphoria in adult Yes     Generalized anxiety disorder      Current Reported Symptoms and Status update:  Changes since last session: Internalized shame, stress and anxiety related to dating.  Questioning around dating, relationships, worthiness.     Progress Toward Treatment Goals:   Satisfactory progress     Therapeutic Interventions/Treatment Strategies:    Area(s) of treatment focus addressed in this session included Symptom Management, [place]  Maintenance/Relapse Prevention, Interpersonal Relationship Skills and Gender Health    Psychotherapist offered support, feedback and validation and reinforced use of skills Treatment modalities used include Narrative Therapy Systemic Relational Therapy Interpersonal Symptoms Management: Promoted understanding of their diagnoses and how it impacts their functioning, Emotions Management:  Increased awareness of daily mood patterns/changes and Relationship Skills: Assisted patients in implementing more effective communication skills in their relationships, Discussed strategies to promote healthier understanding of interpersonal relationships and Discussed relationships and ways to reduce conflict   Support, Redirection and Feedback    Patient Response:   Patient responded to session by verbalizing understanding and actively engaged  Possible barriers to participation / learning include: N/A    Current Mental Status Exam:   Appearance:  Appropriate   Eye Contact:  Good   Attitude / Demeanor: Cooperative   Speech      Rate / Production: Normal/ Responsive      Volume:  Normal  volume  Orientation:  All  Mood:   Anxious   Affect:   Appropriate   Thought Content: Clear   Insight:   Good       Plan/Need for Future Services:  Return for therapy in 2 weeks to treat diagnosed problems.    Patient has a current master individualized treatment plan.  See Epic treatment plan for more information.    Referral / Collaboration:  Referral to another professional/service is not indicated at this time..  Emergency Services Needed?  No    Assignment:  None    Interactive Complexity:  There are four specific communication difficulties that complicate the work of the primary psychiatric procedure.  Interactive complexity (+20558) may be reported when at least one of these difficulties is present.    Communication difficulties present during current the psychiatric procedure include:  1. None.      Signature/Title:    Tenisha Stewart  PsyD

## 2024-05-20 NOTE — NURSING NOTE
Is the patient currently in the state of [place]? YES    Visit mode:VIDEO    If the visit is dropped, the patient can be reconnected by: VIDEO VISIT:  Send e-mail to at [email or web address]    Will anyone else be joining the visit? No  (If patient encounters technical issues they should call [phone])    How would you like to obtain your AVS? MyChart    Are changes needed to the allergy or medication list? N/A    Are refills needed on medications prescribed by this physician? NO    Rooming Documentation: Questionnaire(s) not done per department protocol.    Reason for visit: RECHANGELINA James

## 2024-06-03 ENCOUNTER — VIRTUAL VISIT (OUTPATIENT)
Dept: PSYCHOLOGY | Facility: CLINIC | Age: 29
End: 2024-06-03
Payer: COMMERCIAL

## 2024-06-03 DIAGNOSIS — F64.0 GENDER DYSPHORIA IN ADULT: ICD-10-CM

## 2024-06-03 DIAGNOSIS — F41.1 GENERALIZED ANXIETY DISORDER: Primary | ICD-10-CM

## 2024-06-03 DIAGNOSIS — F33.3 MAJOR DEPRESSIVE DISORDER, RECURRENT, SEVERE WITH PSYCHOTIC FEATURES (H): ICD-10-CM

## 2024-06-03 PROCEDURE — 90837 PSYTX W PT 60 MINUTES: CPT | Mod: 95 | Performed by: MARRIAGE & FAMILY THERAPIST

## 2024-06-03 NOTE — NURSING NOTE
Is the patient currently in the state of [place]? YES    Visit mode:VIDEO    If the visit is dropped, the patient can be reconnected by: VIDEO VISIT: Text to cell phone:   Telephone Information:   Mobile [phone]       Will anyone else be joining the visit? No  (If patient encounters technical issues they should call [phone])    How would you like to obtain your AVS? MyChart    Are changes needed to the allergy or medication list? No    Rooming Documentation: Questionnaire(s) not done per department protocol.    Reason for visit: ANGELINA Gilmore

## 2024-06-03 NOTE — PROGRESS NOTES
[facility] - Progress Note    Date of Service: 24   Name: Kd Slater  : 1995  Medical Record Number: [id number]  Treating Provider: Tenisha Stewart PsyD   Type of Session: Individual  Present in Session: Client  Session Start and Stop Time: 9:02-10  Number of Minutes:  58    SERVICE MODALITY:  Video Visit:      Provider verified identity through the following two step process.  Patient provided:  Patient was verified at admission/transfer    Telemedicine Visit: The patient's condition can be safely assessed and treated via synchronous audio and visual telemedicine encounter.      Reason for Telemedicine Visit: Patient convenience (e.g. access to timely appointments / distance to available provider)    Originating Site (Patient Location): Patient's home    Distant Site (Provider Location): [facility]    Consent:  The patient/guardian has verbally consented to: the potential risks and benefits of telemedicine (video visit) versus in person care; bill my insurance or make self-payment for services provided; and responsibility for payment of non-covered services.     Patient would like the video invitation sent by:  My Chart    Mode of Communication:  Video Conference via [facility]    Distant Location (Provider):  On-site    As the provider I attest to compliance with applicable laws and regulations related to telemedicine.    DSM-5 Diagnoses:  Encounter Diagnoses   Name Primary?    Gender dysphoria in adult     Generalized anxiety disorder Yes    Major depressive disorder, recurrent, severe with psychotic features (H)      Current Reported Symptoms and Status update:  Changes since last session client endorses ongoing struggle with paranoia, internalized transphobia, workplace hypervigilance related to past discrimination and toxic environment.     Progress Toward Treatment Goals:   Satisfactory progress     Therapeutic  Interventions/Treatment Strategies:    Area(s) of treatment focus addressed in this session included Interpersonal Relationship Skills, Gender Health, and Develop Socialization     Explored cognitions related to work, gender, perceptions.    Psychotherapist offered support, feedback and validation and provided redirection Treatment modalities used include Cognitive Behavioral Therapy Gender Affirming Care Interpersonal Cognitive Restructuring:  Explored impact of ineffective thoughts / distortions on mood and activity and Facilitated recognition of the connection between negative thoughts and negative core beliefs and Relationship Skills: Assisted patients in implementing more effective communication skills in their relationships and Discussed strategies to promote healthier understanding of interpersonal relationships  Support and Feedback    Patient Response:   Patient responded to session by verbalizing understanding and actively engaged  Possible barriers to participation / learning include: N/A    Current Mental Status Exam:   Appearance:  Appropriate   Eye Contact:  Good   Attitude / Demeanor: Cooperative   Speech      Rate / Production: Normal/ Responsive      Volume:  Normal  volume  Orientation:  All  Mood:   Normal  Affect:   Appropriate   Thought Content: Clear   Insight:   Good       Plan/Need for Future Services:  Return for therapy in 2 weeks to treat diagnosed problems.    Patient has a current master individualized treatment plan.  See Epic treatment plan for more information.    Referral / Collaboration:  Referral to another professional/service is not indicated at this time..  Emergency Services Needed?  No    Assignment:  None    Interactive Complexity:  There are four specific communication difficulties that complicate the work of the primary psychiatric procedure.  Interactive complexity (+87779) may be reported when at least one of these difficulties is present.    Communication difficulties  present during current the psychiatric procedure include:  None.      Signature/Title:    Tenisha Stewart PsyD

## 2024-06-06 ENCOUNTER — TELEPHONE (OUTPATIENT)
Dept: OTOLARYNGOLOGY | Facility: CLINIC | Age: 29
End: 2024-06-06
Payer: COMMERCIAL

## 2024-06-06 NOTE — TELEPHONE ENCOUNTER
Patient confirmed scheduled appointment:  Date: 10/14  Time: 1pm  Visit type: Return SLP Voice  Provider: Ernestine Navas   Location: Virtual   Testing/imaging:   Additional notes:

## 2024-06-08 ENCOUNTER — HEALTH MAINTENANCE LETTER (OUTPATIENT)
Age: 29
End: 2024-06-08

## 2024-06-10 ENCOUNTER — OFFICE VISIT (OUTPATIENT)
Dept: PSYCHOLOGY | Facility: CLINIC | Age: 29
End: 2024-06-10
Payer: COMMERCIAL

## 2024-06-10 DIAGNOSIS — F33.3 MAJOR DEPRESSIVE DISORDER, RECURRENT, SEVERE WITH PSYCHOTIC FEATURES (H): ICD-10-CM

## 2024-06-10 DIAGNOSIS — F64.0 GENDER DYSPHORIA IN ADULT: Primary | ICD-10-CM

## 2024-06-10 DIAGNOSIS — F41.1 GENERALIZED ANXIETY DISORDER: ICD-10-CM

## 2024-06-10 PROCEDURE — 90853 GROUP PSYCHOTHERAPY: CPT | Mod: HN

## 2024-06-10 NOTE — PROGRESS NOTES
"    [facility] - Progress Note    Date of Service: 6/10/24   Name: Kd Reyes)  : 1995  Medical Record Number: [id number]  Treating Provider: Candi Mera, PhD   Type of Session: group  Present in Session: Client  Type of Session: Group  Number of Minutes: 120 mins with group members present  Therapist(s): Ernestine Sanders, Doctoral Psychology Intern (PsFiliberto) and Candi Mera, PhD, LP     Video start time: 6:30 PM  Video end time: 8:30 PM     SERVICE MODALITY:  In Person      DSM-5 Diagnoses:  F64.0 - Gender Dysphoria in Adolescent and Adult   296.33 Recurrent Major depression      Current Reported Symptoms and Status update:  Experiences gender dysphoria;  ups and down with mood and self-perception, experiences paranoia and struggles in interpersonal relationships.      Changes since last session: Left old job, started working at [employer], and feels \"weird\" interacting with others.    Progress Toward Treatment Goals:   Satisfactory      Therapeutic Interventions/Treatment Strategies:     Area(s) of treatment focus addressed in this session included Symptom Management, Interpersonal Relationship Skills, Gender Health and Wellness       Cognitive behavioral, interpersonal, and group therapy interventions were utilized to explore gender dysphoria (i.e., transition, interpersonal relationships, and medical interventions) and mental health symptoms, specifically depression and anxiety.  Dav requested more time in group to discuss her continual struggles of connecting socially. Specifically, how she worries so much and forgets how to \"exist as a person.\" She further discussed an example of attending a work barbeque at [facility], showing up late, and vocalized to coworkers how uncomfortable she was to be there. Dav also reported wanting to quit the new job. She acknowledged that her social difficulties are related to gender and desires to stop thinking about " "gender, as it is \"consuming.\" She also expressed an interest to talk with her individual therapist about detransitioning. She spend the rest of the group listened attentively while others shared their experiences. She was also open to suggestions of continuing her work and recognizing that she may be bringing in her own negative biases in the workspace.    Treatment modalities used include [facility] THERAPY INTERVENTIONS: Motivational Interviewing Group Dynamic Therapy  Behavioral Activation: Explored how behaviors effect mood and interact with thoughts and feelings;  Promoting gender health and resiliency.      Patient Response:   Patient responded to session by listening and actively engaged.      Possible barriers to participation / learning include: some self-described paranoia      Current Mental Status Exam:   Appearance:  Appropriate   Eye Contact:  Good   Attitude / Demeanor: Cooperative and engaged  Speech      Rate / Production: Normal/ Responsive      Volume:  Normal  volume  Orientation:  All  Mood:   Anxious  Affect:   Appropriate   Thought Content: Clear   Insight:   Good      Plan/Need for Future Services:  Return for group therapy in 2 weeks to treat diagnosed problems.    Patient has a current master individualized treatment plan.  See Epic treatment plan for more information.     Referral / Collaboration:  Referral to another professional/service is not indicated at this time.  Emergency Services Needed?  No     Assignment:  none     Interactive Complexity:  There are four specific communication difficulties that complicate the work of the primary psychiatric procedure.  Interactive complexity (+64173) may be reported when at least one of these difficulties is present.     Communication difficulties present during current the psychiatric procedure include:  None.       Ernestine Sanders, Doctoral Psychology Intern (PsyD)  "

## 2024-06-11 ENCOUNTER — VIRTUAL VISIT (OUTPATIENT)
Dept: PSYCHOLOGY | Facility: CLINIC | Age: 29
End: 2024-06-11
Payer: COMMERCIAL

## 2024-06-11 DIAGNOSIS — F33.3 MAJOR DEPRESSIVE DISORDER, RECURRENT, SEVERE WITH PSYCHOTIC FEATURES (H): ICD-10-CM

## 2024-06-11 DIAGNOSIS — F64.0 GENDER DYSPHORIA IN ADULT: Primary | ICD-10-CM

## 2024-06-11 DIAGNOSIS — F41.1 GENERALIZED ANXIETY DISORDER: ICD-10-CM

## 2024-06-11 PROCEDURE — 90837 PSYTX W PT 60 MINUTES: CPT | Mod: 95 | Performed by: MARRIAGE & FAMILY THERAPIST

## 2024-06-11 NOTE — PROGRESS NOTES
[facility] - Progress Note    Date of Service: 24   Name: Kd Slater  : 1995  Medical Record Number: [id number]  Treating Provider: Tenisha Stewart PsyD   Type of Session: Individual  Present in Session: Client  Session Start and Stop Time: 9:04-10  Number of Minutes: 56    SERVICE MODALITY:  Video Visit:      Provider verified identity through the following two step process.  Patient provided:  Patient was verified at admission/transfer    Telemedicine Visit: The patient's condition can be safely assessed and treated via synchronous audio and visual telemedicine encounter.      Reason for Telemedicine Visit: Patient convenience (e.g. access to timely appointments / distance to available provider)    Originating Site (Patient Location): Patient's home    Distant Site (Provider Location): [facility]    Consent:  The patient/guardian has verbally consented to: the potential risks and benefits of telemedicine (video visit) versus in person care; bill my insurance or make self-payment for services provided; and responsibility for payment of non-covered services.     Patient would like the video invitation sent by:  My Chart    Mode of Communication:  Video Conference via [facility]    Distant Location (Provider):  On-site    As the provider I attest to compliance with applicable laws and regulations related to telemedicine.    DSM-5 Diagnoses:  Encounter Diagnoses   Name Primary?    Gender dysphoria in adult Yes    Major depressive disorder, recurrent, severe with psychotic features (H)     Generalized anxiety disorder      Current Reported Symptoms and Status update:  Changes since last session- client reports increased dysphoria, paranoia, self-sabotage, social insecurities, anxiety, low mood.    Progress Toward Treatment Goals:   Minimal progress     Therapeutic Interventions/Treatment Strategies:    Area(s) of treatment focus addressed  in this session included Symptom Management, Interpersonal Relationship Skills, and Gender Health      Psychotherapist offered support, feedback and validation and provided redirection Treatment modalities used include Systemic Relational Therapy Gender Affirming Care Interpersonal Symptoms Management: Promoted understanding of their diagnoses and how it impacts their functioning, Other: Assisted patient  in finding ways to adapt functioning in light of past traumatic experiences, Relationship Skills: Assisted patients in implementing more effective communication skills in their relationships and Discussed strategies to promote healthier understanding of interpersonal relationships, and discussed gender literacy  Support, Redirection, and Feedback    Patient Response:   Patient responded to session by verbalizing understanding and actively engaged  Possible barriers to participation / learning include: N/A    Current Mental Status Exam:   Appearance:  Appropriate   Eye Contact:  Good   Attitude / Demeanor: Cooperative   Speech      Rate / Production: Normal/ Responsive      Volume:  Normal  volume  Orientation:  All  Mood:   Normal  Affect:   Appropriate   Thought Content: Clear   Insight:   Good       Plan/Need for Future Services:  Return for therapy in 1-2 weeks to treat diagnosed problems.    Patient has a current master individualized treatment plan.  See Epic treatment plan for more information.    Referral / Collaboration:  Referral to another professional/service is not indicated at this time..  Emergency Services Needed?  No    Assignment:  None    Interactive Complexity:  There are four specific communication difficulties that complicate the work of the primary psychiatric procedure.  Interactive complexity (+31034) may be reported when at least one of these difficulties is present.    Communication difficulties present during current the psychiatric procedure include:  None.      Signature/Title:    Tenisha  Zina Stewart

## 2024-06-11 NOTE — NURSING NOTE
Is the patient currently in the state of [place]? YES    Visit mode:VIDEO    If the visit is dropped, the patient can be reconnected by: VIDEO VISIT:  Send e-mail to at [email or web address]    Will anyone else be joining the visit? No  (If patient encounters technical issues they should call [phone])    How would you like to obtain your AVS? MyChart    Are changes needed to the allergy or medication list? N/A    Are refills needed on medications prescribed by this physician? NO    Rooming Documentation: Questionnaire(s) not done per department protocol.    Reason for visit: RECHANGELINA James

## 2024-06-17 ENCOUNTER — TELEPHONE (OUTPATIENT)
Dept: PSYCHOLOGY | Facility: CLINIC | Age: 29
End: 2024-06-17
Payer: COMMERCIAL

## 2024-06-17 NOTE — TELEPHONE ENCOUNTER
[facility] Call Center    Phone Message    May a detailed message be left on voicemail: yes     Reason for Call: Appointment  Provider Name: Tenisha Stewart    Action Taken: Other: Lvm for pt, notified them their provider, Tenisha Pat would not attend their session and would need to reschedule to the following date/times:    06/17- 12pm, 1pm, 2pm  06/20 - 9am, 10am     Travel Screening: Not Applicable     Date of Service: 06/17/2024 CF

## 2024-06-18 ENCOUNTER — VIRTUAL VISIT (OUTPATIENT)
Dept: PSYCHOLOGY | Facility: CLINIC | Age: 29
End: 2024-06-18
Payer: COMMERCIAL

## 2024-06-18 DIAGNOSIS — F64.0 GENDER DYSPHORIA IN ADULT: Primary | ICD-10-CM

## 2024-06-18 DIAGNOSIS — F41.1 GENERALIZED ANXIETY DISORDER: ICD-10-CM

## 2024-06-18 DIAGNOSIS — F33.3 MAJOR DEPRESSIVE DISORDER, RECURRENT, SEVERE WITH PSYCHOTIC FEATURES (H): ICD-10-CM

## 2024-06-18 PROCEDURE — 90837 PSYTX W PT 60 MINUTES: CPT | Mod: 95 | Performed by: MARRIAGE & FAMILY THERAPIST

## 2024-06-18 NOTE — PROGRESS NOTES
[facility] - Progress Note    Date of Service: 24   Name: Kd Slater  : 1995  Medical Record Number: [id number]  Treating Provider: Tenisha Stewart PsyD   Type of Session: Individual  Present in Session: Client   Session Start and Stop Time: 1212:55  Number of Minutes:  55    SERVICE MODALITY:  Video Visit:      Provider verified identity through the following two step process.  Patient provided:  Patient was verified at admission/transfer    Telemedicine Visit: The patient's condition can be safely assessed and treated via synchronous audio and visual telemedicine encounter.      Reason for Telemedicine Visit: Patient convenience (e.g. access to timely appointments / distance to available provider)    Originating Site (Patient Location): Patient's home    Distant Site (Provider Location): [facility]    Consent:  The patient/guardian has verbally consented to: the potential risks and benefits of telemedicine (video visit) versus in person care; bill my insurance or make self-payment for services provided; and responsibility for payment of non-covered services.     Patient would like the video invitation sent by:  My Chart    Mode of Communication:  Video Conference via [employer]    Distant Location (Provider):  Off-site    As the provider I attest to compliance with applicable laws and regulations related to telemedicine.    DSM-5 Diagnoses:  Encounter Diagnoses   Name Primary?    Gender dysphoria in adult Yes    Major depressive disorder, recurrent, severe with psychotic features (H)     Generalized anxiety disorder      Current Reported Symptoms and Status update:  Changes since last session- client reports continued dysphoria, questioning, anxiety, depression, worry, paranoia.     Progress Toward Treatment Goals:   Minimal progress     Therapeutic Interventions/Treatment Strategies:    Area(s) of treatment focus addressed in this  session included Symptom Management, Interpersonal Relationship Skills, and Gender Health    Psychotherapist offered support, feedback and validation, provided redirection, and reinforced use of skills Treatment modalities used include Motivational Interviewing Narrative Therapy Gender Affirming Care Interpersonal Cognitive Restructuring:  Explored impact of ineffective thoughts / distortions on mood and activity and Assisted patient in formulating new neutral/positive alternatives to challenge less helpful / ineffective thoughts, Coping Skills: Addressed barriers to utilizing coping skills when in distress, Other: Assisted patient  in finding ways to adapt functioning in light of past traumatic experiences and Explored with patient how life transitions may impact mental health and functioning , and Relationship Skills: Assisted patients in implementing more effective communication skills in their relationships and Discussed strategies to promote healthier understanding of interpersonal relationships  Support, Feedback, and Education    Patient Response:   Patient responded to session by verbalizing understanding and actively engaged  Possible barriers to participation / learning include: N/A    Current Mental Status Exam:   Appearance:  Appropriate   Eye Contact:  Good   Attitude / Demeanor: Cooperative   Speech      Rate / Production: Normal/ Responsive      Volume:  Normal  volume  Orientation:  All  Mood:   Anxious  Depressed   Affect:   Appropriate   Thought Content: Clear   Insight:   Good       Plan/Need for Future Services:  Return for therapy in 2 weeks to treat diagnosed problems.    Patient has a current master individualized treatment plan.  See Epic treatment plan for more information.    Referral / Collaboration:  Referral to another professional/service is not indicated at this time..  Emergency Services Needed?  No    Assignment:  None    Interactive Complexity:  There are four specific communication  difficulties that complicate the work of the primary psychiatric procedure.  Interactive complexity (+59803) may be reported when at least one of these difficulties is present.    Communication difficulties present during current the psychiatric procedure include:  None.      Signature/Title:    Tenisha Stewart PsyD

## 2024-06-18 NOTE — NURSING NOTE
Is the patient currently in the state of [place]? YES    Visit mode:VIDEO    If the visit is dropped, the patient can be reconnected by: VIDEO VISIT: Text to cell phone:   Telephone Information:   Mobile [phone]       Will anyone else be joining the visit? No  (If patient encounters technical issues they should call [phone])    How would you like to obtain your AVS? MyChart    Are changes needed to the allergy or medication list? No    Rooming Documentation: Questionnaire(s) not done per department protocol.    Reason for visit: ANGELINA Gilmore

## 2024-06-24 ENCOUNTER — OFFICE VISIT (OUTPATIENT)
Dept: PSYCHOLOGY | Facility: CLINIC | Age: 29
End: 2024-06-24
Payer: COMMERCIAL

## 2024-06-24 ENCOUNTER — VIRTUAL VISIT (OUTPATIENT)
Dept: PSYCHOLOGY | Facility: CLINIC | Age: 29
End: 2024-06-24
Payer: COMMERCIAL

## 2024-06-24 DIAGNOSIS — F64.0 GENDER DYSPHORIA IN ADULT: Primary | ICD-10-CM

## 2024-06-24 DIAGNOSIS — F33.3 MAJOR DEPRESSIVE DISORDER, RECURRENT, SEVERE WITH PSYCHOTIC FEATURES (H): ICD-10-CM

## 2024-06-24 DIAGNOSIS — F41.1 GENERALIZED ANXIETY DISORDER: ICD-10-CM

## 2024-06-24 PROCEDURE — 90853 GROUP PSYCHOTHERAPY: CPT | Performed by: PSYCHOLOGIST

## 2024-06-24 PROCEDURE — 90837 PSYTX W PT 60 MINUTES: CPT | Mod: 95 | Performed by: MARRIAGE & FAMILY THERAPIST

## 2024-06-24 NOTE — PROGRESS NOTES
[facility] - Progress Note    Date of Service: 24   Name: Kd Slater  : 1995  Medical Record Number: [id number]  Treating Provider: .Tenisha Stewart PsyD   Type of Session: Individual  Present in Session: Client  Session Start and Stop Time: :55  Number of Minutes:  55    SERVICE MODALITY:  Video Visit:      Provider verified identity through the following two step process.  Patient provided:  Patient was verified at admission/transfer    Telemedicine Visit: The patient's condition can be safely assessed and treated via synchronous audio and visual telemedicine encounter.      Reason for Telemedicine Visit: Patient convenience (e.g. access to timely appointments / distance to available provider)    Originating Site (Patient Location): Patient's home    Distant Site (Provider Location): [facility]    Consent:  The patient/guardian has verbally consented to: the potential risks and benefits of telemedicine (video visit) versus in person care; bill my insurance or make self-payment for services provided; and responsibility for payment of non-covered services.     Patient would like the video invitation sent by:  My Chart    Mode of Communication:  Video Conference via [facility]    Distant Location (Provider):  On-site    As the provider I attest to compliance with applicable laws and regulations related to telemedicine.    DSM-5 Diagnoses:  Encounter Diagnoses   Name Primary?    Gender dysphoria in adult Yes    Major depressive disorder, recurrent, severe with psychotic features (H)     Generalized anxiety disorder      Current Reported Symptoms and Status update:  Changes since last session- client reports improvements in self talk, confidence, recent experiences of derealization, questioning. Continued gender dysphoria.     Progress Toward Treatment Goals:   Minimal progress     Therapeutic Interventions/Treatment  Strategies:    Area(s) of treatment focus addressed in this session included Symptom Management, Interpersonal Relationship Skills, and Gender Health    Psychotherapist offered support, feedback and validation, provided redirection, and reinforced use of skills Treatment modalities used include Narrative Therapy Systemic Relational Therapy Gender Affirming Care Interpersonal Mindfulness: Encouraged a plan to use mindfulness skills in daily life, Other: Assisted patient  in finding ways to adapt functioning in light of past traumatic experiences, and Relationship Skills: Discussed strategies to promote healthier understanding of interpersonal relationships  Support, Redirection, and Feedback    Patient Response:   Patient responded to session by verbalizing understanding and actively engaged  Possible barriers to participation / learning include: N/A    Current Mental Status Exam:   Appearance:  Appropriate   Eye Contact:  Good   Attitude / Demeanor: Cooperative   Speech      Rate / Production: Normal/ Responsive      Volume:  Normal  volume  Orientation:  All  Mood:   Anxious   Affect:   Appropriate   Thought Content: Clear   Insight:   Good       Plan/Need for Future Services:  Return for therapy in 1-2 weeks to treat diagnosed problems.    Patient has a current master individualized treatment plan.  See Epic treatment plan for more information.    Referral / Collaboration:  Referral to another professional/service is not indicated at this time..  Emergency Services Needed?  No    Assignment:  None    Interactive Complexity:  There are four specific communication difficulties that complicate the work of the primary psychiatric procedure.  Interactive complexity (+45754) may be reported when at least one of these difficulties is present.    Communication difficulties present during current the psychiatric procedure include:  None.      Signature/Title:    Tenisha Stewart PsyD

## 2024-06-24 NOTE — NURSING NOTE
Is the patient currently in the state of [place]? YES    Visit mode:VIDEO    If the visit is dropped, the patient can be reconnected by: VIDEO VISIT: Text to cell phone:   Telephone Information:   Mobile [phone]       Will anyone else be joining the visit? No  (If patient encounters technical issues they should call [phone])    How would you like to obtain your AVS? MyChart    Are changes needed to the allergy or medication list? No    Rooming Documentation: Questionnaire(s) not done per department protocol.    Reason for visit: No chief complaint on file.     ANGELINA Benson

## 2024-06-24 NOTE — PROGRESS NOTES
[facility]    Date of Service: 24   Name: Kd Reyes)  : 1995  Medical Record Number: [id number]  Treating Provider: Candi Mera, PhD   Type of Session: group  Present in Session: Client  Type of Session: Group  Number of Minutes: 120 mins with group members present  Therapist(s): Ernestine Sanders, Doctoral Psychology Intern (PsFiliberto) and Candi Mera, PhD,      Video start time: 6:30 PM  Video end time: 8:30 PM     SERVICE MODALITY:  In Person      DSM-5 Diagnoses:  F64.0 - Gender Dysphoria in Adolescent and Adult   296.33 Recurrent Major depression      Current Reported Symptoms and Status update:  Experiences gender dysphoria;  ups and down with mood and self-perception, experiences paranoia and struggles in interpersonal relationships.      Changes since last session: decided to not continue in [employer] job because wasn't a good fit.     Progress Toward Treatment Goals:   Satisfactory      Therapeutic Interventions/Treatment Strategies:     Area(s) of treatment focus addressed in this session included Symptom Management, Interpersonal Relationship Skills, Gender Health and Wellness       Cognitive behavioral, interpersonal, and group therapy interventions were utilized to explore gender dysphoria (i.e., transition, interpersonal relationships, and medical interventions) and mental health symptoms, specifically depression and anxiety.  Dav did not request more time in group.  But she listened attentively while others shared their experiences.     Treatment modalities used include [facility] THERAPY INTERVENTIONS: Motivational Interviewing Group Dynamic Therapy  Behavioral Activation: Explored how behaviors effect mood and interact with thoughts and feelings;  Promoting gender health and resiliency.      Patient Response:   Patient responded to session by listening and actively engaged.      Possible barriers to participation / learning include: some  self-described paranoia      Current Mental Status Exam:   Appearance:  Appropriate   Eye Contact:  Good   Attitude / Demeanor: Cooperative and engaged  Speech      Rate / Production: Normal/ Responsive      Volume:  Normal  volume  Orientation:  All  Mood:   optimistic  Affect:   Appropriate   Thought Content: Clear   Insight:   Good      Plan/Need for Future Services:  Return for group therapy in 2 weeks to treat diagnosed problems.    Patient has a current master individualized treatment plan.  See Epic treatment plan for more information.     Referral / Collaboration:  Referral to another professional/service is not indicated at this time.  Emergency Services Needed?  No     Assignment:  none     Interactive Complexity:  There are four specific communication difficulties that complicate the work of the primary psychiatric procedure.  Interactive complexity (+54580) may be reported when at least one of these difficulties is present.     Communication difficulties present during current the psychiatric procedure include:  None.         Candi Mera, PhD  Licensed Psychologist

## 2024-07-08 ENCOUNTER — OFFICE VISIT (OUTPATIENT)
Dept: PSYCHOLOGY | Facility: CLINIC | Age: 29
End: 2024-07-08
Payer: COMMERCIAL

## 2024-07-08 DIAGNOSIS — F64.0 GENDER DYSPHORIA IN ADULT: Primary | ICD-10-CM

## 2024-07-08 DIAGNOSIS — F33.3 MAJOR DEPRESSIVE DISORDER, RECURRENT, SEVERE WITH PSYCHOTIC FEATURES (H): ICD-10-CM

## 2024-07-08 PROCEDURE — 90853 GROUP PSYCHOTHERAPY: CPT | Mod: HN

## 2024-07-09 NOTE — PROGRESS NOTES
"    [facility] - Progress [facility]    Date of Service: 24   Name: Kd Reyes)  : 1995  Medical Record Number: [id number]  Treating Provider: Candi Mera, PhD   Type of Session: group  Present in Session: Client  Type of Session: Group  Number of Minutes: 120 mins with group members present  Therapist(s): Ernestine Sanders, Doctoral Psychology Intern (PsFiliberto) and Candi Mera, PhD,      Video start time: 6:30 PM  Video end time: 8:30 PM     SERVICE MODALITY:  In Person      DSM-5 Diagnoses:  F64.0 - Gender Dysphoria in Adolescent and Adult   296.33 Recurrent Major depression      Current Reported Symptoms and Status update:  Experiences gender dysphoria;  ups and down with mood and self-perception, experiences paranoia and struggles in interpersonal relationships.      Changes since last session: Reported better understanding her patterns, specifically around not enjoying being trans. She is also waiting to hear back on employment.    Progress Toward Treatment Goals:   Satisfactory      Therapeutic Interventions/Treatment Strategies:     Area(s) of treatment focus addressed in this session included Symptom Management, Interpersonal Relationship Skills, Gender Health and Wellness       Cognitive behavioral, interpersonal, and group therapy interventions were utilized to explore gender dysphoria (i.e., transition, interpersonal relationships, and medical interventions) and mental health symptoms, specifically depression and anxiety.  Dav requested to take time to share more about her trans experience. She reported \"trying all the things\" like wearing bras and makeup to help identifying as trans \"feel less shitty.\" Further, she expressed being tried of the expectations placed on her that she cannot meet, such as socially passing. Dav provided an example of going to a pool with a baggy shirt and disliking her body. She also discussed desiring FFS or detransitioning at this " "point. While sharing, Dav demonstrated insight that some of her thoughts may not be accurate/reality and she is working on checking the facts to assess if it is reality or paranoia.    Treatment modalities used include [facility] THERAPY INTERVENTIONS: Motivational Interviewing Group Dynamic Therapy  Behavioral Activation: Explored how behaviors effect mood and interact with thoughts and feelings;  Promoting gender health and resiliency.      Patient Response:   Patient responded to session by listening and actively engaged.      Possible barriers to participation / learning include: some self-described paranoia      Current Mental Status Exam:   Appearance:  Appropriate   Eye Contact:  Good   Attitude / Demeanor: Cooperative and engaged  Speech      Rate / Production: Normal/ Responsive      Volume:  Normal  volume  Orientation:  All  Mood:   Depressed and sad (\"not in a good mood\")  Affect:   Tearful (when discussing her life \"falling apart\")  Thought Content: Clear   Insight:   Good      Plan/Need for Future Services:  Return for group therapy in 2 weeks to treat diagnosed problems.    Patient has a current master individualized treatment plan.  See Epic treatment plan for more information.     Referral / Collaboration:  Referral to another professional/service is not indicated at this time.  Emergency Services Needed?  No     Assignment:  none     Interactive Complexity:  There are four specific communication difficulties that complicate the work of the primary psychiatric procedure.  Interactive complexity (+96385) may be reported when at least one of these difficulties is present.     Communication difficulties present during current the psychiatric procedure include:  None.       Ernestine Sanders, Doctoral Psychology Intern (PsyD)   "

## 2024-07-10 ENCOUNTER — TELEPHONE (OUTPATIENT)
Dept: PSYCHOLOGY | Facility: CLINIC | Age: 29
End: 2024-07-10
Payer: COMMERCIAL

## 2024-07-10 NOTE — TELEPHONE ENCOUNTER
[facility] Call Center   Phone Message   May a detailed message be left on voicemail: yes      Reason for Call: Appointment Cancellation     Referring Provider Name: Noy Alcaraz     Action Taken: Other: Left vm to notify pt that their 9am session with their provider on 9/9 was canceled due to her provider being unavailable that day.  Pt was given the instructions of contacting us to reschedule her appointment. [employer] message was sent to pt on 7/10.     Date of Service: 7/10/2024 Rayray Ritchie

## 2024-07-16 ENCOUNTER — VIRTUAL VISIT (OUTPATIENT)
Dept: PSYCHOLOGY | Facility: CLINIC | Age: 29
End: 2024-07-16
Payer: COMMERCIAL

## 2024-07-16 DIAGNOSIS — F64.0 GENDER DYSPHORIA IN ADULT: Primary | ICD-10-CM

## 2024-07-16 DIAGNOSIS — F41.1 GENERALIZED ANXIETY DISORDER: ICD-10-CM

## 2024-07-16 DIAGNOSIS — F33.3 MAJOR DEPRESSIVE DISORDER, RECURRENT, SEVERE WITH PSYCHOTIC FEATURES (H): ICD-10-CM

## 2024-07-16 PROCEDURE — 90837 PSYTX W PT 60 MINUTES: CPT | Mod: 95 | Performed by: MARRIAGE & FAMILY THERAPIST

## 2024-07-16 NOTE — NURSING NOTE
Current patient location:  N/A    Is the patient currently in the state of [place]? YES    Visit mode:VIDEO    If the visit is dropped, the patient can be reconnected by: VIDEO VISIT: Send to e-mail at: [email or web address]    Will anyone else be joining the visit? NO  (If patient encounters technical issues they should call [phone]    How would you like to obtain your AVS? MyChart    Are changes needed to the allergy or medication list? No    Are refills needed on medications prescribed by this physician? NO    Reason for visit: RECHECK    Samir ALFARO

## 2024-07-16 NOTE — PROGRESS NOTES
[facility] - Progress Note    Date of Service: 24   Name: Dav Slater  : 1995  Medical Record Number: [id number]  Treating Provider: Tenisha Stewart PsyD   Type of Session: Individual  Present in Session: Client  Session Start and Stop Time: 10:00-10:57  Number of Minutes:  57    SERVICE MODALITY:  Video Visit:      Provider verified identity through the following two step process.  Patient provided:  Patient was verified at admission/transfer    Telemedicine Visit: The patient's condition can be safely assessed and treated via synchronous audio and visual telemedicine encounter.      Reason for Telemedicine Visit: Patient convenience (e.g. access to timely appointments / distance to available provider)    Originating Site (Patient Location): Patient's home    Distant Site (Provider Location): [facility]    Consent:  The patient/guardian has verbally consented to: the potential risks and benefits of telemedicine (video visit) versus in person care; bill my insurance or make self-payment for services provided; and responsibility for payment of non-covered services.     Patient would like the video invitation sent by:  My Chart    Mode of Communication:  [facility]    Distant Location (Provider):  On-site    As the provider I attest to compliance with applicable laws and regulations related to telemedicine.    DSM-5 Diagnoses:  Encounter Diagnoses   Name Primary?    Gender dysphoria in adult Yes    Major depressive disorder, recurrent, severe with psychotic features (H)     Generalized anxiety disorder      Current Reported Symptoms and Status update:  Changes since last session- client reports significant gender dysphoria, self-loathing,internalized transphobia, anxiety, rumination, paranoia, low mood.     Progress Toward Treatment Goals:   Minimal progress     Therapeutic Interventions/Treatment Strategies:    Area(s)  of treatment focus addressed in this session included Symptom Management, Gender Health, Develop / Improve Independent Living Skills, and Cultural     Psychotherapist offered support, feedback and validation, provided redirection, and reinforced use of skills Treatment modalities used include Narrative Therapy Gender Affirming Care Interpersonal Cognitive Restructuring:  Explored impact of ineffective thoughts / distortions on mood and activity, Emotions Management:  Increased awareness of daily mood patterns/changes, and Relationship Skills: Assisted patients in implementing more effective communication skills in their relationships and Discussed strategies to promote healthier understanding of interpersonal relationships  Support, Redirection, and Feedback    Recognized pattern related to scapegoat/villain related to hidden/self parts that client can't accept/     Patient Response:   Patient responded to session by verbalizing understanding and actively engaged  Possible barriers to participation / learning include: N/A    Current Mental Status Exam:   Appearance:  Appropriate   Eye Contact:  Good   Attitude / Demeanor: Cooperative   Speech      Rate / Production: Normal/ Responsive      Volume:  Normal  volume  Orientation:  All  Mood:   Normal  Affect:   Appropriate   Thought Content: Clear   Insight:   Good       Plan/Need for Future Services:  Return for therapy in 1-2 weeks to treat diagnosed problems.    Patient has a current master individualized treatment plan.  See Epic treatment plan for more information.    Referral / Collaboration:  Referral to another professional/service is not indicated at this time..  Emergency Services Needed?  No    Assignment:  None    Interactive Complexity:  There are four specific communication difficulties that complicate the work of the primary psychiatric procedure.  Interactive complexity (+02969) may be reported when at least one of these difficulties is  present.    Communication difficulties present during current the psychiatric procedure include:  None.      Signature/Title:    Tenisha Stewart PsyD

## 2024-07-22 ENCOUNTER — OFFICE VISIT (OUTPATIENT)
Dept: PSYCHOLOGY | Facility: CLINIC | Age: 29
End: 2024-07-22
Payer: COMMERCIAL

## 2024-07-22 DIAGNOSIS — F64.0 GENDER DYSPHORIA IN ADULT: Primary | ICD-10-CM

## 2024-07-22 DIAGNOSIS — F33.3 MAJOR DEPRESSIVE DISORDER, RECURRENT, SEVERE WITH PSYCHOTIC FEATURES (H): ICD-10-CM

## 2024-07-22 PROCEDURE — 90853 GROUP PSYCHOTHERAPY: CPT | Performed by: PSYCHOLOGIST

## 2024-07-23 ENCOUNTER — VIRTUAL VISIT (OUTPATIENT)
Dept: PSYCHOLOGY | Facility: CLINIC | Age: 29
End: 2024-07-23
Payer: COMMERCIAL

## 2024-07-23 DIAGNOSIS — F41.1 GENERALIZED ANXIETY DISORDER: ICD-10-CM

## 2024-07-23 DIAGNOSIS — F33.3 MAJOR DEPRESSIVE DISORDER, RECURRENT, SEVERE WITH PSYCHOTIC FEATURES (H): Primary | ICD-10-CM

## 2024-07-23 DIAGNOSIS — F64.0 GENDER DYSPHORIA IN ADULT: ICD-10-CM

## 2024-07-23 PROCEDURE — 90837 PSYTX W PT 60 MINUTES: CPT | Mod: 95 | Performed by: MARRIAGE & FAMILY THERAPIST

## 2024-07-23 NOTE — PROGRESS NOTES
[facility] - Progress Note    Date of Service: 24   Name: Kd Slater  : 1995  Medical Record Number: [id number]  Treating Provider: Tenisha Stewart PsyD   Type of Session: Individual  Present in Session: Client  Session Start and Stop Time: 10:05-11  Number of Minutes:  55    SERVICE MODALITY:  Video Visit:      Provider verified identity through the following two step process.  Patient provided:  Patient was verified at admission/transfer    Telemedicine Visit: The patient's condition can be safely assessed and treated via synchronous audio and visual telemedicine encounter.      Reason for Telemedicine Visit: Patient convenience (e.g. access to timely appointments / distance to available provider)    Originating Site (Patient Location): Patient's home    Distant Site (Provider Location): [facility]    Consent:  The patient/guardian has verbally consented to: the potential risks and benefits of telemedicine (video visit) versus in person care; bill my insurance or make self-payment for services provided; and responsibility for payment of non-covered services.     Patient would like the video invitation sent by:  My Chart    Mode of Communication:  Video Conference via [facility]    Distant Location (Provider):  On-site    As the provider I attest to compliance with applicable laws and regulations related to telemedicine.    DSM-5 Diagnoses:  Encounter Diagnoses   Name Primary?    Gender dysphoria in adult     Major depressive disorder, recurrent, severe with psychotic features (H) Yes    Generalized anxiety disorder      Current Reported Symptoms and Status update:  Changes since last session- client reports challenges related to navigating gender dysphoria, history of trauma and internalized transphobia, internalized misogyny and navigating reactions and triggers.     Progress Toward Treatment Goals:   Minimal progress     Therapeutic  "Interventions/Treatment Strategies:    Area(s) of treatment focus addressed in this session included Symptom Management, Interpersonal Relationship Skills, Gender Health, and Cultural     Psychotherapist offered support, feedback and validation, provided redirection, and reinforced use of skills Treatment modalities used include Systemic Relational Therapy Gender Affirming Care Interpersonal Radical Healing  Cognitive Restructuring:  Explored impact of ineffective thoughts / distortions on mood and activity, Emotions Management:  Increased awareness of daily mood patterns/changes, Other: Assisted patient  in finding ways to adapt functioning in light of past traumatic experiences and Explored with patient how life transitions may impact mental health and functioning , and Relationship Skills: Discussed strategies to promote healthier understanding of interpersonal relationships  Support, Redirection, and Feedback    Patient Response:   Patient responded to session by verbalizing understanding and actively engaged  Possible barriers to participation / learning include: N/A    Current Mental Status Exam:   Appearance:  Appropriate   Eye Contact:  Good   Attitude / Demeanor: Cooperative   Speech      Rate / Production: Normal/ Responsive      Volume:  Normal  volume  Orientation:  All  Mood:   Normal  Affect:   Appropriate   Thought Content: Clear   Insight:   Good       Plan/Need for Future Services:  Return for therapy in 1-2 weeks to treat diagnosed problems.    Patient has a current master individualized treatment plan.  See Epic treatment plan for more information.    Referral / Collaboration:  Referral to another professional/service is not indicated at this time..  Emergency Services Needed?  No    Assignment:  Continue exploring roots of \"scapegoat\" and internalized perceptions.    Interactive Complexity:  There are four specific communication difficulties that complicate the work of the primary psychiatric " procedure.  Interactive complexity (+94634) may be reported when at least one of these difficulties is present.    Communication difficulties present during current the psychiatric procedure include:  None.      Signature/Title:    Tenisha Stewart PsyD

## 2024-07-23 NOTE — NURSING NOTE
Current patient location: [address] [facility] [address]    Is the patient currently in the state of [place]? YES    Visit mode:VIDEO    If the visit is dropped, the patient can be reconnected by: VIDEO VISIT: Text to cell phone:   Telephone Information:   Mobile [phone]       Will anyone else be joining the visit? NO  (If patient encounters technical issues they should call [phone]    How would you like to obtain your AVS? MyChart    Are changes needed to the allergy or medication list? N/A    Are refills needed on medications prescribed by this physician? NO    Reason for visit: RECHECK    Humera ALFARO

## 2024-07-23 NOTE — PROGRESS NOTES
[facility] - Progress [facility]    Date of Service: 24    Name: Kd Slater (Dav)  : 1995  Medical Record Number: [id number]  Treating Provider: Candi Mera, PhD   Type of Session: group  Present in Session: Client  Type of Session: Group  Number of Minutes: 120 mins with group members present  Therapist(s): Ernestine Sanders, Doctoral Psychology Intern (PsyD) and Candi Mera, PhD,      Video start time: 6:30 PM  Video end time: 8:30 PM     SERVICE MODALITY:  In Person      DSM-5 Diagnoses:  F64.0 - Gender Dysphoria in Adolescent and Adult   296.33 Recurrent Major depression      Current Reported Symptoms and Status update:  Experiences gender dysphoria;  ups and down with mood and self-perception, experiences paranoia and struggles in interpersonal relationships.      Changes since last session: great progress in reality checking her perceptions; gaining awareness around how to feel more resilient--playing tennis and practicing guitar; heard that previous work site is having to do training    Progress Toward Treatment Goals:   Satisfactory      Therapeutic Interventions/Treatment Strategies:     Area(s) of treatment focus addressed in this session included Symptom Management, Interpersonal Relationship Skills, Gender Health and Wellness       Cognitive behavioral, interpersonal, and group therapy interventions were utilized to explore gender dysphoria (i.e., transition, interpersonal relationships, and medical interventions) and mental health symptoms, specifically depression and anxiety.  Dav did not request time but shared a number of examples where she has gained a lot of awareness about her perspective taking and how to tell when she needs to do reality testing relationally.  Encouraged this progress and had her share more of her here and now experience so the group can better assist her when they experience her engaging in certain ways. Asked that the group be blunt  and direct with her.     Treatment modalities used include [facility] THERAPY INTERVENTIONS: Motivational Interviewing Group Dynamic Therapy  Behavioral Activation: Explored how behaviors effect mood and interact with thoughts and feelings;  Promoting gender health and resiliency.      Patient Response:   Patient responded to session by listening and actively engaged.      Possible barriers to participation / learning include: some self-described paranoia      Current Mental Status Exam:   Appearance:  Appropriate   Eye Contact:  Good   Attitude / Demeanor: Cooperative and engaged  Speech      Rate / Production: Normal/ Responsive      Volume:  Normal  volume  Orientation:  All  Mood:   Positive  Affect:   Congruent  Thought Content: Clear   Insight:   Good      Plan/Need for Future Services:  Return for group therapy in 2 weeks to treat diagnosed problems.    Patient has a current master individualized treatment plan.  See Epic treatment plan for more information.     Referral / Collaboration:  Referral to another professional/service is not indicated at this time.  Emergency Services Needed?  No     Assignment:  none     Interactive Complexity:  There are four specific communication difficulties that complicate the work of the primary psychiatric procedure.  Interactive complexity (+08949) may be reported when at least one of these difficulties is present.     Communication difficulties present during current the psychiatric procedure include:  None.         Candi Mera, PhD  Licensed Psychologist

## 2024-07-26 ENCOUNTER — VIRTUAL VISIT (OUTPATIENT)
Dept: OTOLARYNGOLOGY | Facility: CLINIC | Age: 29
End: 2024-07-26
Payer: COMMERCIAL

## 2024-07-26 DIAGNOSIS — R49.9 VOICE AND RESONANCE DISORDER: ICD-10-CM

## 2024-07-26 DIAGNOSIS — R49.0 DYSPHONIA: Primary | ICD-10-CM

## 2024-07-26 DIAGNOSIS — F33.3 MAJOR DEPRESSIVE DISORDER, RECURRENT, SEVERE WITH PSYCHOTIC FEATURES (H): ICD-10-CM

## 2024-07-26 DIAGNOSIS — F64.0 GENDER DYSPHORIA IN ADULT: ICD-10-CM

## 2024-07-26 PROCEDURE — 92507 TX SP LANG VOICE COMM INDIV: CPT | Mod: GN | Performed by: SPEECH-LANGUAGE PATHOLOGIST

## 2024-07-26 NOTE — PROGRESS NOTES
"Bryon is a 29 year old adult who is being cared for via a billable virtual visit.        The patient/client has been notified and verbally consented to the following statements:   This video visit will be conducted between you and your provider.  If during the course of the call the provider feels a video visit is not appropriate, you will not be charged for this service.    Provider has received verbal consent for billable virtual visit from the patient? 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    Remarkable findings included:  Perceptual evaluation demonstrated:   Roughness: WNL  Breathiness: WNL  Strain: WNL  Pitch:  F0/Habitual/Conversational speech: informally judged as WNL  Laryngeal exam demonstrated: Not warranted  Primary complaint of patient today included: Seeking of voice and that is comfortable, authentic, and safe.     Additional assessment based discussion included:  I encouragedDav to listen to the body and take breaks, as needed.      Therefore, we recommended a course of speech therapy to address these concerns.\"       SUBJECTIVE:  Since the last appointment, Ms. Slater reports the following:   Overall she reports that symptoms are much better already  7/10 - 10 = matching identity perfectly  Humming along to pitches - helped her hearing " "rupinder herndono it.   Sentences and blurbs.       OBJECTIVE:  Ms. Slater presents today with the following:    Voice quality:  Speaking voice:  Roughness: WNL  Breathiness: WNL  Strain: WNL  Pitch:  F0/Habitual/Conversational speech: informally judged as WNL      PATIENT REPORTED MEASURES:  Patient Supplied Answers To SLP QOL Questionnaire       No data to display              Patient Supplied Answers To VHI Questionnaire      7/26/2024     9:46 AM   Voice Handicap Index (VHI-10)   My voice makes it difficult for people to hear me 1   People have difficulty understanding me in a noisy room 1   My voice difficulties restrict my personal and social life.  1   I feel left out of conversations because of my voice 0   My voice problem causes me to lose income 0   I feel as though I have to strain to produce voice 0   The clarity of my voice is unpredictable 2   My voice problem upsets me 2   My voice makes me feel handicapped 0   People ask, \"What's wrong with your voice?\" 0   VHI-10 7       THERAPEUTIC ACTIVITIES  Demonstrated previous exercises.  demonstrated improved technique  appropriate redirection provided  instruction provided for increased level of complexity/difficulty    Semi-Occluded Vocal Tract (SOVT) exercises instructed to reduce laryngeal tension, promote vocal fold pliability, and coordinate respiration and phonation  Sustained /n/ was found to be most facilitating   Sustained phonation, and voice vs. voiceless productions used to promote easy voicing and raise awareness of laryngeal tension  Ascending and descending glides utilized to promote vocal fold pliability  \"Messa di voce\", gradual crescendo and decrescendo to vary medial compression was also utilized to promote vocal fold pliability.  Instructed on the benefits of using these exercises for improved coordination of breath flow with phonation and tissue mobilization.  Instructed on the importance of using these exercises as a warm-up / cool " "down,  and to re-calibrate the voice throughout the day.    Resonant Voice Therapy (RVT) exercises to promote forward locus of resonance and optimized pattern of laryngeal adduction  Speech material that elicits a high, forward tongue position (/n/) was most facilitating  Easy descending glide on /n/ utilized in conjunction with relaxed jaw, tongue, and lightly closed lips to facilitate forward resonant sound  Use of the carrier phrase \"nnn\" instructed to promote generalization to everyday speech  Syllable level using /n/ in alternation with cardinal vowels on sustained pitches and speech inflection  Word level exercises featuring nasal continuant loaded stimuli  Phrase level exercises featuring nasal continuants in more complex phonemic contexts were employed  Instructed with and interval of a descending glide pattern was facilitating, prior to progressing to comfortable speech using optimal breath flow.  Able to recognize improvement in quality and comfort    Counseling and Education:  Asked many questions about the nature of her symptoms, and I answered all of these thoroughly.  A revised regimen for home practice was instructed.  I provided an AVS of today's therapeutic activities to facilitate practice.    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 Ms. Slater demonstrates and reports very good learning.  She feels that her voice has significantly improved and rates her voice at a 7+ out of 10, and 10 appropriately matched her identity.  Today we focused on listening voice techniques to help further optimize her voice.  She demonstrated very good understanding, application, and progress today.    PLAN: I will see Ms. Slater in 2 weeks, at which point we will continue therapy.   For practice " goals see AVS.     Next Clinic Appt: 8/5/2024  Plan for SLP: Continue to apply techniques flow for resident voice, and also techniques to optimize coordination between breath flow and phonation      TOTAL SERVICE TIME:   Call Initiated at: 10:05 AM  Call Ended at: 11 AM           CPT Billing Codes:   TREATMENT OF SPEECH, LANGUAGE, VOICE, COMMUNICATION, and/or AUDITORY PROCESSING DISORDER (62107)    Ernestine Navas M.M. (voice), M.A., CCC/SLP  Speech-Language Pathologist  [facility] Trained Vocologist  [facility]  [phone]  [email or web address]  Pronouns: she/her      *this report was created in part through the use of computerized dictation software, and though reviewed following completion, some typographic errors may persist.  If there is confusion regarding any of this notes contents, please contact me for clarification

## 2024-07-26 NOTE — PATIENT INSTRUCTIONS
" after Visit Summary    Patient: Dav Slater  Date of Visit: 7/26/2024    These notes are also available in your MyChart. Please take a few moments to find them under \"Past Appointments\" in the [employer] system, as Ernestine will start to phase out e-mail communications.    \"Handouts\" that go along with today's order of activities include (below):   Frequency of practice: 3-4x/day unless marked otherwise    Order of today's appointment:  Take 10 breaths - hand on the neck to notice and reduce tension    \"Hunnnnn\"  Forward Resonant /n/ sound      WHY:  Though these exercises seems (and feels) silly they are helpful for a number of reasons.  First, they make you use your air generously and consistently, helping you to coordinate your breath and your voice.  Second, they lengthen and narrow the vocal tract.  This narrowing (or semi-occlusion in scientific terms) creates back pressure in your throat which has been shown to help the vocal folds vibrate more easily and reduce how hard some of the other muscles are squeezing.    HOW:    Use a gentle  mmmm  like you are relaxing into a comfy chair at the end of a long day.   Make sure that your jaw is released (never clenched), and that your tongue is hanging out like a rug on the floor of your mouth  There can be a tiny  h  at the start of it to keep you from getting too tight.  Start off with the  sigh  pattern listed below.    Feel how open and relaxed your throat feels when you practice these sounds. If it gets tight, don't sweat it.  Just breath, relax, and start again.  Across all the exercises make sure you are getting a nice low breath and feeling the steady inward motion of low abdominal muscles when making sound.      Practice 5 times a day for no more than 3 minutes, and whenever you feel fatigued.    Aren't sure if you are doing it right? Ask yourself these three questions:  Does it feel easy?  Is voice consistent?  Do you feel that forward buzz?            What " sounds to make:    Single pitches - use any comfortable pitch and sustain the note for as long as it feels free and easy, and your lips or tongue are bubbling.        Sighs - glide from high to low like you are sitting down in a comfortable chair after a long day of work        [place] - Start on a medium pitch and glide up just a bit and back down                  Optional for next time: board book of [place]       Ernestine Navas M.M. (voice), M.A., CCC/SLP  Speech-Language Pathologist  [facility] Certified Vocologist  [facility] Voice [facility]  [email or web address]  she/her

## 2024-07-26 NOTE — LETTER
"7/26/2024       RE: Kd Slater  [address] [facility] [address]     Dear Colleague,    Thank you for referring your patient, Kd Slater, to the [facility] [place] [facility]. Please see a copy of my visit note below.    Bryon is a 29 year old adult who is being cared for via a billable virtual visit.        The patient/client has been notified and verbally consented to the following statements:   This video visit will be conducted between you and your provider.  If during the course of the call the provider feels a video visit is not appropriate, you will not be charged for this service.    Provider has received verbal consent for billable virtual visit from the patient? 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    Remarkable findings included:  Perceptual evaluation demonstrated:   Roughness: WNL  Breathiness: WNL  Strain: WNL  Pitch:  F0/Habitual/Conversational speech: informally judged as WNL  Laryngeal exam demonstrated: Not warranted  Primary complaint of patient today included: Seeking of voice and that is comfortable, authentic, and safe.     Additional assessment based discussion included:  I encouragedDav to listen to the body and take breaks, as " "needed.      Therefore, we recommended a course of speech therapy to address these concerns.\"       SUBJECTIVE:  Since the last appointment, Ms. Slater reports the following:   Overall she reports that symptoms are much better already  7/10 - 10 = matching identity perfectly  Humming along to pitches - helped her hearing iand buildinto it.   Sentences and blurbs.       OBJECTIVE:  Ms. Slater presents today with the following:    Voice quality:  Speaking voice:  Roughness: WNL  Breathiness: WNL  Strain: WNL  Pitch:  F0/Habitual/Conversational speech: informally judged as WNL      PATIENT REPORTED MEASURES:  Patient Supplied Answers To SLP QOL Questionnaire       No data to display              Patient Supplied Answers To VHI Questionnaire      7/26/2024     9:46 AM   Voice Handicap Index (VHI-10)   My voice makes it difficult for people to hear me 1   People have difficulty understanding me in a noisy room 1   My voice difficulties restrict my personal and social life.  1   I feel left out of conversations because of my voice 0   My voice problem causes me to lose income 0   I feel as though I have to strain to produce voice 0   The clarity of my voice is unpredictable 2   My voice problem upsets me 2   My voice makes me feel handicapped 0   People ask, \"What's wrong with your voice?\" 0   VHI-10 7       THERAPEUTIC ACTIVITIES  Demonstrated previous exercises.  demonstrated improved technique  appropriate redirection provided  instruction provided for increased level of complexity/difficulty    Semi-Occluded Vocal Tract (SOVT) exercises instructed to reduce laryngeal tension, promote vocal fold pliability, and coordinate respiration and phonation  Sustained /n/ was found to be most facilitating   Sustained phonation, and voice vs. voiceless productions used to promote easy voicing and raise awareness of laryngeal tension  Ascending and descending glides utilized to promote vocal fold pliability  \"Messa di " "voce\", gradual crescendo and decrescendo to vary medial compression was also utilized to promote vocal fold pliability.  Instructed on the benefits of using these exercises for improved coordination of breath flow with phonation and tissue mobilization.  Instructed on the importance of using these exercises as a warm-up / cool down,  and to re-calibrate the voice throughout the day.    Resonant Voice Therapy (RVT) exercises to promote forward locus of resonance and optimized pattern of laryngeal adduction  Speech material that elicits a high, forward tongue position (/n/) was most facilitating  Easy descending glide on /n/ utilized in conjunction with relaxed jaw, tongue, and lightly closed lips to facilitate forward resonant sound  Use of the carrier phrase \"nnn\" instructed to promote generalization to everyday speech  Syllable level using /n/ in alternation with cardinal vowels on sustained pitches and speech inflection  Word level exercises featuring nasal continuant loaded stimuli  Phrase level exercises featuring nasal continuants in more complex phonemic contexts were employed  Instructed with and interval of a descending glide pattern was facilitating, prior to progressing to comfortable speech using optimal breath flow.  Able to recognize improvement in quality and comfort    Counseling and Education:  Asked many questions about the nature of her symptoms, and I answered all of these thoroughly.  A revised regimen for home practice was instructed.  I provided an AVS of today's therapeutic activities to facilitate practice.    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 Ms. Slater demonstrates and reports very good learning.  She feels that her voice has significantly improved and rates her " voice at a 7+ out of 10, and 10 appropriately matched her identity.  Today we focused on listening voice techniques to help further optimize her voice.  She demonstrated very good understanding, application, and progress today.    PLAN: I will see Ms. Slater in 2 weeks, at which point we will continue therapy.   For practice goals see VICENTE     Next Clinic Appt: 8/5/2024  Plan for SLP: Continue to apply techniques flow for resident voice, and also techniques to optimize coordination between breath flow and phonation      TOTAL SERVICE TIME:   Call Initiated at: 10:05 AM  Call Ended at: 11 AM           CPT Billing Codes:   TREATMENT OF SPEECH, LANGUAGE, VOICE, COMMUNICATION, and/or AUDITORY PROCESSING DISORDER (84777)    Ernestine Navas M.M. (voice), MKATELYNN., CCC/SLP  Speech-Language Pathologist  [facility] Trained Vocologist  [facility]  [phone]  [email or web address]  Pronouns: she/her      *this report was created in part through the use of computerized dictation software, and though reviewed following completion, some typographic errors may persist.  If there is confusion regarding any of this notes contents, please contact me for clarification            Again, thank you for allowing me to participate in the care of your patient.      Sincerely,    Ernestine Navas, SLP

## 2024-07-30 ENCOUNTER — VIRTUAL VISIT (OUTPATIENT)
Dept: PSYCHOLOGY | Facility: CLINIC | Age: 29
End: 2024-07-30
Payer: COMMERCIAL

## 2024-07-30 DIAGNOSIS — F33.3 MAJOR DEPRESSIVE DISORDER, RECURRENT, SEVERE WITH PSYCHOTIC FEATURES (H): ICD-10-CM

## 2024-07-30 DIAGNOSIS — F41.1 GENERALIZED ANXIETY DISORDER: ICD-10-CM

## 2024-07-30 DIAGNOSIS — F64.0 GENDER DYSPHORIA IN ADULT: Primary | ICD-10-CM

## 2024-07-30 PROCEDURE — 90837 PSYTX W PT 60 MINUTES: CPT | Mod: 95 | Performed by: MARRIAGE & FAMILY THERAPIST

## 2024-07-30 NOTE — PROGRESS NOTES
[facility] - Progress Note    Date of Service: 24   Name: Kd Slater  : 1995  Medical Record Number: [id number]  Treating Provider: Tenisha Stewart PsyD  Type of Session: Individual  Present in Session: Client  Session Start and Stop Time: 1010:55  Number of Minutes:  55    SERVICE MODALITY:  Video Visit:      Provider verified identity through the following two step process.  Patient provided:  Patient was verified at admission/transfer    Telemedicine Visit: The patient's condition can be safely assessed and treated via synchronous audio and visual telemedicine encounter.      Reason for Telemedicine Visit: Patient convenience (e.g. access to timely appointments / distance to available provider)    Originating Site (Patient Location): Patient's home    Distant Site (Provider Location): [facility] AND [facility] CLINIC    Consent:  The patient/guardian has verbally consented to: the potential risks and benefits of telemedicine (video visit) versus in person care; bill my insurance or make self-payment for services provided; and responsibility for payment of non-covered services.     Patient would like the video invitation sent by:  My Chart    Mode of Communication:  Video Conference via [facility]    Distant Location (Provider):  On-site    As the provider I attest to compliance with applicable laws and regulations related to telemedicine.    DSM-5 Diagnoses:  Encounter Diagnoses   Name Primary?    Gender dysphoria in adult Yes    Major depressive disorder, recurrent, severe with psychotic features (H)     Generalized anxiety disorder      Current Reported Symptoms and Status update:  Changes since last session- client increased insight around internalized misogyny, masking and navigating repressed feelings, continued anxiety, questioning, fear.     Progress Toward Treatment Goals:   Satisfactory progress     Therapeutic Interventions/Treatment  Strategies:    Area(s) of treatment focus addressed in this session included Gender Health, Develop Socialization , and Cultural     Psychotherapist offered support, feedback and validation, provided redirection, and reinforced use of skills Treatment modalities used include Narrative Therapy Feminist Informed Interpersonal Cognitive Restructuring:  Explored impact of ineffective thoughts / distortions on mood and activity and Assisted patient in formulating new neutral/positive alternatives to challenge less helpful / ineffective thoughts, Symptoms Management: Promoted understanding of their diagnoses and how it impacts their functioning, and Other: Assisted patient  in finding ways to adapt functioning in light of past traumatic experiences and Explored with patient how life transitions may impact mental health and functioning   Support, Redirection, and Feedback    Patient Response:   Patient responded to session by verbalizing understanding and actively engaged  Possible barriers to participation / learning include: N/A    Current Mental Status Exam:   Appearance:  Appropriate   Eye Contact:  Good   Attitude / Demeanor: Cooperative   Speech      Rate / Production: Normal/ Responsive      Volume:  Normal  volume  Orientation:  All  Mood:   Normal  Affect:   Appropriate   Thought Content: Clear   Insight:   Good       Plan/Need for Future Services:  Return for therapy in 1-2 weeks to treat diagnosed problems.    Patient has a current master individualized treatment plan.  See Epic treatment plan for more information.    Referral / Collaboration:  Referral to another professional/service is not indicated at this time..  Emergency Services Needed?  No    Assignment:  None    Interactive Complexity:  There are four specific communication difficulties that complicate the work of the primary psychiatric procedure.  Interactive complexity (+18076) may be reported when at least one of these difficulties is  present.    Communication difficulties present during current the psychiatric procedure include:  None.      Signature/Title:    Tenisha Stewart PsyD

## 2024-07-30 NOTE — NURSING NOTE
Current patient location: [address] [facility] [address]    Is the patient currently in the state of [place]? YES    Visit mode:VIDEO    If the visit is dropped, the patient can be reconnected by: VIDEO VISIT: Text to cell phone:   Telephone Information:   Mobile [phone]    and VIDEO VISIT: Send to e-mail at: [email or web address]    Will anyone else be joining the visit? NO  (If patient encounters technical issues they should call [phone]    How would you like to obtain your AVS? MyChart    Are changes needed to the allergy or medication list? N/A    Are refills needed on medications prescribed by this physician? NO    Reason for visit: RECHNAPOLEON ALFARO

## 2024-08-12 ENCOUNTER — OFFICE VISIT (OUTPATIENT)
Dept: PSYCHOLOGY | Facility: CLINIC | Age: 29
End: 2024-08-12
Payer: COMMERCIAL

## 2024-08-12 DIAGNOSIS — F33.3 MAJOR DEPRESSIVE DISORDER, RECURRENT, SEVERE WITH PSYCHOTIC FEATURES (H): ICD-10-CM

## 2024-08-12 DIAGNOSIS — F64.0 GENDER DYSPHORIA IN ADULT: Primary | ICD-10-CM

## 2024-08-12 PROCEDURE — 90853 GROUP PSYCHOTHERAPY: CPT | Mod: HN | Performed by: PSYCHOLOGIST

## 2024-08-12 NOTE — PROGRESS NOTES
"[facility]    Date of Service: 24    Name: Kd Reyes)  : 1995  Medical Record Number: [id number]  Treating Provider: Candi Mera, PhD   Type of Session: group  Present in Session: Client  Type of Session: Group  Number of Minutes: 120 mins with group members present  Therapist(s): Ernestine Sanders, Doctoral Psychology Intern (PsFiliberto) and Candi Mera, PhD,      Video start time: 6:30 PM  Video end time: 8:30 PM     SERVICE MODALITY:  In Person      DSM-5 Diagnoses:  F64.0 - Gender Dysphoria in Adolescent and Adult   296.33 Recurrent Major depression      Current Reported Symptoms and Status update:  Experiences gender dysphoria;  ups and down with mood and self-perception, experiences paranoia and struggles in interpersonal relationships.      Changes since last session: Reported completing a lot of journaling and practicing guitar to help be more present and increase awareness. She is also working on  herself from others (\"not paying attention to others, they are just talking, and it's not a personal attack on me\").    great progress in reality checking her perceptions; gaining awareness around how to feel more resilient--playing tennis and practicing guitar; heard that previous work site is having to do training    Progress Toward Treatment Goals:   Satisfactory      Therapeutic Interventions/Treatment Strategies:     Area(s) of treatment focus addressed in this session included Symptom Management, Interpersonal Relationship Skills, Gender Health and Wellness       Cognitive behavioral, interpersonal, and group therapy interventions were utilized to explore gender dysphoria (i.e., transition, interpersonal relationships, and medical interventions) and mental health symptoms, specifically depression and anxiety.  Dav requested to take more time, specifically to respond to her here-and-now experience of another group members share around " "\"needing to be more feminine.\" She noted feeling mad and agitated, as it reminded her of early childhood experiences of being disciplined for being feminine. Further, she discussed these beliefs were ingrained by family who were misogynistic and displayed toxic masculinity She noted a distinct memory of family telling her she was \"too ethnic.\"     Treatment modalities used include [facility] THERAPY INTERVENTIONS: Motivational Interviewing Group Dynamic Therapy  Behavioral Activation: Explored how behaviors effect mood and interact with thoughts and feelings;  Promoting gender health and resiliency.      Patient Response:   Patient responded to session by listening and actively engaged.      Possible barriers to participation / learning include: some self-described paranoia      Current Mental Status Exam:   Appearance:  Appropriate   Eye Contact:  Good   Attitude / Demeanor: Cooperative and engaged  Speech      Rate / Production: Normal/ Responsive      Volume:  Normal  volume  Orientation:  All  Mood:   Positive  Affect:   Congruent  Thought Content: Clear   Insight:   Good      Plan/Need for Future Services:  Return for group therapy in 2 weeks to treat diagnosed problems.    Patient has a current master individualized treatment plan.  See Epic treatment plan for more information.     Referral / Collaboration:  Referral to another professional/service is not indicated at this time.  Emergency Services Needed?  No     Assignment:  none     Interactive Complexity:  There are four specific communication difficulties that complicate the work of the primary psychiatric procedure.  Interactive complexity (+89644) may be reported when at least one of these difficulties is present.     Communication difficulties present during current the psychiatric procedure include:  None.       Ernestine Sanders, Doctoral Psychology Intern (PsyD)  "

## 2024-08-13 ENCOUNTER — VIRTUAL VISIT (OUTPATIENT)
Dept: PSYCHOLOGY | Facility: CLINIC | Age: 29
End: 2024-08-13
Payer: COMMERCIAL

## 2024-08-13 DIAGNOSIS — F64.0 GENDER DYSPHORIA IN ADULT: Primary | ICD-10-CM

## 2024-08-13 DIAGNOSIS — F41.1 GENERALIZED ANXIETY DISORDER: ICD-10-CM

## 2024-08-13 DIAGNOSIS — F33.3 MAJOR DEPRESSIVE DISORDER, RECURRENT, SEVERE WITH PSYCHOTIC FEATURES (H): ICD-10-CM

## 2024-08-13 PROCEDURE — 90837 PSYTX W PT 60 MINUTES: CPT | Mod: 95 | Performed by: MARRIAGE & FAMILY THERAPIST

## 2024-08-13 NOTE — NURSING NOTE
Current patient location: [address] [facility] [address]    Is the patient currently in the state of [place]? YES    Visit mode:VIDEO    If the visit is dropped, the patient can be reconnected by: VIDEO VISIT: Text to cell phone:   Telephone Information:   Mobile [phone]    and VIDEO VISIT: Send to e-mail at: [email or web address]    Will anyone else be joining the visit? NO  (If patient encounters technical issues they should call [phone])    How would you like to obtain your AVS? MyChart    Are changes needed to the allergy or medication list? No    Are refills needed on medications prescribed by this physician? NO    Rooming Documentation:  Questionnaire(s) not done per department protocol      Reason for visit: RECHECK    Demi ALFARO

## 2024-08-13 NOTE — PROGRESS NOTES
[facility] - Progress Note    Date of Service: 24   Name: Kd Slater  : 1995  Medical Record Number: [id number]  Treating Provider: Tenisha Stewart PsyD  Type of Session: Individual  Present in Session: Client  Session Start and Stop Time: 10:08-11  Number of Minutes:  52    SERVICE MODALITY:  Video Visit:      Provider verified identity through the following two step process.  Patient provided:  Patient was verified at admission/transfer    Telemedicine Visit: The patient's condition can be safely assessed and treated via synchronous audio and visual telemedicine encounter.      Reason for Telemedicine Visit: Patient convenience (e.g. access to timely appointments / distance to available provider)    Originating Site (Patient Location): Patient's home    Distant Site (Provider Location): [facility] AND [facility] CLINIC    Consent:  The patient/guardian has verbally consented to: the potential risks and benefits of telemedicine (video visit) versus in person care; bill my insurance or make self-payment for services provided; and responsibility for payment of non-covered services.     Patient would like the video invitation sent by:  My Chart    Mode of Communication:  Video Conference via [facility]    Distant Location (Provider):  On-site    As the provider I attest to compliance with applicable laws and regulations related to telemedicine.    DSM-5 Diagnoses:  Encounter Diagnoses   Name Primary?    Gender dysphoria in adult Yes    Major depressive disorder, recurrent, severe with psychotic features (H)     Generalized anxiety disorder      Current Reported Symptoms and Status update:  Changes since last session- client reports fluctuations in mood, anxiety, paranoia, dysphoria.  Daily journaling, increased insight.   Social anxiety related to gender dysphoria, tightness in chest, inability to breath deeply.     Progress Toward Treatment Goals:    Satisfactory progress     Therapeutic Interventions/Treatment Strategies:    Area(s) of treatment focus addressed in this session included Symptom Management, Interpersonal Relationship Skills, Sexual Health and Wellness, and Gender Health    Psychotherapist offered support, feedback and validation and reinforced use of skills Treatment modalities used include Narrative Therapy Interpersonal Radical Healing  Other: Assisted patient  in finding ways to adapt functioning in light of past traumatic experiences, Explored with patient how life transitions may impact mental health and functioning , and explored upbringing and internalized homophobia, transphobia, Relationship Skills: Discussed strategies to promote healthier understanding of interpersonal relationships, and discussed gender literacy and explored challenging unrealistic expectations of self/others  Support, Redirection, and Feedback    Patient Response:   Patient responded to session by verbalizing understanding and actively engaged  Possible barriers to participation / learning include: N/A    Current Mental Status Exam:   Appearance:  Appropriate   Eye Contact:  Good   Attitude / Demeanor: Cooperative   Speech      Rate / Production: Normal/ Responsive      Volume:  Normal  volume  Orientation:  All  Mood:   Normal  Affect:   Appropriate   Thought Content: Clear   Insight:   Good       Plan/Need for Future Services:  Return for therapy in 2 weeks to treat diagnosed problems.    Patient has a current master individualized treatment plan.  See Epic treatment plan for more information.    Referral / Collaboration:  Referral to another professional/service is not indicated at this time..  Emergency Services Needed?  No    Assignment:  None    Interactive Complexity:  There are four specific communication difficulties that complicate the work of the primary psychiatric procedure.  Interactive complexity (+41872) may be reported when at least one of these  difficulties is present.    Communication difficulties present during current the psychiatric procedure include:  None.      Signature/Title:    Tenisha Stewart PsyD

## 2024-08-19 ENCOUNTER — VIRTUAL VISIT (OUTPATIENT)
Dept: OTOLARYNGOLOGY | Facility: CLINIC | Age: 29
End: 2024-08-19
Payer: COMMERCIAL

## 2024-08-19 DIAGNOSIS — R49.9 VOICE AND RESONANCE DISORDER: ICD-10-CM

## 2024-08-19 DIAGNOSIS — R49.0 DYSPHONIA: Primary | ICD-10-CM

## 2024-08-19 DIAGNOSIS — F64.0 GENDER DYSPHORIA IN ADULT: ICD-10-CM

## 2024-08-19 PROCEDURE — 92507 TX SP LANG VOICE COMM INDIV: CPT | Mod: GN | Performed by: SPEECH-LANGUAGE PATHOLOGIST

## 2024-08-19 NOTE — PATIENT INSTRUCTIONS
"After Visit Summary    Patient: Dav Slater  Date of Visit: 8/19/2024    These notes are also available in your MyChart. Please take a few moments to find them under \"Past Appointments\" in the [employer] system, as Ernestine will start to phase out e-mail communications.    \"Handouts\" that go along with today's order of activities include (below):   Frequency of practice: 2-3x/day unless marked otherwise    Order of today's appointment:  Find your mindful moments (every 2-3 hours, or every time you go to the bathroom): 60 sec of mindfully slowing down - speaking, breathing, etc  Optional: humming work and/or breath counting or the months of the year (below).    Sip water throughout the day.    Forward Resonant /m/ sound (\"humming work\")      WHY:  Though these exercises seems (and feels) silly they are helpful for a number of reasons.  First, they make you use your air generously and consistently, helping you to coordinate your breath and your voice.  Second, they lengthen and narrow the vocal tract.  This narrowing (or semi-occlusion in scientific terms) creates back pressure in your throat which has been shown to help the vocal folds vibrate more easily and reduce how hard some of the other muscles are squeezing.    HOW:    Use a gentle  mmmm  like you are relaxing into a comfy chair at the end of a long day.   Make sure that your jaw is released (never clenched), and that your tongue is hanging out like a rug on the floor of your mouth  There can be a tiny  h  at the start of it to keep you from getting too tight.  Start off with the  sigh  pattern listed below.    Feel how open and relaxed your throat feels when you practice these sounds. If it gets tight, don't sweat it.  Just breath, relax, and start again.  Across all the exercises make sure you are getting a nice low breath and feeling the steady inward motion of low abdominal muscles when making sound.      Practice 5 times a day for no more than 3 minutes, " and whenever you feel fatigued.    Aren't sure if you are doing it right? Ask yourself these three questions:  Does it feel easy?  Are the bubbles and voice consistent?  Do you feel that forward buzz?            What sounds to make: practice 3x each    Single pitches - use any comfortable pitch and sustain the note for as long as it feels free and easy, and your lips or tongue are bubbling.        Sighs - glide from high to low like you are sitting down in a comfortable chair after a long day of work        [place] - Start on a medium pitch and glide up just a bit and back down      The voice you have any just you have related no one because oftentimes to just the voice in my head yeah it can use voice is does not always voice sounds I think you might be at a place where you could do okay with this but some of my clients the biggest thing is getting around vocal dysphoria because I think to internalize it you have to be able to listen to your self I can imagine any other way to do it then to be able to listen to yourself ENL on a regular basis which is why I say reading aloud is coming in the option and you can did you not had a focus on the voice but maybe how does it feel is a easy is it coming out clear is it coming out the way you wanted to interesting my microphone just turned itself        I KNOW AN OLD LADY (be mindful and slow down)    I know an old lady who swallowed a fly.  I don't know why she swallowed a fly.    I know an old lady who swallowed a spider   that wriggled and wiggled and tickled inside her.  She swallowed the spider to catch the fly.  I don't know why she swallowed the fly.    I know an old lady who swallowed a bird.  How absurd to swallow a bird!  She swallowed the bird to catch the spider   that wriggled and wiggled and tickled inside her.  She swallowed the spider to catch the fly.  I don't know why she swallowed the fly.    I know an old lady who swallowed a cat.  Imagine that, swallowing  "a cat!  She swallowed the cat to catch the bird.  How absurd to swallow a bird!  She swallowed the bird to catch the spider   that wriggled and wiggled and tickled inside her.  She swallowed the spider to catch the fly.  I don't know why she swallowed the fly.    I know an old lady who swallowed a dog.  What a hog, to swallow a dog!  She swallowed the dog to catch the cat.  Imagine that, swallowing a cat!  She swallowed the cat to catch the bird.  How absurd to swallow a bird!  She swallowed the bird to catch the spider   that wriggled and wiggled and tickled inside her.  She swallowed the spider to catch the fly.  I don't know why she swallowed the fly.    I know an old lady who swallowed a cow.  I don't know how she swallowed a cow.  She swallowed the cow to catch the dog.  What a hog, to swallow a dog!  She swallowed the dog to catch the cat.  Imagine that, swallowing a cat!  She swallowed the cat to catch the bird.  How absurd to swallow a bird!  She swallowed the bird to catch the spider   that wriggled and wiggled and tickled inside her.  She swallowed the spider to catch the fly.  I don't know why she swallowed the fly.      Coordinate sipping breath with counting (1-2x/day):  Breathe in through rounded lips with tongue behind lower teeth or touching at the bump behind upper teeth:  Breathe in through rounded lips, then speak \"1\"  Breathe in through rounded lips, then speak  \"1-2\"  Breathe in through rounded lips, then speak  \"1-2-3\"  Breathe in through rounded lips, then speak  \"1-2-3-4\"... up to 5, and then keep going up to 10  Then, try the same with the months of the year.      KEEP READING BILLBOARDS    Plan: read and speak aloud with intentional practice to use and hear your optimal voice.    Ernestine Navas M.M. (voice), M.A., CCC/SLP  Speech-Language Pathologist  [facility] Certified Vocologist  [facility]  [email or web address]  she/her    "

## 2024-08-19 NOTE — PROGRESS NOTES
"Bryon is a 29 year old adult who is being cared for via a billable virtual visit.        The patient/client has been notified and verbally consented to the following statements:   This video visit will be conducted between you and your provider.  If during the course of the call the provider feels a video visit is not appropriate, you will not be charged for this service.    Provider has received verbal consent for billable virtual visit from the patient? Yes    Preferred method for receiving information: [employer]     Call initiated at: 10:06 AM  Platform used to conduct today's virtual appointment: AM Well Video  Location of provider: Residence  Location of patient: Residence. 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    Remarkable findings included:  Perceptual evaluation demonstrated:   Roughness: WNL  Breathiness: WNL  Strain: WNL  Pitch:  F0/Habitual/Conversational speech: informally judged as WNL  Laryngeal exam demonstrated: Not warranted  Primary complaint of patient today included: Seeking of voice and that is comfortable, authentic, and safe.     Additional assessment based discussion included:  I encouragedDav to listen to the body and take breaks, as needed.      Therefore, we recommended a course of speech therapy to address these concerns.\"        SUBJECTIVE:  Since the last appointment, Ms. Slater reports the following:   Overall she reports that symptoms continue to improve  Building muscle memory and consistency  Worst when anxious,congested " "feeling.      OBJECTIVE:  Ms. Slater presents today with the following:  Voice quality:  Speaking voice:  Roughness: WNL  Breathiness: WNL  Strain: WNL  Pitch:  F0/Habitual/Conversational speech: informally judged as WNL      PATIENT REPORTED MEASURES:  Patient Supplied Answers To SLP QOL Questionnaire       No data to display              Patient Supplied Answers To VHI Questionnaire      7/26/2024     9:46 AM   Voice Handicap Index (VHI-10)   My voice makes it difficult for people to hear me 1   People have difficulty understanding me in a noisy room 1   My voice difficulties restrict my personal and social life.  1   I feel left out of conversations because of my voice 0   My voice problem causes me to lose income 0   I feel as though I have to strain to produce voice 0   The clarity of my voice is unpredictable 2   My voice problem upsets me 2   My voice makes me feel handicapped 0   People ask, \"What's wrong with your voice?\" 0   VHI-10 7         THERAPEUTIC ACTIVITIES  Demonstrated previous exercises.  demonstrated improved technique  appropriate redirection provided  instruction provided for increased level of complexity/difficulty    Exercises to promote optimal respiratory mechanics  With clinician support, patient was able to demonstrate improved abdominal relaxation and engagement on inhalation  [place] a respiratory pacing exercise; this was helpful  acceptable improvement in airflow and respiratory mechanics    Instructed in techniques to improve length of utterance with reduced effort for optimal carryover.  Instructed with paragraphs of increasing length before progressing to semi-scripted conversation tasks.   Developed a mental checklist of factors to help trouble shoot moments of difficulty during daily speaking tasks.    Counseling and Education:  Asked many questions about the nature of her symptoms, and I answered all of these thoroughly.  A revised regimen for home practice was " instructed.  I provided an AVS of today's therapeutic activities to facilitate practice.    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 Ms. Slater continues to demonstrate good ongoing progress of techniques to optimize coordination between breath flow and phonation.     PLAN: I will see Ms. Slater in September.  For practice goals see AVS.     Next Clinic Appt: 9/26    TOTAL SERVICE TIME:   Call Initiated at: 10:06 AM  Call Ended at: 11am           CPT Billing Codes:   TREATMENT OF SPEECH, LANGUAGE, VOICE, COMMUNICATION, and/or AUDITORY PROCESSING DISORDER (09321)    Ernestine Navas M.M. (voice), M.A., CCC/SLP  Speech-Language Pathologist  [facility] Trained Vocologist  [facility]  [phone]  [email or web address]  Pronouns: she/her      *this report was created in part through the use of computerized dictation software, and though reviewed following completion, some typographic errors may persist.  If there is confusion regarding any of this notes contents, please contact me for clarification

## 2024-08-19 NOTE — LETTER
"8/19/2024       RE: Kd Slater  [address] [place] [address]     Dear Colleague,    Thank you for referring your patient, Kd Slater, to the [facility] VOICE CL[facility] [place] at [facility]. Please see a copy of my visit note below.    Bryon is a 29 year old adult who is being cared for via a billable virtual visit.        The patient/client has been notified and verbally consented to the following statements:   This video visit will be conducted between you and your provider.  If during the course of the call the provider feels a video visit is not appropriate, you will not be charged for this service.    Provider has received verbal consent for billable virtual visit from the patient? Yes    Preferred method for receiving information: [employer]     Call initiated at: 10:06 AM  Platform used to conduct today's virtual appointment: AM Well Video  Location of provider: Residence  Location of patient: Residence. 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    Remarkable findings included:  Perceptual evaluation demonstrated:   Roughness: WNL  Breathiness: WNL  Strain: WNL  Pitch:  F0/Habitual/Conversational speech: informally judged as WNL  Laryngeal exam demonstrated: Not warranted  Primary complaint of patient today included: Seeking of voice and that is comfortable, authentic, and safe.     Additional assessment based discussion included:  I encouragedDav to listen to the body and take " "breaks, as needed.      Therefore, we recommended a course of speech therapy to address these concerns.\"        SUBJECTIVE:  Since the last appointment, Ms. Slater reports the following:   Overall she reports that symptoms continue to improve  Building muscle memory and consistency  Worst when anxious,congested feeling.      OBJECTIVE:  Ms. Slater presents today with the following:  Voice quality:  Speaking voice:  Roughness: WNL  Breathiness: WNL  Strain: WNL  Pitch:  F0/Habitual/Conversational speech: informally judged as WNL      PATIENT REPORTED MEASURES:  Patient Supplied Answers To SLP QOL Questionnaire       No data to display              Patient Supplied Answers To VHI Questionnaire      7/26/2024     9:46 AM   Voice Handicap Index (VHI-10)   My voice makes it difficult for people to hear me 1   People have difficulty understanding me in a noisy room 1   My voice difficulties restrict my personal and social life.  1   I feel left out of conversations because of my voice 0   My voice problem causes me to lose income 0   I feel as though I have to strain to produce voice 0   The clarity of my voice is unpredictable 2   My voice problem upsets me 2   My voice makes me feel handicapped 0   People ask, \"What's wrong with your voice?\" 0   VHI-10 7         THERAPEUTIC ACTIVITIES  Demonstrated previous exercises.  demonstrated improved technique  appropriate redirection provided  instruction provided for increased level of complexity/difficulty    Exercises to promote optimal respiratory mechanics  With clinician support, patient was able to demonstrate improved abdominal relaxation and engagement on inhalation  [place] a respiratory pacing exercise; this was helpful  acceptable improvement in airflow and respiratory mechanics    Instructed in techniques to improve length of utterance with reduced effort for optimal carryover.  Instructed with paragraphs of increasing length before progressing to " semi-scripted conversation tasks.   Developed a mental checklist of factors to help trouble shoot moments of difficulty during daily speaking tasks.    Counseling and Education:  Asked many questions about the nature of her symptoms, and I answered all of these thoroughly.  A revised regimen for home practice was instructed.  I provided an AVS of today's therapeutic activities to facilitate practice.    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 Ms. Slater continues to demonstrate good ongoing progress of techniques to optimize coordination between breath flow and phonation.     PLAN: I will see Ms. Slater in September.  For practice goals see AVS.     Next Clinic Appt: 9/26    TOTAL SERVICE TIME:   Call Initiated at: 10:06 AM  Call Ended at: 11am           CPT Billing Codes:   TREATMENT OF SPEECH, LANGUAGE, VOICE, COMMUNICATION, and/or AUDITORY PROCESSING DISORDER (40112)    Ernestine Navas M.M. (voice) MKATELYNN., CCC/SLP  Speech-Language Pathologist  formerly [facility] Trained Vocologist  [facility]  [phone]  [email or web address]  Pronouns: she/her      *this report was created in part through the use of computerized dictation software, and though reviewed following completion, some typographic errors may persist.  If there is confusion regarding any of this notes contents, please contact me for clarification            Again, thank you for allowing me to participate in the care of your patient.      Sincerely,    Ernestine Navas, SLP

## 2024-08-26 ENCOUNTER — OFFICE VISIT (OUTPATIENT)
Dept: PSYCHOLOGY | Facility: CLINIC | Age: 29
End: 2024-08-26
Payer: COMMERCIAL

## 2024-08-26 DIAGNOSIS — F41.1 GENERALIZED ANXIETY DISORDER: ICD-10-CM

## 2024-08-26 DIAGNOSIS — F33.3 MAJOR DEPRESSIVE DISORDER, RECURRENT, SEVERE WITH PSYCHOTIC FEATURES (H): ICD-10-CM

## 2024-08-26 DIAGNOSIS — F64.0 GENDER DYSPHORIA IN ADULT: Primary | ICD-10-CM

## 2024-08-26 PROCEDURE — 90853 GROUP PSYCHOTHERAPY: CPT | Performed by: PSYCHOLOGIST

## 2024-08-26 NOTE — PROGRESS NOTES
[place] [facility] - Progress Note    Date of Service: 24      Name: Kd Slater (Dav)  : 1995  Medical Record Number: [id number]  Treating Provider: Candi Mera, PhD   Type of Session: group  Present in Session: Client with other group members  Type of Session: Group  Number of Minutes: 120 mins   Therapist(s): Candi Mera, PhD, LP     Video start time: 6:30 PM  Video end time: 8:30 PM     SERVICE MODALITY:  In Person      DSM-5 Diagnoses:  F64.0 - Gender Dysphoria in Adolescent and Adult   296.33 Recurrent Major depression      Current Reported Symptoms and Status update:  Experiences gender dysphoria;  ups and down with mood and self-perception, experiences paranoia and struggles in interpersonal relationships.      Changes since last session: Has moved and is also dating someone where walked in a space with and did not feel compartmentalized. Had a good, vulnerable, intimacy-inducing conversation with long-term friend.     Progress Toward Treatment Goals:   Satisfactory      Therapeutic Interventions/Treatment Strategies:     Area(s) of treatment focus addressed in this session included Symptom Management, Interpersonal Relationship Skills, Gender Health and Wellness       Cognitive behavioral, interpersonal, and group therapy interventions were utilized to explore gender dysphoria (i.e., transition, interpersonal relationships, and medical interventions) and mental health symptoms, specifically depression and anxiety.  Dav shared insight into how she is better able to show up and be authentic--experiencing pockets of this more often. Recognizing how self-messages about not being smart influenced how she interacted.  Took time to celebrate the good things that are happening in her life with the group and took in positive validation well.     Treatment modalities used include [facility] THERAPY INTERVENTIONS: Motivational Interviewing Group Dynamic Therapy  Behavioral  Activation: Explored how behaviors effect mood and interact with thoughts and feelings;  Promoting gender health and resiliency.      Patient Response:   Patient responded to session by listening and actively engaged.      Possible barriers to participation / learning include: some self-described paranoia      Current Mental Status Exam:   Appearance:  Appropriate   Eye Contact:  Good   Attitude / Demeanor: Cooperative and engaged  Speech      Rate / Production: Normal/ Responsive      Volume:  Normal  volume  Orientation:  All  Mood:   Positive  Affect:   Congruent  Thought Content: Clear   Insight:   Good      Plan/Need for Future Services:  Return for group therapy in 2 weeks to treat diagnosed problems.    Patient has a current master individualized treatment plan.  See Epic treatment plan for more information.     Referral / Collaboration:  Referral to another professional/service is not indicated at this time.  Emergency Services Needed?  No     Assignment:  none     Interactive Complexity:  There are four specific communication difficulties that complicate the work of the primary psychiatric procedure.  Interactive complexity (+13384) may be reported when at least one of these difficulties is present.     Communication difficulties present during current the psychiatric procedure include:  None.       Candi Mera, PhD  Licensed Psychologist

## 2024-09-06 ENCOUNTER — VIRTUAL VISIT (OUTPATIENT)
Dept: OTOLARYNGOLOGY | Facility: CLINIC | Age: 29
End: 2024-09-06
Payer: COMMERCIAL

## 2024-09-06 DIAGNOSIS — R49.9 VOICE AND RESONANCE DISORDER: ICD-10-CM

## 2024-09-06 DIAGNOSIS — F64.0 GENDER DYSPHORIA IN ADULT: ICD-10-CM

## 2024-09-06 DIAGNOSIS — R49.0 DYSPHONIA: Primary | ICD-10-CM

## 2024-09-06 PROCEDURE — 92507 TX SP LANG VOICE COMM INDIV: CPT | Mod: GN | Performed by: SPEECH-LANGUAGE PATHOLOGIST

## 2024-09-06 NOTE — PATIENT INSTRUCTIONS
"After Visit Summary    Patient: Dav Slater  Date of Visit: 9/6/2024    These notes are also available in your MyChart. Please take a few moments to find them under \"Past Appointments\" in the [employer] system, as Ernestine will start to phase out e-mail communications.    \"Handouts\" that go along with today's order of activities include (below):   Frequency of practice: 2-3x/day unless marked otherwise    Order of today's appointment:    (See Tips for topical hydration below)  Try a song: Happy Birthday, or other song.        - Keep laughing   - cough/ throat clear - cough up      If clearing mucus, try a \"Lip trill\" or \"lip buzz\" (\"Like a horse\")      Watch your head and it's involvement in wanting to \"push upward.\" Don't push your head forward (keep your spine straight).           Phrases for Blending  Fall_over_the_chair                      Go_(w)into the store  Leave_on_the_lights                     The(e)_(y)only one  See_it_over_there                         The(e)_(y)other day  Do_it_now     Not_even her mom  Put(d)_on_your_shoes         Not_any more  Down_under_the_stairs  Cold_as ice  Not_old_enough   the ice is cold  Look_at_the_sky   he's (z)ill with the flu  The_end_of_the_story  yes_and no  High_up_on_the_shelf  one at a time      When the sunlight strikes raindrops in the air, they act like a prism and form a rainbow.  The rainbow is a division of white light into many beautiful colors.  These take the shape of a long round arch, with its path high above, and its two ends apparently beyond the horizon.       Ernestine Navas M.M. (voice), M.A., CCC/SLP  Speech-Language Pathologist  [facility] Certified Vocologist  [facility]  [email or web address]  she/her         Zbigniew Padilla Jr., M.POWER Farmer M.D.     Wendy Macario M.D.       Laurel Alvarez, Ph.D.  Ernestine Navas M.M., MKATELYNN.                 Travon Barney M.M., M.A.           Hannah Avilez, B.M. M.S.         [phone]   "      Ernestine Navas    Tips to Improve Topical Hydration and Reduce Laryngeal Irritation    Nasal Irrigation:  morning and night    times a day  Saline nasal spray ___________ per day  Saline gel  Gargle:  Using Saline    morning and night     times a day   With plain water (room temp or warm)  Throughout the day (2-3x/day, yasmany. after meals, or when having increased throat irritation symptoms).  To help reduce throat irritation, the feeling of mucus in the throat, improve topical hydration and to reduce throat clearing, coughing.  Saline Recipe: For garglin-8 oz glass   warm water   tap or distilled (your preference)  feel for right temperature (not too hot or cold)  warm enough to dissolve the salt  1/4 tsp. kosher salt, or sea salt   Additives in table salt can cause irritation/ discomfort.  1/4 tsp. baking soda  (OR alternate salt and soda with one saline packet) - Neilmed is a common brand found in the sinus aisle.        Nasal Dryness:  For light congestion, post-nasal drainage, dryness in the nasal passages, try a saline nasal spray (very cheap - any brand).  Saline nasal gel, like Ayr, or NasoGEL are also very helpful. [place] can be found at [employer], but there are many similar products to be found in the allergy/nasal aisle of any drug or retail store.  Saline gels also help with colds/flu, allergies, winter dryness, low humidity, CPAP/BiPaP, chronic Sinusitis, flying, nosebleeds (a.k.a.: epistaxis), and oxygen therapy.    Lozenges:                     Non-Menthol drops are recommended since menthol is        drying, which includes (regular or sugar-free):          Estella Desai Brebenjamin, [employer] Brothers, Life Savers, [place]                                           Ranchers.  Most Ricola drops have menthol (check the                                          package before you buy). Everything in moderation -        maintain good oral hygiene and avoid cavities.  [place] (xlear.com) and other xylitol  products are also recommended.    Humidifiers:   I recommend Abrams or Morgan, which tend to be quieter than other brands.  Use especially while sleeping, for improved nasal/ oral topical hydration, and improved sleep.  Please consider purchasing one at the end of the season (March/April), if you cannot afford one now.      Consider the humidifier for use especially at night time when there is minimal systemic hydration.  Fill with enough water for 1-2 nights; clean base and water container at least once per week with a water/vinegar mixture.  Always follow instructions as directed by .        Steam:   Facial steamers/ warm, moist washcloth ______x/day for ________minutes.

## 2024-09-06 NOTE — LETTER
"9/6/2024       RE: Kd Slater  [address]  [facility] [address]     Dear Colleague,    Thank you for referring your patient, Kd Slater, to the [facility]ICE [facility]IN[facility] [place] [facility]. Please see a copy of my visit note below.    Bryon is a 29 year old adult who is being cared for via a billable virtual visit.        The patient/client has been notified and verbally consented to the following statements:   This video visit will be conducted between you and your provider.  If during the course of the call the provider feels a video visit is not appropriate, you will not be charged for this service.    Provider has received verbal consent for billable virtual visit from the patient? Yes    Preferred method for receiving information: Balloon     Call initiated at: 10:04 AM  Platform used to conduct today's virtual appointment: AM Well Video  Location of provider: Residence  Location of patient: Residence. 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    Remarkable findings included:  Perceptual evaluation demonstrated:   Roughness: WNL  Breathiness: WNL  Strain: WNL  Pitch:  F0/Habitual/Conversational speech: informally judged as WNL  Laryngeal exam demonstrated: Not warranted  Primary complaint of patient today included: Seeking of voice and that is comfortable, authentic, and safe.     Additional " "assessment based discussion included:  I encouraged, Dav to listen to the body and take breaks, as needed.      Therefore, we recommended a course of speech therapy to address these concerns.\"    SUBJECTIVE:  Since the last appointment, Ms. Slater reports the following:   Overall she reports that symptoms continue to improve  Building muscle memory and consistency    OBJECTIVE:  Ms. Slater presents today with the following:  Voice quality:  Speaking voice:  Roughness: WNL  Breathiness: WNL  Strain: WNL  Pitch:  F0/Habitual/Conversational speech: informally judged as WNL    PATIENT REPORTED MEASURES:  Patient Supplied Answers To SLP QOL Questionnaire       No data to display              Patient Supplied Answers To VHI Questionnaire      9/6/2024     9:46 AM   Voice Handicap Index (VHI-10)   My voice makes it difficult for people to hear me 2   People have difficulty understanding me in a noisy room 2   My voice difficulties restrict my personal and social life.  2   I feel left out of conversations because of my voice 1   My voice problem causes me to lose income 1   I feel as though I have to strain to produce voice 1   The clarity of my voice is unpredictable 3   My voice problem upsets me 2   My voice makes me feel handicapped 1   People ask, \"What's wrong with your voice?\" 1   VHI-10 16     THERAPEUTIC ACTIVITIES  Demonstrated previous exercises.  demonstrated improved technique  appropriate redirection provided  instruction provided for increased level of complexity/difficulty  Modified resonant initial words.     Exercises and techniques for optimal vocal hygiene including:  Systemic hydration, including strategies for increasing daily water intake  Topical hydration - Gargling (saline and plain water), saline nasal irrigation, humidification, steam, guaifenesin to reduce the thickened secretions / laryngeal irritation.    Exercises in techniques for improved airflow during phonation  Speech " material with /ju/ glides was facilitating at the word level.  Progressed to easy onset/ flow and blending phrases  Instructed with a descending glide pattern; this was helpful.  Hitchcock techniques to reduce glottal moncada and improve breath flow; negative practice improved awareness today.    Counseling and Education:  Asked many questions about the nature of her symptoms, and I answered all of these thoroughly.  A revised regimen for home practice was instructed.  I provided an AVS of today's therapeutic activities to facilitate practice.      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 Ms. Slater  continues to demonstrate good progress of therapeutic activities provided to optimize breath flow.     PLAN: I will see her in October     Next Clinic Appt: 10/14    TOTAL SERVICE TIME:   Call Initiated at: 10:04 AM  Call Ended at: 11am           CPT Billing Codes:   TREATMENT OF SPEECH, LANGUAGE, VOICE, COMMUNICATION, and/or AUDITORY PROCESSING DISORDER (60155)    Ernestine Navas M.M. (voice), M.A., CCC/SLP  Speech-Language Pathologist  [facility] Trained Vocologist  [facility]  [phone]  [email or web address]  Pronouns: she/her      *this report was created in part through the use of computerized dictation software, and though reviewed following completion, some typographic errors may persist.  If there is confusion regarding any of this notes contents, please contact me for clarification            Again, thank you for allowing me to participate in the care of your patient.      Sincerely,    Ernestine Navas, SLP

## 2024-09-06 NOTE — PROGRESS NOTES
"Bryon is a 29 year old adult who is being cared for via a billable virtual visit.        The patient/client has been notified and verbally consented to the following statements:   This video visit will be conducted between you and your provider.  If during the course of the call the provider feels a video visit is not appropriate, you will not be charged for this service.    Provider has received verbal consent for billable virtual visit from the patient? Yes    Preferred method for receiving information: Textbrokerhart     Call initiated at: 10:04 AM  Platform used to conduct today's virtual appointment: AM Well Video  Location of provider: Residence  Location of patient: Residence. 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    Remarkable findings included:  Perceptual evaluation demonstrated:   Roughness: WNL  Breathiness: WNL  Strain: WNL  Pitch:  F0/Habitual/Conversational speech: informally judged as WNL  Laryngeal exam demonstrated: Not warranted  Primary complaint of patient today included: Seeking of voice and that is comfortable, authentic, and safe.     Additional assessment based discussion included:  I encouragedDav to listen to the body and take breaks, as needed.      Therefore, we recommended a course of speech therapy to address these concerns.\"    SUBJECTIVE:  Since the last appointment, Ms. Slater reports the following:   Overall she reports that symptoms continue to " "improve  Building muscle memory and consistency    OBJECTIVE:  Ms. Slater presents today with the following:  Voice quality:  Speaking voice:  Roughness: WNL  Breathiness: WNL  Strain: WNL  Pitch:  F0/Habitual/Conversational speech: informally judged as WNL    PATIENT REPORTED MEASURES:  Patient Supplied Answers To SLP QOL Questionnaire       No data to display              Patient Supplied Answers To VHI Questionnaire      9/6/2024     9:46 AM   Voice Handicap Index (VHI-10)   My voice makes it difficult for people to hear me 2   People have difficulty understanding me in a noisy room 2   My voice difficulties restrict my personal and social life.  2   I feel left out of conversations because of my voice 1   My voice problem causes me to lose income 1   I feel as though I have to strain to produce voice 1   The clarity of my voice is unpredictable 3   My voice problem upsets me 2   My voice makes me feel handicapped 1   People ask, \"What's wrong with your voice?\" 1   VHI-10 16     THERAPEUTIC ACTIVITIES  Demonstrated previous exercises.  demonstrated improved technique  appropriate redirection provided  instruction provided for increased level of complexity/difficulty  Modified resonant initial words.     Exercises and techniques for optimal vocal hygiene including:  Systemic hydration, including strategies for increasing daily water intake  Topical hydration - Gargling (saline and plain water), saline nasal irrigation, humidification, steam, guaifenesin to reduce the thickened secretions / laryngeal irritation.    Exercises in techniques for improved airflow during phonation  Speech material with /ju/ glides was facilitating at the word level.  Progressed to easy onset/ flow and blending phrases  Instructed with a descending glide pattern; this was helpful.  [place] techniques to reduce glottal moncada and improve breath flow; negative practice improved awareness today.    Counseling and Education:  Asked many " questions about the nature of her symptoms, and I answered all of these thoroughly.  A revised regimen for home practice was instructed.  I provided an AVS of today's therapeutic activities to facilitate practice.      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 Ms. Slater  continues to demonstrate good progress of therapeutic activities provided to optimize breath flow.     PLAN: I will see her in October     Next Clinic Appt: 10/14    TOTAL SERVICE TIME:   Call Initiated at: 10:04 AM  Call Ended at: 11am           CPT Billing Codes:   TREATMENT OF SPEECH, LANGUAGE, VOICE, COMMUNICATION, and/or AUDITORY PROCESSING DISORDER (93733)    Ernestine Navas M.M. (voice), M.A., CCC/SLP  Speech-Language Pathologist  [facility] Trained Vocologist  [facility]  [phone]  [email or web address]  Pronouns: she/her      *this report was created in part through the use of computerized dictation software, and though reviewed following completion, some typographic errors may persist.  If there is confusion regarding any of this notes contents, please contact me for clarification

## 2024-09-09 ENCOUNTER — OFFICE VISIT (OUTPATIENT)
Dept: PSYCHOLOGY | Facility: CLINIC | Age: 29
End: 2024-09-09
Payer: COMMERCIAL

## 2024-09-09 DIAGNOSIS — F33.3 MAJOR DEPRESSIVE DISORDER, RECURRENT, SEVERE WITH PSYCHOTIC FEATURES (H): ICD-10-CM

## 2024-09-09 DIAGNOSIS — F64.0 GENDER DYSPHORIA IN ADULT: Primary | ICD-10-CM

## 2024-09-09 PROCEDURE — 90853 GROUP PSYCHOTHERAPY: CPT | Performed by: PSYCHOLOGIST

## 2024-09-10 NOTE — PROGRESS NOTES
[place] [facility] - Progress Note    Date of Service: 24      Name: Kd Slater (Dav)  : 1995  Medical Record Number: [id number]  Treating Provider: Candi Mera, PhD and Bipin Centeno MS (predoctoral intern)  Type of Session: group  Present in Session: Client with other group members  Type of Session: Group  Number of Minutes: 120 mins   Therapist(s): Candi Mera, PhD, LP     Video start time: 6:30 PM  Video end time: 8:30 PM     SERVICE MODALITY:  In Person      DSM-5 Diagnoses:  F64.0 - Gender Dysphoria in Adolescent and Adult   296.33 Recurrent Major depression      Current Reported Symptoms and Status update:  Experiences gender dysphoria;  ups and down with mood and self-perception, experiences paranoia and struggles in interpersonal relationships.      Changes since last session: reports being regulated and confident in self; not as disturbed by negative experiences of misgendering or hostility     Progress Toward Treatment Goals:   Satisfactory      Therapeutic Interventions/Treatment Strategies:     Area(s) of treatment focus addressed in this session included Symptom Management, Interpersonal Relationship Skills, Gender Health and Wellness       Cognitive behavioral, interpersonal, and group therapy interventions were utilized to explore gender dysphoria (i.e., transition, interpersonal relationships, and medical interventions) and mental health symptoms, specifically depression and anxiety.  Dav introduced herself to the new co-therapist.  Is feeling good about how she is self-regulating. Does not feel she is experiencing much paranoia right now. Did not request time. Listened and provided supportive feedback to others.    Treatment modalities used include [facility] THERAPY INTERVENTIONS: Motivational Interviewing Group Dynamic Therapy  Behavioral Activation: Explored how behaviors effect mood and interact with thoughts and feelings;  Promoting gender health and  resiliency.      Patient Response:   Patient responded to session by listening and actively engaged.      Possible barriers to participation / learning include: some self-described paranoia      Current Mental Status Exam:   Appearance:  Appropriate   Eye Contact:  Good   Attitude / Demeanor: Cooperative and engaged  Speech      Rate / Production: Normal/ Responsive      Volume:  Normal  volume  Orientation:  All  Mood:   Positive  Affect:   Congruent  Thought Content: Clear   Insight:   Good      Plan/Need for Future Services:  Return for group therapy in 2 weeks to treat diagnosed problems.    Patient has a current master individualized treatment plan.  See Epic treatment plan for more information.     Referral / Collaboration:  Referral to another professional/service is not indicated at this time.  Emergency Services Needed?  No     Assignment:  none     Interactive Complexity:  There are four specific communication difficulties that complicate the work of the primary psychiatric procedure.  Interactive complexity (+59085) may be reported when at least one of these difficulties is present.     Communication difficulties present during current the psychiatric procedure include:  None.       Candi Mera, PhD  Licensed Psychologist

## 2024-09-17 ENCOUNTER — VIRTUAL VISIT (OUTPATIENT)
Dept: PSYCHOLOGY | Facility: CLINIC | Age: 29
End: 2024-09-17
Payer: COMMERCIAL

## 2024-09-17 DIAGNOSIS — F41.1 GENERALIZED ANXIETY DISORDER: Primary | ICD-10-CM

## 2024-09-17 DIAGNOSIS — F64.0 GENDER DYSPHORIA IN ADULT: ICD-10-CM

## 2024-09-17 DIAGNOSIS — F33.3 MAJOR DEPRESSIVE DISORDER, RECURRENT, SEVERE WITH PSYCHOTIC FEATURES (H): ICD-10-CM

## 2024-09-17 PROCEDURE — 90837 PSYTX W PT 60 MINUTES: CPT | Mod: 95 | Performed by: MARRIAGE & FAMILY THERAPIST

## 2024-09-17 NOTE — NURSING NOTE
Is the patient currently in the state of [place]? YES    Current patient location: [address] [place] [address] [address]  [facility] [address]    Visit mode:VIDEO    If the visit is dropped, the patient can be reconnected by: VIDEO VISIT:  Send e-mail to at [email or web address]    Will anyone else be joining the visit? No  (If patient encounters technical issues they should call [phone])    How would you like to obtain your AVS? MyChart    Are changes needed to the allergy or medication list? N/A    Are refills needed on medications prescribed by this physician? NO    Rooming Documentation: Questionnaire(s) not done per department protocol.    Reason for visit: RECHECK     MIKE StaffordF

## 2024-09-17 NOTE — PROGRESS NOTES
[place] for Sexual [facility] - Progress Note    Date of Service: 24   Name: Kd Slater  : 1995  Medical Record Number: [id number]  Treating Provider: Tenisha Stewart PsyD  Type of Session: Individual  Present in Session: client  Session Start and Stop Time: 10:10-11  Number of Minutes:  50    SERVICE MODALITY:  Video Visit:      Provider verified identity through the following two step process.  Patient provided:  Patient was verified at admission/transfer    Telemedicine Visit: The patient's condition can be safely assessed and treated via synchronous audio and visual telemedicine encounter.      Reason for Telemedicine Visit: Patient convenience (e.g. access to timely appointments / distance to available provider)    Originating Site (Patient Location): Patient's home    Distant Site (Provider Location): [facility]    Consent:  The patient/guardian has verbally consented to: the potential risks and benefits of telemedicine (video visit) versus in person care; bill my insurance or make self-payment for services provided; and responsibility for payment of non-covered services.     Patient would like the video invitation sent by:  My Chart    Mode of Communication:  Video Conference via [facility]    Distant Location (Provider):  On-site    As the provider I attest to compliance with applicable laws and regulations related to telemedicine.    DSM-5 Diagnoses:  Encounter Diagnoses   Name Primary?    Gender dysphoria in adult     Major depressive disorder, recurrent, severe with psychotic features (H)     Generalized anxiety disorder Yes       Current Reported Symptoms and Status update:  Changes since last session\- - decreased anxiety, increased insight/awareness     Progress Toward Treatment Goals:   Satisfactory progress   New job at [employer], new apartment, new partner.     Therapeutic Interventions/Treatment Strategies:    Area(s) of  treatment focus addressed in this session included Symptom Management, Interpersonal Relationship Skills, Develop Socialization , and Maintenance of Progress      Psychotherapist offered support, feedback and validation, provided redirection, and reinforced use of skills Treatment modalities used include Narrative Therapy Systemic Relational Therapy Interpersonal Symptoms Management: Promoted understanding of their diagnoses and how it impacts their functioning, Emotions Management:  Increased awareness of daily mood patterns/changes, and Relationship Skills: Assisted patients in implementing more effective communication skills in their relationships  Support, Redirection, and Feedback    Patient Response:   Patient responded to session by accepting feedback and actively engaged  Possible barriers to participation / learning include: N/A    Current Mental Status Exam:   Appearance:  Appropriate   Eye Contact:  Good   Attitude / Demeanor: Cooperative   Speech      Rate / Production: Normal/ Responsive      Volume:  Normal  volume  Orientation:  All  Mood:   Normal  Affect:   Appropriate   Thought Content: Clear   Insight:   Good       Plan/Need for Future Services:  Return for therapy in 2weeks to treat diagnosed problems.    Patient has a current master individualized treatment plan.  See Epic treatment plan for more information.    Referral / Collaboration:  Referral to another professional/service is not indicated at this time..  Emergency Services Needed?  No    Assignment:  None    Interactive Complexity:  There are four specific communication difficulties that complicate the work of the primary psychiatric procedure.  Interactive complexity (+57235) may be reported when at least one of these difficulties is present.    Communication difficulties present during current the psychiatric procedure include:  None.      Signature/Title:    Tenisha Stewart PsyD

## 2024-09-23 ENCOUNTER — OFFICE VISIT (OUTPATIENT)
Dept: PSYCHOLOGY | Facility: CLINIC | Age: 29
End: 2024-09-23
Payer: COMMERCIAL

## 2024-09-23 DIAGNOSIS — F64.0 GENDER DYSPHORIA IN ADULT: Primary | ICD-10-CM

## 2024-09-23 DIAGNOSIS — F33.3 MAJOR DEPRESSIVE DISORDER, RECURRENT, SEVERE WITH PSYCHOTIC FEATURES (H): ICD-10-CM

## 2024-09-23 PROCEDURE — 90853 GROUP PSYCHOTHERAPY: CPT | Performed by: PSYCHOLOGIST

## 2024-09-23 NOTE — PROGRESS NOTES
[facility] - Progress Note    Date of Service: 24      Name: Kd Slater (Dav)  : 1995  Medical Record Number: [id number]  Treating Provider: Candi Mera, PhD and Bipin Centeno MS (predoctoral intern)  Type of Session: group  Present in Session: Client with other group members  Type of Session: Group  Number of Minutes: 120 mins   Therapist(s): Candi Mera, PhD, LP     Video start time: 6:30 PM  Video end time: 8:30 PM     SERVICE MODALITY:  In Person      DSM-5 Diagnoses:  F64.0 - Gender Dysphoria in Adolescent and Adult   296.33 Recurrent Major depression      Current Reported Symptoms and Status update:  Experiences gender dysphoria;  ups and down with mood and self-perception, experiences paranoia and struggles in interpersonal relationships.      Changes since last session: is having distress related to being trans as the reality has set in; struggling with getting to know others    Progress Toward Treatment Goals:   Satisfactory      Therapeutic Interventions/Treatment Strategies:     Area(s) of treatment focus addressed in this session included Symptom Management, Interpersonal Relationship Skills, Gender Health and Wellness       Cognitive behavioral, interpersonal, and group therapy interventions were utilized to explore gender dysphoria (i.e., transition, interpersonal relationships, and medical interventions) and mental health symptoms, specifically depression and anxiety.  Dav took time to talk about the feelings and thoughts that came up for her as she enters more social spaces.  Is struggling with new work environment as she recognizes that she is contributing to some of the social interactions that are negative.  Recognizing that she had unrealistic expectations about her transition in what it would do for her sense of fulfillment.  Recognizes that she is using being trans as a scapegoat even though she is searching for community.  Was able to be  "authentic and expressive of her thoughts and feelings in the here and now of the group. Encouraged to continue to practice being \"real\" with us so she can see how that feels and make decisions the extent to which she wants to engage in this way with others in real life.     Treatment modalities used include [facility] THERAPY INTERVENTIONS: Motivational Interviewing Group Dynamic Therapy  Behavioral Activation: Explored how behaviors effect mood and interact with thoughts and feelings;  Promoting gender health and resiliency.      Patient Response:   Patient responded to session by listening and actively engaged.      Possible barriers to participation / learning include: some self-described paranoia      Current Mental Status Exam:   Appearance:  Appropriate   Eye Contact:  Good   Attitude / Demeanor: Cooperative and engaged  Speech      Rate / Production: Normal/ Responsive      Volume:  Normal  volume  Orientation:  All  Mood:   Frustrated and tired  Affect:   Congruent  Thought Content: Clear   Insight:   Good      Plan/Need for Future Services:  Return for group therapy in 2 weeks to treat diagnosed problems.    Patient has a current master individualized treatment plan.  See Epic treatment plan for more information.     Referral / Collaboration:  Referral to another professional/service is not indicated at this time.  Emergency Services Needed?  No     Assignment:  none     Interactive Complexity:  There are four specific communication difficulties that complicate the work of the primary psychiatric procedure.  Interactive complexity (+38932) may be reported when at least one of these difficulties is present.     Communication difficulties present during current the psychiatric procedure include:  None.       Candi Mera, PhD  Licensed Psychologist       "

## 2024-09-24 ENCOUNTER — VIRTUAL VISIT (OUTPATIENT)
Dept: PSYCHOLOGY | Facility: CLINIC | Age: 29
End: 2024-09-24
Payer: COMMERCIAL

## 2024-09-24 DIAGNOSIS — F33.3 MAJOR DEPRESSIVE DISORDER, RECURRENT, SEVERE WITH PSYCHOTIC FEATURES (H): ICD-10-CM

## 2024-09-24 DIAGNOSIS — F64.0 GENDER DYSPHORIA IN ADULT: Primary | ICD-10-CM

## 2024-09-24 DIAGNOSIS — F41.1 GENERALIZED ANXIETY DISORDER: ICD-10-CM

## 2024-09-24 PROCEDURE — 90837 PSYTX W PT 60 MINUTES: CPT | Mod: 95 | Performed by: MARRIAGE & FAMILY THERAPIST

## 2024-09-24 NOTE — PROGRESS NOTES
[facility] - Progress Note    Date of Service: 24   Name: Kd Slater  : 1995  Medical Record Number: [id number]  Treating Provider: Tenisha Stewart PsyD  Type of Session: Individual  Present in Session: Client  Session Start and Stop Time: 10-10:55  Number of Minutes:  55    SERVICE MODALITY:  Video Visit:      Provider verified identity through the following two step process.  Patient provided:  Patient was verified at admission/transfer    Telemedicine Visit: The patient's condition can be safely assessed and treated via synchronous audio and visual telemedicine encounter.      Reason for Telemedicine Visit: Patient convenience (e.g. access to timely appointments / distance to available provider)    Originating Site (Patient Location): Patient's home    Distant Site (Provider Location): [facility]    Consent:  The patient/guardian has verbally consented to: the potential risks and benefits of telemedicine (video visit) versus in person care; bill my insurance or make self-payment for services provided; and responsibility for payment of non-covered services.     Patient would like the video invitation sent by:  My Chart    Mode of Communication:  Video Conference via [facility]    Distant Location (Provider):  On-site    As the provider I attest to compliance with applicable laws and regulations related to telemedicine.    DSM-5 Diagnoses:  Encounter Diagnoses   Name Primary?    Gender dysphoria in adult Yes    Major depressive disorder, recurrent, severe with psychotic features (H)     Generalized anxiety disorder      Current Reported Symptoms and Status update:  Changes since last session- client reports continued anxiety, worry, paranoia related to gender dysphoria, race, socialization.     Progress Toward Treatment Goals:   Satisfactory progress     Therapeutic Interventions/Treatment Strategies:    Area(s) of treatment focus  addressed in this session included Symptom Management, Interpersonal Relationship Skills, Gender Health, and Cultural     Psychotherapist offered support, feedback and validation, provided redirection, and reinforced use of skills Treatment modalities used include Narrative Therapy Gender Affirming Care Interpersonal Transformative/Liberative Coping Skills: Assisted patient in understanding the purpose of planning / creating / participating / sharing in positive experiences and Addressed barriers to utilizing coping skills when in distress, Symptoms Management: Promoted understanding of their diagnoses and how it impacts their functioning, and Other: Assisted patient  in finding ways to adapt functioning in light of past traumatic experiences and Explored with patient how life transitions may impact mental health and functioning   Support, Redirection, and Feedback    Patient Response:   Patient responded to session by verbalizing understanding and actively engaged  Possible barriers to participation / learning include: N/A    Current Mental Status Exam:   Appearance:  Appropriate   Eye Contact:  Good   Attitude / Demeanor: Cooperative   Speech      Rate / Production: Normal/ Responsive      Volume:  Normal  volume  Orientation:  All  Mood:   Anxious   Affect:   Appropriate   Thought Content: Clear   Insight:   Good       Plan/Need for Future Services:  Return for therapy in 2 weeks to treat diagnosed problems.    Patient has a current master individualized treatment plan.  See Epic treatment plan for more information.    Referral / Collaboration:  Referral to another professional/service is not indicated at this time..  Emergency Services Needed?  No    Assignment:  None    Interactive Complexity:  There are four specific communication difficulties that complicate the work of the primary psychiatric procedure.  Interactive complexity (+62616) may be reported when at least one of these difficulties is  present.    Communication difficulties present during current the psychiatric procedure include:  None.      Signature/Title:    Tenisha Stewart PsyD

## 2024-09-24 NOTE — NURSING NOTE
Is the patient currently in the state of [place]? YES    Current patient location: [address] [place] [address] [address]  [facility] [id number]    Visit mode:VIDEO    If the visit is dropped, the patient can be reconnected by: VIDEO VISIT:  Send e-mail to at [email or web address]    Will anyone else be joining the visit? No  (If patient encounters technical issues they should call [phone])    How would you like to obtain your AVS? MyChart    Are changes needed to the allergy or medication list? N/A    Are refills needed on medications prescribed by this physician? NO    Rooming Documentation: Questionnaire(s) not done per department protocol.    Reason for visit: RECHECK     MIKE StaffordF

## 2024-10-01 ENCOUNTER — VIRTUAL VISIT (OUTPATIENT)
Dept: PSYCHOLOGY | Facility: CLINIC | Age: 29
End: 2024-10-01
Payer: COMMERCIAL

## 2024-10-01 DIAGNOSIS — F64.0 GENDER DYSPHORIA IN ADULT: ICD-10-CM

## 2024-10-01 DIAGNOSIS — F33.3 MAJOR DEPRESSIVE DISORDER, RECURRENT, SEVERE WITH PSYCHOTIC FEATURES (H): ICD-10-CM

## 2024-10-01 DIAGNOSIS — F41.1 GENERALIZED ANXIETY DISORDER: Primary | ICD-10-CM

## 2024-10-01 PROCEDURE — 90837 PSYTX W PT 60 MINUTES: CPT | Mod: 95 | Performed by: MARRIAGE & FAMILY THERAPIST

## 2024-10-01 NOTE — NURSING NOTE
Is the patient currently in the state of [place]? YES    Current patient location: [address] [place] [id number]    Visit mode:VIDEO    If the visit is dropped, the patient can be reconnected by: VIDEO VISIT: Text to cell phone:   Telephone Information:   Mobile [phone]       Will anyone else be joining the visit? No  (If patient encounters technical issues they should call [phone])    How would you like to obtain your AVS? MyChart    Are changes needed to the allergy or medication list? N/A    Are refills needed on medications prescribed by this physician? No    Rooming Documentation: Questionnaire(s) not done per department protocol.    Reason for visit: RECHECK     ANGELINA Pace

## 2024-10-01 NOTE — PROGRESS NOTES
[facility] - Progress Note    Date of Service: 10/01/24   Name: Kd Slater  : 1995  Medical Record Number: [id number]  Treating Provider: Tenisha Stewart PsyD   Type of Session: Individual  Present in Session: Client  Session Start and Stop Time: 10:05-11  Number of Minutes:  55    SERVICE MODALITY:  Video Visit:      Provider verified identity through the following two step process.  Patient provided:  Patient was verified at admission/transfer    Telemedicine Visit: The patient's condition can be safely assessed and treated via synchronous audio and visual telemedicine encounter.      Reason for Telemedicine Visit: Patient convenience (e.g. access to timely appointments / distance to available provider)    Originating Site (Patient Location): Patient's home    Distant Site (Provider Location): [facility]    Consent:  The patient/guardian has verbally consented to: the potential risks and benefits of telemedicine (video visit) versus in person care; bill my insurance or make self-payment for services provided; and responsibility for payment of non-covered services.     Patient would like the video invitation sent by:  My Chart    Mode of Communication:  Video Conference via [facility]    Distant Location (Provider):  On-site    As the provider I attest to compliance with applicable laws and regulations related to telemedicine.    DSM-5 Diagnoses:  Encounter Diagnoses   Name Primary?    Gender dysphoria in adult     Major depressive disorder, recurrent, severe with psychotic features (H)     Generalized anxiety disorder Yes     Current Reported Symptoms and Status update:  Changes since last session- client reports continued anxiety, periods of worry, rumination, paranoia, low mood, gender dysphoria- social and anatomical.     Progress Toward Treatment Goals:   Minimal progress     Therapeutic Interventions/Treatment Strategies:    Area(s)  of treatment focus addressed in this session included Symptom Management, Interpersonal Relationship Skills, Gender Health, Cultural , and Physical Health     Psychotherapist offered support, feedback and validation, provided redirection, and reinforced use of skills Treatment modalities used include Narrative Therapy Interpersonal Humanist Cognitive Restructuring:  Explored impact of ineffective thoughts / distortions on mood and activity and Facilitated recognition of the connection between negative thoughts and negative core beliefs, Coping Skills: Discussed use of self-soothe skills to decrease distress in the body, Promoted understanding of how and when to apply grounding strategies to reduce distress and increase presence in the moment, and Assisted patient in understanding the purpose of planning / creating / participating / sharing in positive experiences, and Other: Assisted patient  in finding ways to adapt functioning in light of past traumatic experiences and Explored with patient how life transitions may impact mental health and functioning   Support, Redirection, and Feedback    Patient Response:   Patient responded to session by verbalizing understanding and actively engaged  Possible barriers to participation / learning include: N/A    Current Mental Status Exam:   Appearance:  Appropriate   Eye Contact:  Good   Attitude / Demeanor: Cooperative   Speech      Rate / Production: Normal/ Responsive      Volume:  Normal  volume  Orientation:  All  Mood:   Normal  Affect:   Appropriate   Thought Content: Clear   Insight:   Good       Plan/Need for Future Services:  Return for therapy in 2 weeks to treat diagnosed problems.    Patient has a current master individualized treatment plan.  See Epic treatment plan for more information.    Referral / Collaboration:  Referral to another professional/service is not indicated at this time..  Emergency Services Needed?  No    Assignment:  none    Interactive  Complexity:  There are four specific communication difficulties that complicate the work of the primary psychiatric procedure.  Interactive complexity (+30929) may be reported when at least one of these difficulties is present.    Communication difficulties present during current the psychiatric procedure include:  None.      Signature/Title:    Tenisha Stewart PsyD

## 2024-10-14 ENCOUNTER — OFFICE VISIT (OUTPATIENT)
Dept: PSYCHOLOGY | Facility: CLINIC | Age: 29
End: 2024-10-14
Payer: COMMERCIAL

## 2024-10-14 ENCOUNTER — VIRTUAL VISIT (OUTPATIENT)
Dept: OTOLARYNGOLOGY | Facility: CLINIC | Age: 29
End: 2024-10-14
Payer: COMMERCIAL

## 2024-10-14 DIAGNOSIS — R49.9 VOICE AND RESONANCE DISORDER: ICD-10-CM

## 2024-10-14 DIAGNOSIS — F64.0 GENDER DYSPHORIA IN ADULT: Primary | ICD-10-CM

## 2024-10-14 DIAGNOSIS — F64.0 GENDER DYSPHORIA IN ADULT: ICD-10-CM

## 2024-10-14 DIAGNOSIS — F33.3 MAJOR DEPRESSIVE DISORDER, RECURRENT, SEVERE WITH PSYCHOTIC FEATURES (H): ICD-10-CM

## 2024-10-14 DIAGNOSIS — R49.0 DYSPHONIA: Primary | ICD-10-CM

## 2024-10-14 PROCEDURE — 90853 GROUP PSYCHOTHERAPY: CPT | Performed by: PSYCHOLOGIST

## 2024-10-14 PROCEDURE — 92507 TX SP LANG VOICE COMM INDIV: CPT | Mod: GN | Performed by: SPEECH-LANGUAGE PATHOLOGIST

## 2024-10-14 NOTE — PROGRESS NOTES
"Bryon is a 29 year old adult who is being cared for via a billable virtual visit.        The patient/client has been notified and verbally consented to the following statements:   This video visit will be conducted between you and your provider.  If during the course of the call the provider feels a video visit is not appropriate, you will not be charged for this service.    Provider has received verbal consent for billable virtual visit from the patient? Yes    Preferred method for receiving information: [email or web address]     Call initiated at: 1:03 p  Platform used to conduct today's virtual appointment: [employer] [facility]  Location of provider: Work Residence  Location of patient: Residence. 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    Remarkable findings included:  Perceptual evaluation demonstrated:   Roughness: WNL  Breathiness: WNL  Strain: WNL  Pitch:  F0/Habitual/Conversational speech: informally judged as WNL  Laryngeal exam demonstrated: Not warranted  Primary complaint of patient today included: Seeking of voice and that is comfortable, authentic, and safe.     Additional assessment based discussion included:  I encouragedDav to listen to the body and take breaks, as needed.      Therefore, we recommended a course of speech therapy to address these concerns.\"      SUBJECTIVE:  Since the last appointment, Ms. Slater reports the following:   Overall she reports that symptoms have continued " to be well maintained  The after visit summary is helpful to facilitate practice.  voice is improved during the exercises, but minimal carryover to spontaneous conversation  she does not entirely understand how to do the exercises, and would like clarification  she experiences improvement in fatigue and discomfort while doing the exercises      OBJECTIVE:  Ms. Slater presents today with the following:  Voice quality:  Speaking voice:  Roughness: WNL  Breathiness: WNL  Strain: WNL  Pitch:  F0/Habitual/Conversational speech: informally judged as WNL      PATIENT REPORTED MEASURES:  Patient Supplied Answers To SLP QOL Questionnaire       No data to display              TSEQ:  A)  range from gender neutral to somewhat fem  B) same    1) 1 (said on prequestionnaire 2)  2)  0  3) 2  4) 0  5) 2  6) 2  7)0  8) 2  9) 1  10)0  11) 0  12)0  13) 1  14)0  15) 0  16) 0  17) 0  18) 1  19)0  20) 0  21) 0  22) 0  23) 0  24) 0  25) 1  26) 1  27) 1 - will crack  28) 0 - only those being intentional  29) 0  30) 0  16/120    Doing the exercises at the beginning was helpful - something to focus on and then has a shakespere sonnet and do that a nd then worked towards cnosistency.    - working with humming to a note was also helpful and changing resonance itself.    - all of the exercises were helpful.   - reading billboards.       THERAPEUTIC ACTIVITIES  Demonstrated previous exercises.  demonstrated improved technique  appropriate redirection provided  instruction provided for increased level of complexity/difficulty    Counseling and Education:  Asked many questions about the nature of her symptoms, and I answered all of these thoroughly.  A revised regimen for home practice was instructed.    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 Ms. Slater  continues to demonstrate good progress of therapeutic activities provided to optimize breath flow.  Ms. Slater is very confident in her current voice quality, but would like to have a reassessment and check in in March to ensure she is staying on track.     PLAN: I will see Ms. Slater in March 2025  For practice goals see AVS.     Next Clinic Appt: March 2025    TOTAL SERVICE TIME:   Call Initiated at: 1:03 pm  Call Ended at: 2pm           CPT Billing Codes:   TREATMENT OF SPEECH, LANGUAGE, VOICE, COMMUNICATION, and/or AUDITORY PROCESSING DISORDER (45888)    SPENCER Martinez, M.M., CCC-SLP  Speech-Language Pathologist  [facility] Trained Vocologist  [facility]  [email or web address]  Pronouns: she/her      *this report was created in part through the use of computerized dictation software, and though reviewed following completion, some typographic errors may persist.  If there is confusion regarding any of this notes contents, please contact me for clarification

## 2024-10-14 NOTE — LETTER
"10/14/2024       RE: Kd Slater  [address] [facility] [address]     Dear Colleague,    Thank you for referring your patient, Kd Slater, to the [facility] [place] [facility]. Please see a copy of my visit note below.    Bryon is a 29 year old adult who is being cared for via a billable virtual visit.        The patient/client has been notified and verbally consented to the following statements:   This video visit will be conducted between you and your provider.  If during the course of the call the provider feels a video visit is not appropriate, you will not be charged for this service.    Provider has received verbal consent for billable virtual visit from the patient? Yes    Preferred method for receiving information: [employer]     Call initiated at: 1:03 p  Platform used to conduct today's virtual appointment: AM Allen Video  Location of provider: Work Residence  Location of patient: Residence. 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    Remarkable findings included:  Perceptual evaluation demonstrated:   Roughness: WNL  Breathiness: WNL  Strain: WNL  Pitch:  F0/Habitual/Conversational speech: informally judged as WNL  Laryngeal exam demonstrated: Not warranted  Primary complaint of patient today included: Seeking of voice and that is comfortable, authentic, and safe.     Additional assessment " "based discussion included:  I encouraged, Dav to listen to the body and take breaks, as needed.      Therefore, we recommended a course of speech therapy to address these concerns.\"      SUBJECTIVE:  Since the last appointment, Ms. Slater reports the following:   Overall she reports that symptoms have continued to be well maintained  The after visit summary is helpful to facilitate practice.  voice is improved during the exercises, but minimal carryover to spontaneous conversation  she does not entirely understand how to do the exercises, and would like clarification  she experiences improvement in fatigue and discomfort while doing the exercises      OBJECTIVE:  Ms. Slater presents today with the following:  Voice quality:  Speaking voice:  Roughness: WNL  Breathiness: WNL  Strain: WNL  Pitch:  F0/Habitual/Conversational speech: informally judged as WNL      PATIENT REPORTED MEASURES:  Patient Supplied Answers To SLP QOL Questionnaire       No data to display              TSEQ:  A)  range from gender neutral to somewhat fem  B) same    1) 1 (said on prequestionnaire 2)  2)  0  3) 2  4) 0  5) 2  6) 2  7)0  8) 2  9) 1  10)0  11) 0  12)0  13) 1  14)0  15) 0  16) 0  17) 0  18) 1  19)0  20) 0  21) 0  22) 0  23) 0  24) 0  25) 1  26) 1  27) 1 - will crack  28) 0 - only those being intentional  29) 0  30) 0  16/120    Doing the exercises at the beginning was helpful - something to focus on and then has a shakespere sonnet and do that a nd then worked towards cnosistency.    - working with humming to a note was also helpful and changing resonance itself.    - all of the exercises were helpful.   - reading billboards.       THERAPEUTIC ACTIVITIES  Demonstrated previous exercises.  demonstrated improved technique  appropriate redirection provided  instruction provided for increased level of complexity/difficulty    Counseling and Education:  Asked many questions about the nature of her symptoms, and I answered all " of these thoroughly.  A revised regimen for home practice was instructed.    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 Ms. Slater  continues to demonstrate good progress of therapeutic activities provided to optimize breath flow.  Ms. Slater is very confident in her current voice quality, but would like to have a reassessment and check in in March to ensure she is staying on track.     PLAN: I will see Ms. Slater in March 2025  For practice goals see AVS.     Next Clinic Appt: March 2025    TOTAL SERVICE TIME:   Call Initiated at: 1:03 pm  Call Ended at: 2pm           CPT Billing Codes:   TREATMENT OF SPEECH, LANGUAGE, VOICE, COMMUNICATION, and/or AUDITORY PROCESSING DISORDER (71758)    [email or web address] [email or web address], M.M., CCC-SLP  Speech-Language Pathologist  [facility] Trained Vocologist  [facility]  [email or web address]  Pronouns: she/her      *this report was created in part through the use of computerized dictation software, and though reviewed following completion, some typographic errors may persist.  If there is confusion regarding any of this notes contents, please contact me for clarification            Again, thank you for allowing me to participate in the care of your patient.      Sincerely,    LIZBETH Hussein

## 2024-10-17 NOTE — PROGRESS NOTES
[facility] - Progress Note    Date of Service: 10/14/24      Name: Kd Reyes)  : 1995  Medical Record Number: [id number]  Treating Provider: Candi Mera, PhD   Type of Session: group  Present in Session: Client with other group members  Type of Session: Group  Number of Minutes: 120 mins   Therapist(s): Candi Mera, PhD, LP     Video start time: 6:30 PM  Video end time: 8:30 PM     SERVICE MODALITY:  In Person      DSM-5 Diagnoses:  F64.0 - Gender Dysphoria in Adolescent and Adult   296.33 Recurrent Major depression      Current Reported Symptoms and Status update:  Experiences gender dysphoria;  ups and down with mood and self-perception, experiences paranoia and struggles in interpersonal relationships.      Changes since last session: chose not to go through with moving in with someone that didn't feel right but that means sleeping in mom's living room; got validation about the impact of too low levels of T    Progress Toward Treatment Goals:   Satisfactory      Therapeutic Interventions/Treatment Strategies:     Area(s) of treatment focus addressed in this session included Symptom Management, Interpersonal Relationship Skills, Gender Health and Wellness       Cognitive behavioral, interpersonal, and group therapy interventions were utilized to explore gender dysphoria (i.e., transition, interpersonal relationships, and medical interventions) and mental health symptoms, specifically depression and anxiety.  Client participated in a group process discussion about boundaries within the group and outside of the group.  Provided understanding of why watching out for the integrity of the group process is important to the health of the group.  Shared with the group the validation she felt when confirmed T-levels have been too low. Recognition that she showed good judgement by not moving in with someone when didn't feel comfortable and this also helped her see the need  to do some long-term planning rather than living crisis to crisis.     Treatment modalities used include [facility] THERAPY INTERVENTIONS: Motivational Interviewing Group Dynamic Therapy  Behavioral Activation: Explored how behaviors effect mood and interact with thoughts and feelings;  Promoting gender health and resiliency.      Patient Response:   Patient responded to session by listening and actively engaged.      Possible barriers to participation / learning include: some self-described paranoia      Current Mental Status Exam:   Appearance:  Appropriate   Eye Contact:  Good   Attitude / Demeanor: Cooperative and engaged  Speech      Rate / Production: Normal/ Responsive      Volume:  Normal  volume  Orientation:  All  Mood:   Euthymic  Affect:   Congruent  Thought Content: Clear   Insight:   Good      Plan/Need for Future Services:  Return for group therapy in 2 weeks to treat diagnosed problems.    Patient has a current master individualized treatment plan.  See Epic treatment plan for more information.     Referral / Collaboration:  Referral to another professional/service is not indicated at this time.  Emergency Services Needed?  No     Assignment:  none     Interactive Complexity:  There are four specific communication difficulties that complicate the work of the primary psychiatric procedure.  Interactive complexity (+57225) may be reported when at least one of these difficulties is present.     Communication difficulties present during current the psychiatric procedure include:  None.       Candi Mera, PhD  Licensed Psychologist

## 2024-10-28 ENCOUNTER — VIRTUAL VISIT (OUTPATIENT)
Dept: PSYCHOLOGY | Facility: CLINIC | Age: 29
End: 2024-10-28
Payer: COMMERCIAL

## 2024-10-28 ENCOUNTER — OFFICE VISIT (OUTPATIENT)
Dept: PSYCHOLOGY | Facility: CLINIC | Age: 29
End: 2024-10-28
Payer: COMMERCIAL

## 2024-10-28 DIAGNOSIS — F64.0 GENDER DYSPHORIA IN ADULT: Primary | ICD-10-CM

## 2024-10-28 DIAGNOSIS — F33.3 MAJOR DEPRESSIVE DISORDER, RECURRENT, SEVERE WITH PSYCHOTIC FEATURES (H): ICD-10-CM

## 2024-10-28 PROCEDURE — 90853 GROUP PSYCHOTHERAPY: CPT | Performed by: PSYCHOLOGIST

## 2024-10-28 PROCEDURE — 90837 PSYTX W PT 60 MINUTES: CPT | Mod: 95 | Performed by: MARRIAGE & FAMILY THERAPIST

## 2024-10-28 NOTE — PROGRESS NOTES
[facility] - Progress Note    Date of Service: 10/28/24   Name: Kd Slater  : 1995  Medical Record Number: [id number]  Treating Provider: Tenisha Stewart PsyD  Type of Session: Individual  Present in Session: Client   Session Start and Stop Time: 9:02-10  Number of Minutes: 58    SERVICE MODALITY:  Video Visit:      Provider verified identity through the following two step process.  Patient provided:  Patient was verified at admission/transfer    Telemedicine Visit: The patient's condition can be safely assessed and treated via synchronous audio and visual telemedicine encounter.      Reason for Telemedicine Visit: Patient convenience (e.g. access to timely appointments / distance to available provider)    Originating Site (Patient Location): Patient's home    Distant Site (Provider Location): [facility]    Consent:  The patient/guardian has verbally consented to: the potential risks and benefits of telemedicine (video visit) versus in person care; bill my insurance or make self-payment for services provided; and responsibility for payment of non-covered services.     Patient would like the video invitation sent by:  My Chart    Mode of Communication:  Video Conference via [facility]    Distant Location (Provider):  On-site    As the provider I attest to compliance with applicable laws and regulations related to telemedicine.    DSM-5 Diagnoses:  Encounter Diagnoses   Name Primary?    Gender dysphoria in adult Yes    Major depressive disorder, recurrent, severe with psychotic features (H)      Current Reported Symptoms and Status update:  Changes since last session- client reports continued dysphoria, challenges navigating people pleasing, paranoia, internalized fear, shame, misogyny, transphobia.     Progress Toward Treatment Goals:   Satisfactory progress     Therapeutic Interventions/Treatment Strategies:    Area(s) of treatment focus  addressed in this session included Interpersonal Relationship Skills, Gender Health, Develop Socialization , Cultural , and Physical Health     Psychotherapist offered support, feedback and validation, provided redirection, and reinforced use of skills Treatment modalities used include Narrative Therapy Interpersonal Cognitive Restructuring:  Facilitated recognition of the connection between negative thoughts and negative core beliefs, Symptoms Management: Promoted understanding of their diagnoses and how it impacts their functioning, and Other: Assisted patient  in finding ways to adapt functioning in light of past traumatic experiences and Explored with patient how life transitions may impact mental health and functioning   Support, Redirection, and Feedback    Patient Response:   Patient responded to session by verbalizing understanding and actively engaged  Possible barriers to participation / learning include: N/A    Current Mental Status Exam:   Appearance:  Appropriate   Eye Contact:  Good   Attitude / Demeanor: Cooperative   Speech      Rate / Production: Normal/ Responsive      Volume:  Normal  volume  Orientation:  All  Mood:   Normal  Affect:   Appropriate   Thought Content: Clear   Insight:   Good       Plan/Need for Future Services:  Return for therapy in 2 weeks to treat diagnosed problems.    Patient has a current master individualized treatment plan.  See Epic treatment plan for more information.    Referral / Collaboration:  Referral to another professional/service is not indicated at this time..  Emergency Services Needed?  No    Assignment:  None    Interactive Complexity:  There are four specific communication difficulties that complicate the work of the primary psychiatric procedure.  Interactive complexity (+87827) may be reported when at least one of these difficulties is present.    Communication difficulties present during current the psychiatric procedure  include:  None.      Signature/Title:    Tenisha Stewart PsyD

## 2024-10-28 NOTE — PROGRESS NOTES
"[facility] Progress [facility]    Date of Service: 10/28/24      Name: Kd Reyes)  : 1995  Medical Record Number: [id number]  Treating Provider: Candi Mera, PhD   Type of Session: group  Present in Session: Client with other group members  Type of Session: Group  Number of Minutes: 120 mins   Therapist(s): Candi Mera, PhD, LP     Video start time: 6:30 PM  Video end time: 8:30 PM     SERVICE MODALITY:  In Person      DSM-5 Diagnoses:  F64.0 - Gender Dysphoria in Adolescent and Adult   296.33 Recurrent Major depression      Current Reported Symptoms and Status update:  Experiences gender dysphoria;  ups and down with mood and self-perception, experiences paranoia and struggles in interpersonal relationships.      Changes since last session: recognition that \"I am way too keyed into what other people are doing.\"  Is being more free with gender expression.   Used the women's bathroom at the comedy club.      Progress Toward Treatment Goals:   Satisfactory      Therapeutic Interventions/Treatment Strategies:     Area(s) of treatment focus addressed in this session included Symptom Management, Interpersonal Relationship Skills, Gender Health and Wellness       Cognitive behavioral, interpersonal, and group therapy interventions were utilized to explore gender dysphoria (i.e., transition, interpersonal relationships, and medical interventions) and mental health symptoms, specifically depression and anxiety.  Requested time to share about a negative feedback loop that she gets herself in due to fears around vulnerability that then leads her to shut down that causes others to not approach her and feeds the low self-esteem.   Also, in response to another group member's share, gave an example of being told \"you are a man\" and she responds with \"I'll show you a man.\"  Recognizes that this backfires for her and is working hard to regulate these reactions to be more healthy for " herself.     Treatment modalities used include [facility] THERAPY INTERVENTIONS: Motivational Interviewing Group Dynamic Therapy  Behavioral Activation: Explored how behaviors effect mood and interact with thoughts and feelings;  Promoting gender health and resiliency.      Patient Response:   Patient responded to session by listening and actively engaged.      Possible barriers to participation / learning include: some self-described paranoia      Current Mental Status Exam:   Appearance:  Appropriate   Eye Contact:  Good   Attitude / Demeanor: Cooperative and engaged  Speech      Rate / Production: Normal/ Responsive      Volume:  Normal  volume  Orientation:  All  Mood:   Euthymic and reflective  Affect:   Congruent  Thought Content: Clear   Insight:   Good      Plan/Need for Future Services:  Return for group therapy in 2 weeks to treat diagnosed problems.    Patient has a current master individualized treatment plan.  See Epic treatment plan for more information.     Referral / Collaboration:  Referral to another professional/service is not indicated at this time.  Emergency Services Needed?  No     Assignment:  none     Interactive Complexity:  There are four specific communication difficulties that complicate the work of the primary psychiatric procedure.  Interactive complexity (+89055) may be reported when at least one of these difficulties is present.     Communication difficulties present during current the psychiatric procedure include:  None.       Candi Mera, PhD  Licensed Psychologist

## 2024-10-28 NOTE — NURSING NOTE
Current patient location: [address] [place] [address]    Is the patient currently in the state of [place]? YES    Visit mode:VIDEO    If the visit is dropped, the patient can be reconnected by: VIDEO VISIT: Text to cell phone:   Telephone Information:   Mobile [phone]    and VIDEO VISIT: Send to e-mail at: [email or web address]    Will anyone else be joining the visit? NO  (If patient encounters technical issues they should call [phone])    Are changes needed to the allergy or medication list? No    Are refills needed on medications prescribed by this physician? NO    Rooming Documentation:  Questionnaire(s) not done per department protocol    Reason for visit: RECHECK    Demi ALFARO

## 2024-11-11 ENCOUNTER — OFFICE VISIT (OUTPATIENT)
Dept: PSYCHOLOGY | Facility: CLINIC | Age: 29
End: 2024-11-11
Payer: COMMERCIAL

## 2024-11-11 ENCOUNTER — VIRTUAL VISIT (OUTPATIENT)
Dept: PSYCHOLOGY | Facility: CLINIC | Age: 29
End: 2024-11-11
Payer: COMMERCIAL

## 2024-11-11 DIAGNOSIS — F33.3 MAJOR DEPRESSIVE DISORDER, RECURRENT, SEVERE WITH PSYCHOTIC FEATURES (H): ICD-10-CM

## 2024-11-11 DIAGNOSIS — F64.0 GENDER DYSPHORIA IN ADULT: Primary | ICD-10-CM

## 2024-11-11 DIAGNOSIS — F41.1 GENERALIZED ANXIETY DISORDER: ICD-10-CM

## 2024-11-11 PROCEDURE — 90837 PSYTX W PT 60 MINUTES: CPT | Mod: 95 | Performed by: MARRIAGE & FAMILY THERAPIST

## 2024-11-11 NOTE — PROGRESS NOTES
[facility] - Progress Note    Date of Service: 24   Name: Kd Slater  : 1995  Medical Record Number: [id number]  Treating Provider: Tenisha Stewart PsyD    Type of Session: Individual  Present in Session: Client  Session Start and Stop Time: :55  Number of Minutes: 55    SERVICE MODALITY:  Video Visit:      Provider verified identity through the following two step process.  Patient provided:  Patient was verified at admission/transfer    Telemedicine Visit: The patient's condition can be safely assessed and treated via synchronous audio and visual telemedicine encounter.      Reason for Telemedicine Visit: Patient convenience (e.g. access to timely appointments / distance to available provider)    Originating Site (Patient Location): Patient's home    Distant Site (Provider Location): [facility]    Consent:  The patient/guardian has verbally consented to: the potential risks and benefits of telemedicine (video visit) versus in person care; bill my insurance or make self-payment for services provided; and responsibility for payment of non-covered services.     Patient would like the video invitation sent by:  My Chart    Mode of Communication:  Video Conference via [facility]    Distant Location (Provider):  On-site    As the provider I attest to compliance with applicable laws and regulations related to telemedicine.    DSM-5 Diagnoses:  Encounter Diagnoses   Name Primary?    Gender dysphoria in adult Yes    Major depressive disorder, recurrent, severe with psychotic features (H)     Generalized anxiety disorder      Current Reported Symptoms and Status update:  Changes since last session***    Progress Toward Treatment Goals:   {Progress Described:627316}    Therapeutic Interventions/Treatment Strategies:    Area(s) of treatment focus addressed in this session included { AREA OF TREATMENT FOCUS:588883}    ***    {[facility] progress note  modalities:072765}  {OP BEH ADULT MH THERAPEUTIC INTERVENTIONS & TX STRAT:296619}    Patient Response:   Patient responded to session by {PATIENT RESPONSE:606349}  Possible barriers to participation / learning include: {POSSIBLE BARRIERS:764219}    Current Mental Status Exam:   Appearance:  {Appearance:135019}  Eye Contact:  {Eye Contact:953720}  Attitude / Demeanor: {Attitude:864867}  Speech      Rate / Production: {Rate/Production:138799}      Volume:  {Volume:962771} volume  Orientation:  {Orientation:467745}  Mood:   {Mood:590275}  Affect:   {Affect:847787}  Thought Content: {Thought Content:630246}  Insight:   {Insight:786766}      Plan/Need for Future Services:  Return for therapy in *** weeks to treat diagnosed problems.    Patient has {OP MH PROGRAMMATIC TX PLAN STATUS:817918}    Referral / Collaboration:  {Referral to Professional:400523}.  Emergency Services Needed?  {69943 (16-60 min) / 21812 (30 min add-on; stackable if needed):471539}    Assignment:  ***    Interactive Complexity:  There are four specific communication difficulties that complicate the work of the primary psychiatric procedure.  Interactive complexity (+80852) may be reported when at least one of these difficulties is present.    Communication difficulties present during current the psychiatric procedure include:  {[facility] INTERACTIVE COMPLEXITY:45527907}      Signature/Title:    Tenisha Stewart PsyD      psychiatric procedure include:  None.      Signature/Title:    Tenisha Stewart PsyD

## 2024-11-11 NOTE — NURSING NOTE
Current patient location: [address] [place] [address]    Is the patient currently in the state of [place]? YES    Visit mode:VIDEO    If the visit is dropped, the patient can be reconnected by: VIDEO VISIT: Text to cell phone:   Telephone Information:   Mobile [phone]       Will anyone else be joining the visit? NO  (If patient encounters technical issues they should call [phone])    Are changes needed to the allergy or medication list? N/A    Are refills needed on medications prescribed by this physician? NO    Rooming Documentation:  Not applicable    Reason for visit: DIEGO ALFARO

## 2024-11-12 NOTE — PROGRESS NOTES
"[facility] Progress Note    Date of Service: 24      Name: Kd Reyes)  : 1995  Medical Record Number: [id number]  Treating Provider: Candi Mera, PhD   Type of Session: group  Present in Session: Client with other group members  Type of Session: Group  Number of Minutes: 120 mins   Therapist(s): Candi Mera, PhD, LP     Video start time: 6:30 PM  Video end time: 8:30 PM     SERVICE MODALITY:  In Person      DSM-5 Diagnoses:  F64.0 - Gender Dysphoria in Adolescent and Adult   296.33 Recurrent Major depression      Current Reported Symptoms and Status update:  Experiences gender dysphoria;  ups and down with mood and self-perception, experiences paranoia and struggles in interpersonal relationships.      Changes since last session: leaning into femininity and doing things to \"not hide\"; making connections between early trauma and difficulty expressing self across the board; struggling at work--reports hostile environment.    Progress Toward Treatment Goals:   Satisfactory      Therapeutic Interventions/Treatment Strategies:     Area(s) of treatment focus addressed in this session included Symptom Management, Interpersonal Relationship Skills, Gender Health and Wellness       Cognitive behavioral, interpersonal, and group therapy interventions were utilized to explore gender dysphoria (i.e., transition, interpersonal relationships, and medical interventions) and mental health symptoms, specifically depression and anxiety.  Client participated in a group discussion about how reacted to recent election and what coping mechanisms are in place already as well as exploring an increased role in community as a buffer to current political climate.     Treatment modalities used include [facility] THERAPY INTERVENTIONS: Motivational Interviewing Group Dynamic Therapy  Behavioral Activation: Explored how behaviors effect mood and interact with thoughts and feelings;  Promoting " Subjective   Zuri Hatch is a 54 y.o. female.      History of Present Illness   The patient is here today to F/U on lab work. She is feeling wlel.     HPL-Pt is doing well with current medication regimen, denies adverse reactions, compliant with medication schedule.     DM2- she was having nausea and vomiting, ozempic was stopped a few months ago.     In OT for her finger. Was in the hospital for 1 week. She has had cataract sx since being here last.   The following portions of the patient's history were reviewed and updated as appropriate: allergies, current medications, past family history, past medical history, past social history, past surgical history and problem list.    Review of Systems   Constitutional: Negative for chills and fever.   HENT: Positive for congestion, postnasal drip and rhinorrhea.    Respiratory: Negative.    Cardiovascular: Negative.    Allergic/Immunologic: Positive for environmental allergies.   Psychiatric/Behavioral: Negative for dysphoric mood and suicidal ideas. The patient is not nervous/anxious.        Objective   Physical Exam  Constitutional:       Appearance: Normal appearance. She is well-developed.   Neck:      Thyroid: No thyromegaly.   Cardiovascular:      Rate and Rhythm: Normal rate and regular rhythm.      Pulses:           Dorsalis pedis pulses are 2+ on the right side and 2+ on the left side.        Posterior tibial pulses are 2+ on the right side and 2+ on the left side.      Heart sounds: Normal heart sounds.   Pulmonary:      Effort: Pulmonary effort is normal.      Breath sounds: Normal breath sounds.   Musculoskeletal:      Cervical back: Normal range of motion and neck supple.      Right foot: Normal range of motion.      Left foot: Normal range of motion.   Feet:      Right foot:      Protective Sensation: 10 sites tested. 10 sites sensed.      Skin integrity: Dry skin present.      Toenail Condition: Right toenails are normal.      Left foot:      Protective  Sensation: 10 sites tested. 10 sites sensed.      Skin integrity: Dry skin present.   Lymphadenopathy:      Cervical: No cervical adenopathy.   Skin:     General: Skin is warm and dry.   Neurological:      Mental Status: She is alert.   Psychiatric:         Behavior: Behavior normal.         Thought Content: Thought content normal.         Judgment: Judgment normal.         Vitals:    11/23/21 0752   BP: 122/70   Pulse: 79   Temp: 97.3 °F (36.3 °C)   SpO2: 96%     Body mass index is 32.71 kg/m².      Assessment/Plan   Diagnoses and all orders for this visit:    1. Allergic rhinitis, unspecified seasonality, unspecified trigger (Primary)    2. Type 2 diabetes mellitus without complication, without long-term current use of insulin (HCC)  -     SITagliptin (Januvia) 100 MG tablet; Take 1 tablet by mouth Daily.  Dispense: 30 tablet; Refill: 6    3. Hyperlipidemia, unspecified hyperlipidemia type    4. Essential hypertension               1. AR- start NSS and xyzal   2. DM2- add on januvia, discussed constipation with trulicity, vomiting with ozempic, could consider victoza if needed, she is back on WW and will start exercising  3. HPL- well controlled  4. HTN- well controlled    C-scope reminded  GYN- reminded  “Discussed risks/benefits to vaccination, reviewed components of the vaccine, discussed VIS, discussed informed consent, informed consent obtained. Patient/Parent was allowed to accept or refuse vaccine. Questions answered to satisfactory state of patient/Parent. We reviewed typical age appropriate and seasonally appropriate vaccinations. Reviewed immunization history and updated state vaccination form as needed. Patient was counseled on COVID19       gender health and resiliency.      Patient Response:   Patient responded to session by listening and actively engaged.      Possible barriers to participation / learning include: some self-described paranoia      Current Mental Status Exam:   Appearance:  Appropriate   Eye Contact:  Good   Attitude / Demeanor: Cooperative and engaged  Speech      Rate / Production: Normal/ Responsive      Volume:  Normal  volume  Orientation:  All  Mood:   Euthymic and reflective; some anger  Affect:   Congruent  Thought Content: Clear   Insight:   Good      Plan/Need for Future Services:  Return for group therapy in 2 weeks to treat diagnosed problems.    Patient has a current master individualized treatment plan.  See Epic treatment plan for more information.     Referral / Collaboration:  Referral to another professional/service is not indicated at this time.  Emergency Services Needed?  No     Assignment:  none     Interactive Complexity:  There are four specific communication difficulties that complicate the work of the primary psychiatric procedure.  Interactive complexity (+78343) may be reported when at least one of these difficulties is present.     Communication difficulties present during current the psychiatric procedure include:  None.       Candi Mera, PhD  Licensed Psychologist

## 2024-11-25 ENCOUNTER — VIRTUAL VISIT (OUTPATIENT)
Dept: PSYCHOLOGY | Facility: CLINIC | Age: 29
End: 2024-11-25
Payer: COMMERCIAL

## 2024-11-25 ENCOUNTER — OFFICE VISIT (OUTPATIENT)
Dept: PSYCHOLOGY | Facility: CLINIC | Age: 29
End: 2024-11-25
Payer: COMMERCIAL

## 2024-11-25 DIAGNOSIS — F64.0 GENDER DYSPHORIA IN ADULT: Primary | ICD-10-CM

## 2024-11-25 DIAGNOSIS — F33.3 MAJOR DEPRESSIVE DISORDER, RECURRENT, SEVERE WITH PSYCHOTIC FEATURES (H): ICD-10-CM

## 2024-11-25 DIAGNOSIS — F41.1 GENERALIZED ANXIETY DISORDER: ICD-10-CM

## 2024-11-25 NOTE — NURSING NOTE
Is the patient currently in the state of [place]? YES    Current patient location: [address] [place] [id number]    Visit mode:VIDEO    If the visit is dropped, the patient can be reconnected by: VIDEO VISIT:  Send e-mail to at [email or web address]    Will anyone else be joining the visit? No  (If patient encounters technical issues they should call [phone])    Are changes needed to the allergy or medication list? N/A    Are refills needed on medications prescribed by this physician? No    Rooming Documentation: Questionnaire(s) not done per department protocol.    Reason for visit: RECHANGELINA James

## 2024-11-25 NOTE — PROGRESS NOTES
[facility] - Progress Note    Date of Service: 24   Name: Kd Slater  : 1995  Medical Record Number: [id number]  Treating Provider: Tenisha Stewart PsyD   Type of Session: Individual  Present in Session: Client  Session Start and Stop Time: :55  Number of Minutes:  55    SERVICE MODALITY:  Video Visit:      Provider verified identity through the following two step process.  Patient provided:  Patient was verified at admission/transfer    Telemedicine Visit: The patient's condition can be safely assessed and treated via synchronous audio and visual telemedicine encounter.      Reason for Telemedicine Visit: Patient convenience (e.g. access to timely appointments / distance to available provider)    Originating Site (Patient Location): Patient's home    Distant Site (Provider Location): [facility]    Consent:  The patient/guardian has verbally consented to: the potential risks and benefits of telemedicine (video visit) versus in person care; bill my insurance or make self-payment for services provided; and responsibility for payment of non-covered services.     Patient would like the video invitation sent by:  My Chart    Mode of Communication:  Video Conference via [facility]    Distant Location (Provider):  On-site    As the provider I attest to compliance with applicable laws and regulations related to telemedicine.    DSM-5 Diagnoses:  Encounter Diagnoses   Name Primary?    Gender dysphoria in adult Yes    Major depressive disorder, recurrent, severe with psychotic features (H)     Generalized anxiety disorder      Current Reported Symptoms and Status update:  Changes since last session- client reports continued dysphoria, challenges navigating people pleasing, paranoia, internalized fear, shame, misogyny, transphobia.     Progress Toward Treatment Goals:   Satisfactory progress     Therapeutic Interventions/Treatment  Strategies:    Area(s) of treatment focus addressed in this session included Interpersonal Relationship Skills, Sexual Health and Wellness, and Gender Health    Psychotherapist offered support, feedback and validation, provided redirection, and reinforced use of skills Treatment modalities used include Narrative Therapy [facility] Interpersonal Other: Explored with patient how life transitions may impact mental health and functioning  and Discussed ways to increase hopefulness, Relationship Skills: Assisted patients in implementing more effective communication skills in their relationships and Discussed strategies to promote healthier understanding of interpersonal relationships, and explored challenging unrealistic expectations of self/others  Support, Redirection, and Feedback    Patient Response:   Patient responded to session by verbalizing understanding and actively engaged  Possible barriers to participation / learning include: N/A    Current Mental Status Exam:   Appearance:  Appropriate   Eye Contact:  Good   Attitude / Demeanor: Cooperative   Speech      Rate / Production: Normal/ Responsive      Volume:  Normal  volume  Orientation:  All  Mood:   Normal  Affect:   Appropriate   Thought Content: Clear   Insight:   Good       Plan/Need for Future Services:  Return for therapy in 2 weeks to treat diagnosed problems.    Patient has a current master individualized treatment plan.  See Epic treatment plan for more information.    Referral / Collaboration:  Referral to another professional/service is not indicated at this time..  Emergency Services Needed?  No    Assignment:  None    Interactive Complexity:  There are four specific communication difficulties that complicate the work of the primary psychiatric procedure.  Interactive complexity (+41549) may be reported when at least one of these difficulties is present.    Communication difficulties present during current the psychiatric procedure  include:  None.      Signature/Title:    Tenisha Stewart PsyD

## 2024-11-26 NOTE — PROGRESS NOTES
"[facility] Progress Note    Date of Service: 24      Name: Kd Reyes)  : 1995  Medical Record Number: [id number]  Treating Provider: Candi Mera, PhD   Type of Session: group  Present in Session: Client with other group members  Type of Session: Group  Number of Minutes: 120 mins   Therapist(s): Candi Mera, PhD, LP     Video start time: 6:30 PM  Video end time: 8:30 PM     SERVICE MODALITY:  In Person      DSM-5 Diagnoses:  F64.0 - Gender Dysphoria in Adolescent and Adult   296.33 Recurrent Major depression      Current Reported Symptoms and Status update:  Experiences gender dysphoria;  ups and down with mood and self-perception, experiences paranoia and struggles in interpersonal relationships.      Changes since last session: handling break-up with girlfriend; feeling good about gender expression rather than focusing on identity.    Progress Toward Treatment Goals:   Satisfactory      Therapeutic Interventions/Treatment Strategies:     Area(s) of treatment focus addressed in this session included Symptom Management, Interpersonal Relationship Skills, Gender Health and Wellness       Cognitive behavioral, interpersonal, and group therapy interventions were utilized to explore gender dysphoria (i.e., transition, interpersonal relationships, and medical interventions) and mental health symptoms, specifically depression and anxiety.  Client shared their experiences as a child as feedback for another group member in a way that connected her to the group member.  Shared about self-love and how she is starting to have a buffer in the \"gaping hole.\"     Treatment modalities used include [facility] THERAPY INTERVENTIONS: Motivational Interviewing Group Dynamic Therapy  Behavioral Activation: Explored how behaviors effect mood and interact with thoughts and feelings;  Promoting gender health and resiliency.      Patient Response:   Patient responded to session by " listening and actively engaged.      Possible barriers to participation / learning include: some self-described paranoia      Current Mental Status Exam:   Appearance:  Appropriate   Eye Contact:  Good   Attitude / Demeanor: Cooperative and engaged  Speech      Rate / Production: Normal/ Responsive      Volume:  Normal  volume  Orientation:  All  Mood:   Euthymic and reflective; some anger  Affect:   Congruent  Thought Content: Clear   Insight:   Good      Plan/Need for Future Services:  Return for group therapy in 2 weeks to treat diagnosed problems.    Patient has a current master individualized treatment plan.  See Epic treatment plan for more information.     Referral / Collaboration:  Referral to another professional/service is not indicated at this time.  Emergency Services Needed?  No     Assignment:  none     Interactive Complexity:  There are four specific communication difficulties that complicate the work of the primary psychiatric procedure.  Interactive complexity (+83422) may be reported when at least one of these difficulties is present.     Communication difficulties present during current the psychiatric procedure include:  None.       Candi Mera, PhD  Licensed Psychologist

## 2024-12-03 ENCOUNTER — VIRTUAL VISIT (OUTPATIENT)
Dept: PSYCHOLOGY | Facility: CLINIC | Age: 29
End: 2024-12-03
Payer: COMMERCIAL

## 2024-12-03 DIAGNOSIS — F33.3 MAJOR DEPRESSIVE DISORDER, RECURRENT, SEVERE WITH PSYCHOTIC FEATURES (H): Primary | ICD-10-CM

## 2024-12-03 DIAGNOSIS — F64.0 GENDER DYSPHORIA IN ADULT: ICD-10-CM

## 2024-12-03 DIAGNOSIS — F41.1 GENERALIZED ANXIETY DISORDER: ICD-10-CM

## 2024-12-03 PROCEDURE — 90837 PSYTX W PT 60 MINUTES: CPT | Mod: 95 | Performed by: MARRIAGE & FAMILY THERAPIST

## 2024-12-03 NOTE — PROGRESS NOTES
"Virtual Visit Details    Type of service:  Video Visit     Originating Location (pt. Location): {video visit patient location:[address]807::\"Home\"}  {PROVIDER LOCATION On-site should be selected for visits conducted from your clinic location or adjoining [facility] hospital, academic office, or other nearby [facility] building. Off-site should be selected for all other provider locations, including home:[address]}  Distant Location (provider location):  {virtual location provider:255134}  Platform used for Video Visit: {Virtual Visit Platforms:431491::\"[employer]\"}        [facility] - Progress Note    Date of Service: 24   Name: Kd Slater  : 1995  Medical Record Number: [id number]  Treating Provider: Tenisha Stewart PsyD  Type of Session: {[facility] Session Type:91232357}  Present in Session: ***  Session Start and Stop Time: ***-***  Number of Minutes:  ***    SERVICE MODALITY:  {Service Modality:860936}    DSM-5 Diagnoses:  ***    Current Reported Symptoms and Status update:  Changes since last session***    Progress Toward Treatment Goals:   {Progress Described:271398}    Therapeutic Interventions/Treatment Strategies:    Area(s) of treatment focus addressed in this session included { AREA OF TREATMENT FOCUS:491254}    ***    {[facility] progress note modalities:724243}  {OP BEH ADULT  THERAPEUTIC INTERVENTIONS & TX STRAT:136426}    Patient Response:   Patient responded to session by {PATIENT RESPONSE:306366}  Possible barriers to participation / learning include: {POSSIBLE BARRIERS:467429}    Current Mental Status Exam:   Appearance:  {Appearance:284707}  Eye Contact:  {Eye Contact:915885}  Attitude / Demeanor: {Attitude:775667}  Speech      Rate / Production: {Rate/Production:026296}      Volume:  {Volume:744687} volume  Orientation:  {Orientation:493199}  Mood:   {Mood:992303}  Affect:   {Affect:143780}  Thought Content: {Thought Content:889690}  Insight:   {Insight:834982}      Plan/Need for Future " Services:  Return for therapy in *** weeks to treat diagnosed problems.    Patient has {OP MH PROGRAMMATIC TX PLAN STATUS:109575}    Referral / Collaboration:  {Referral to Professional:872438}.  Emergency Services Needed?  {74654 (16-60 min) / 58049 (30 min add-on; stackable if needed):468984}    Assignment:  ***    Interactive Complexity:  There are four specific communication difficulties that complicate the work of the primary psychiatric procedure.  Interactive complexity (+85601) may be reported when at least one of these difficulties is present.    Communication difficulties present during current the psychiatric procedure include:  {[facility] INTERACTIVE COMPLEXITY:39221469}      Signature/Title:    Tenisha Stewart PsyD        difficulties is present.    Communication difficulties present during current the psychiatric procedure include:  None.      Signature/Title:    Tenisha Stewart PsyD

## 2024-12-03 NOTE — NURSING NOTE
Is the patient currently in the state of [place]? YES    Current patient location: [address] [place] [address]    Visit mode:VIDEO    If the visit is dropped, the patient can be reconnected by: VIDEO VISIT:  Send e-mail to at [email or web address]    Will anyone else be joining the visit? No  (If patient encounters technical issues they should call [phone])    Are changes needed to the allergy or medication list? N/A    Are refills needed on medications prescribed by this physician? No    Rooming Documentation: Questionnaire(s) not done per department protocol.    Reason for visit: RECHECK     Justine Hobbs, MIKEF

## 2024-12-09 ENCOUNTER — OFFICE VISIT (OUTPATIENT)
Dept: PSYCHOLOGY | Facility: CLINIC | Age: 29
End: 2024-12-09
Payer: COMMERCIAL

## 2024-12-09 DIAGNOSIS — F33.3 MAJOR DEPRESSIVE DISORDER, RECURRENT, SEVERE WITH PSYCHOTIC FEATURES (H): ICD-10-CM

## 2024-12-09 DIAGNOSIS — F64.0 GENDER DYSPHORIA IN ADULT: Primary | ICD-10-CM

## 2024-12-10 NOTE — PROGRESS NOTES
"[facility] - Progress Note    Date of Service: 24      Name: Kd Reyes)  : 1995  Medical Record Number: [id number]  Treating Provider: Candi Mera, PhD   Type of Session: group  Present in Session: Client with other group members  Type of Session: Group  Number of Minutes: 120 mins   Therapist(s): Candi Mera, PhD, LP     Video start time: 6:30 PM  Video end time: 8:30 PM     SERVICE MODALITY:  In Person      DSM-5 Diagnoses:  F64.0 - Gender Dysphoria in Adolescent and Adult   296.33 Recurrent Major depression      Current Reported Symptoms and Status update:  Experiences gender dysphoria;  ups and down with mood and self-perception, experiences paranoia and struggles in interpersonal relationships.      Changes since last session: saw family and felt anxious enough that she didn't feel she could be herself;  asserted self at work     Progress Toward Treatment Goals:   Satisfactory      Therapeutic Interventions/Treatment Strategies:     Area(s) of treatment focus addressed in this session included Symptom Management, Interpersonal Relationship Skills, Gender Health and Wellness       Cognitive behavioral, interpersonal, and group therapy interventions were utilized to explore gender dysphoria (i.e., transition, interpersonal relationships, and medical interventions) and mental health symptoms, specifically depression and anxiety.  Client shared how she was supported by trans co-workers about the negativity she has been getting from co-workers.  Management responded positively and she felt like she could be herself.  Is also exploring psychosis symptoms and managing the voices much better.  Took time on asking the group their perspective on a situation involving potentially \"babying mental health artists.\"    Treatment modalities used include [facility] THERAPY INTERVENTIONS: Motivational Interviewing Group Dynamic Therapy  Behavioral Activation: Explored how " behaviors effect mood and interact with thoughts and feelings;  Promoting gender health and resiliency.      Patient Response:   Patient responded to session by listening and actively engaged.      Possible barriers to participation / learning include: some self-described paranoia and psychosis symptoms     Current Mental Status Exam:   Appearance:  Appropriate   Eye Contact:  Good   Attitude / Demeanor: Cooperative and engaged  Speech      Rate / Production: Normal/ Responsive      Volume:  Normal  volume  Orientation:  All  Mood:   Euthymic   Affect:   Congruent  Thought Content: Clear   Insight:   Good      Plan/Need for Future Services:  Return for group therapy in 2 weeks to treat diagnosed problems.    Patient has a current master individualized treatment plan.  See Epic treatment plan for more information.     Referral / Collaboration:  Referral to another professional/service is not indicated at this time.  Emergency Services Needed?  No     Assignment:  none     Interactive Complexity:  There are four specific communication difficulties that complicate the work of the primary psychiatric procedure.  Interactive complexity (+70396) may be reported when at least one of these difficulties is present.     Communication difficulties present during current the psychiatric procedure include:  None.       Candi Mera, PhD  Licensed Psychologist

## 2024-12-19 ENCOUNTER — TELEPHONE (OUTPATIENT)
Dept: PSYCHOLOGY | Facility: CLINIC | Age: 29
End: 2024-12-19
Payer: COMMERCIAL

## 2024-12-19 NOTE — TELEPHONE ENCOUNTER
Pt called in to ask about any  availability with Tenisha Stewart for this week. Writer informed pt that Tenisha does not have any other appts available this week. Pt states she is in active psychosis and is hearing voices and needs support. Writer asked pt if she was safe and pt said yes. Writer provided pt with crisis and walk-in resources for mental health. Pt reports she will likely follow up with the [facility] crisis response resource and/or Walk-In Counseling Services. Writer encouraged pt to follow up with clinic if needed.    Routing to Tenisha Stewart as AGAPITO Mckenna on 12/19/2024 at 10:12 AM    
Admission

## 2025-01-06 ENCOUNTER — VIRTUAL VISIT (OUTPATIENT)
Dept: PSYCHOLOGY | Facility: CLINIC | Age: 30
End: 2025-01-06
Payer: COMMERCIAL

## 2025-01-06 DIAGNOSIS — F33.3 MAJOR DEPRESSIVE DISORDER, RECURRENT, SEVERE WITH PSYCHOTIC FEATURES (H): Primary | ICD-10-CM

## 2025-01-06 DIAGNOSIS — F64.0 GENDER DYSPHORIA IN ADULT: ICD-10-CM

## 2025-01-06 PROCEDURE — 90837 PSYTX W PT 60 MINUTES: CPT | Mod: 95 | Performed by: MARRIAGE & FAMILY THERAPIST

## 2025-01-06 NOTE — PROGRESS NOTES
[facility] - Progress Note    Date of Service: 25   Name: Kd Slater  : 1995  Medical Record Number: [id number]  Treating Provider: Tenisha Stewart PsyD   Type of Session: Individual  Present in Session: Client  Session Start and Stop Time: 8:04-9  Number of Minutes:  56    SERVICE MODALITY:  Video Visit:      Provider verified identity through the following two step process.  Patient provided:  Patient was verified at admission/transfer    Telemedicine Visit: The patient's condition can be safely assessed and treated via synchronous audio and visual telemedicine encounter.      Reason for Telemedicine Visit: Patient convenience (e.g. access to timely appointments / distance to available provider)    Originating Site (Patient Location): Patient's home    Distant Site (Provider Location): [facility]    Consent:  The patient/guardian has verbally consented to: the potential risks and benefits of telemedicine (video visit) versus in person care; bill my insurance or make self-payment for services provided; and responsibility for payment of non-covered services.     Patient would like the video invitation sent by:  My Chart    Mode of Communication:  Video Conference via [facility]    Distant Location (Provider):  On-site    As the provider I attest to compliance with applicable laws and regulations related to telemedicine.    DSM-5 Diagnoses:  Encounter Diagnoses   Name Primary?    Gender dysphoria in adult     Major depressive disorder, recurrent, severe with psychotic features (H) Yes     Current Reported Symptoms and Status update:  Changes since last session- increase in psychotic symptoms including AH, paranoia, depersonalization, altered thought processing. Client reports guilt/shame, weight change, sleep disturbance, low mood, lack of interest, rumination, gender dysphoria.        Progress Toward Treatment Goals:   Worsening      [place] [facility] [facility] [place]19, started Abilify, met with [facility] after having a hard time. Indicated intrusive thoughts mask as auditory hallucinations. Calling in to work daily.    Therapeutic Interventions/Treatment Strategies:    Area(s) of treatment focus addressed in this session included Symptom Management, Personal Safety/Harm Reduction, and Gender Health    Psychotherapist offered support, feedback and validation, provided redirection, and reinforced use of skills Treatment modalities used include Solution Focused Therapy Interpersonal Cognitive Restructuring:  Explored impact of ineffective thoughts / distortions on mood and activity, Coping Skills: Promoted understanding of how and when to apply grounding strategies to reduce distress and increase presence in the moment, and Other: Explored with patient how life transitions may impact mental health and functioning   Support, Feedback, Safety Assessments, and Structured Activity    Patient Response:   Patient responded to session by verbalizing understanding and actively engaged  Possible barriers to participation / learning include: N/A    Current Mental Status Exam:   Appearance:  Appropriate   Eye Contact:  Good   Attitude / Demeanor: Cooperative   Speech      Rate / Production: Normal/ Responsive      Volume:  Normal  volume  Orientation:  All  Mood:   Normal  Affect:   Appropriate   Thought Content: Clear   Insight:   Fair       Plan/Need for Future Services:  Return for therapy in 1-2 weeks to treat diagnosed problems.    Patient has a current master individualized treatment plan.  See Epic treatment plan for more information.    Referral / Collaboration:  The following referral(s) will be initiated: psychiatry .  Emergency Services Needed?  No    Assignment:  None    Interactive Complexity:  There are four specific communication difficulties that complicate the work of the primary psychiatric procedure.  Interactive complexity  (+36719) may be reported when at least one of these difficulties is present.    Communication difficulties present during current the psychiatric procedure include:  None.      Signature/Title:    Tenisha Stewart PsyD

## 2025-01-06 NOTE — NURSING NOTE
Is the patient currently in the state of [place]? YES    Current patient location: [address] [place] [id number]    Visit mode:VIDEO    If the visit is dropped, the patient can be reconnected by: VIDEO VISIT:  Send e-mail to at [email or web address]    Will anyone else be joining the visit? No  (If patient encounters technical issues they should call [phone])    Are changes needed to the allergy or medication list? N/A    Are refills needed on medications prescribed by this physician? No    Rooming Documentation: Questionnaire(s) not done per department protocol.    Reason for visit: RECHECK     Justine Hobbs, MIKEF

## 2025-01-09 ENCOUNTER — OFFICE VISIT (OUTPATIENT)
Dept: PSYCHIATRY | Facility: CLINIC | Age: 30
End: 2025-01-09
Payer: COMMERCIAL

## 2025-01-09 VITALS
SYSTOLIC BLOOD PRESSURE: 115 MMHG | DIASTOLIC BLOOD PRESSURE: 61 MMHG | BODY MASS INDEX: 20.55 KG/M2 | HEART RATE: 82 BPM | WEIGHT: 135.6 LBS | HEIGHT: 68 IN

## 2025-01-09 DIAGNOSIS — F41.1 GENERALIZED ANXIETY DISORDER: ICD-10-CM

## 2025-01-09 DIAGNOSIS — F33.3 MAJOR DEPRESSIVE DISORDER, RECURRENT, SEVERE WITH PSYCHOTIC FEATURES (H): Primary | ICD-10-CM

## 2025-01-09 DIAGNOSIS — F51.04 PSYCHOPHYSIOLOGICAL INSOMNIA: ICD-10-CM

## 2025-01-09 DIAGNOSIS — F43.10 PTSD (POST-TRAUMATIC STRESS DISORDER): ICD-10-CM

## 2025-01-09 DIAGNOSIS — F90.9 ATTENTION DEFICIT HYPERACTIVITY DISORDER (ADHD), UNSPECIFIED ADHD TYPE: ICD-10-CM

## 2025-01-09 PROCEDURE — 99204 OFFICE O/P NEW MOD 45 MIN: CPT | Performed by: STUDENT IN AN ORGANIZED HEALTH CARE EDUCATION/TRAINING PROGRAM

## 2025-01-09 RX ORDER — ARIPIPRAZOLE 15 MG/1
15 TABLET ORAL DAILY
Qty: 30 TABLET | Refills: 0 | Status: SHIPPED | OUTPATIENT
Start: 2025-01-09 | End: 2025-02-08

## 2025-01-09 RX ORDER — OLANZAPINE 5 MG/1
TABLET ORAL
Qty: 30 TABLET | Refills: 0 | Status: SHIPPED | OUTPATIENT
Start: 2025-01-09

## 2025-01-09 RX ORDER — ARIPIPRAZOLE 10 MG/1
10 TABLET ORAL
COMMUNITY
Start: 2024-12-19 | End: 2025-01-09

## 2025-01-09 RX ORDER — PROGESTERONE 100 MG/1
100 CAPSULE ORAL EVERY EVENING
COMMUNITY
Start: 2024-09-02
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
## 2025-01-13 ENCOUNTER — VIRTUAL VISIT (OUTPATIENT)
Dept: PSYCHOLOGY | Facility: CLINIC | Age: 30
End: 2025-01-13
Payer: COMMERCIAL

## 2025-01-13 ENCOUNTER — OFFICE VISIT (OUTPATIENT)
Dept: PSYCHOLOGY | Facility: CLINIC | Age: 30
End: 2025-01-13
Payer: COMMERCIAL

## 2025-01-13 DIAGNOSIS — F33.3 MAJOR DEPRESSIVE DISORDER, RECURRENT, SEVERE WITH PSYCHOTIC FEATURES (H): ICD-10-CM

## 2025-01-13 DIAGNOSIS — F64.0 GENDER DYSPHORIA IN ADULT: Primary | ICD-10-CM

## 2025-01-13 NOTE — PROGRESS NOTES
[facility] - Progress Note    Date of Service: 25   Name: Kd Slater  : 1995  Medical Record Number: [id number]  Treating Provider: Tenisha Stewart PsyD    Type of Session: Individual  Present in Session: client  Session Start and Stop Time: 9:01-9:55  Number of Minutes: 54    SERVICE MODALITY:  Video Visit:      Provider verified identity through the following two step process.  Patient provided:  Patient was verified at admission/transfer    Telemedicine Visit: The patient's condition can be safely assessed and treated via synchronous audio and visual telemedicine encounter.      Reason for Telemedicine Visit: Patient convenience (e.g. access to timely appointments / distance to available provider)    Originating Site (Patient Location): Patient's home    Distant Site (Provider Location): [facility]    Consent:  The patient/guardian has verbally consented to: the potential risks and benefits of telemedicine (video visit) versus in person care; bill my insurance or make self-payment for services provided; and responsibility for payment of non-covered services.     Patient would like the video invitation sent by:  My Chart    Mode of Communication:  Video Conference via [facility]    Distant Location (Provider):  On-site    As the provider I attest to compliance with applicable laws and regulations related to telemedicine.    DSM-5 Diagnoses:  Encounter Diagnoses   Name Primary?    Gender dysphoria in adult Yes    Major depressive disorder, recurrent, severe with psychotic features (H)          Current Reported Symptoms and Status update:  Changes since last session- client reports grandfather  last night, coping as best she can. Client endorses continued intrusive thoughts that are more understandable with increased Abilify, continued dysphoria and challenges with negative self-concept, lack of agency and focus on external  validation. Continued questioning around gender, around good vs bad, shame and blame.    Progress Toward Treatment Goals:   Minimal progress     Therapeutic Interventions/Treatment Strategies:    Area(s) of treatment focus addressed in this session included Symptom Management, Personal Safety/Harm Reduction, and Gender Health        Psychotherapist offered support, feedback and validation, provided redirection, and reinforced use of skills Treatment modalities used include Narrative Therapy Gender Affirming Care Interpersonal Cognitive Restructuring:  Assisted patient in formulating new neutral/positive alternatives to challenge less helpful / ineffective thoughts, Coping Skills: Facilitated understanding of  what factors may contribute to symptom relapse and skills plan to manage symptom relapse , and Symptoms Management: Promoted understanding of their diagnoses and how it impacts their functioning  Support, Redirection, and Feedback    Patient Response:   Patient responded to session by verbalizing understanding and actively engaged  Possible barriers to participation / learning include: N/A    Current Mental Status Exam:   Appearance:  Appropriate   Eye Contact:  Good   Attitude / Demeanor: Cooperative   Speech      Rate / Production: Normal/ Responsive      Volume:  Normal  volume  Orientation:  All  Mood:   Normal  Affect:   Appropriate   Thought Content: Clear   Insight:   Good       Plan/Need for Future Services:  Return for therapy in 1-2 weeks to treat diagnosed problems.    Patient has a current master individualized treatment plan.  See Epic treatment plan for more information.    Referral / Collaboration:  Referral to another professional/service is not indicated at this time..  Emergency Services Needed?  No    Assignment:  None    Interactive Complexity:  There are four specific communication difficulties that complicate the work of the primary psychiatric procedure.  Interactive complexity (+10034)  may be reported when at least one of these difficulties is present.    Communication difficulties present during current the psychiatric procedure include:  None.      Signature/Title:    Tenisha Stewart PsyD

## 2025-01-13 NOTE — NURSING NOTE
Current patient location: [address] [place] [address]    Is the patient currently in the state of [place]? YES    Visit mode: VIDEO    If the visit is dropped, the patient can be reconnected by:VIDEO VISIT: Text to cell phone:   Telephone Information:   Mobile [phone]       Will anyone else be joining the visit? NO  (If patient encounters technical issues they should call [phone]    Are changes needed to the allergy or medication list? N/A    Are refills needed on medications prescribed by this physician? NO    Rooming Documentation:  Not applicable    Reason for visit: DIEGO ALFARO

## 2025-01-14 NOTE — PROGRESS NOTES
"[facility] - Progress Note    Date of Service: 25      Name: Kd Reyes)  : 1995  Medical Record Number: [id number]  Treating Provider: Candi Mera, PhD   Type of Session: group  Present in Session: Client with other group members  Type of Session: Group  Number of Minutes: 120 mins   Therapist(s): Candi Mera, PhD, LP     Video start time: 6:30 PM  Video end time: 8:30 PM     SERVICE MODALITY:  In Person      DSM-5 Diagnoses:  F64.0 - Gender Dysphoria in Adolescent and Adult   296.33 Recurrent Major depression      Current Reported Symptoms and Status update:  Experiences gender dysphoria;  ups and down with mood and self-perception, experiences paranoia and struggles in interpersonal relationships.      Changes since last session: presented in gender at [place] with family which felt \"weird.\"  Reports doing better in terms of psychotic thoughts but worse in general mental health (eating regularly, using too much weed, being \"out of it\").     Progress Toward Treatment Goals:   Satisfactory      Therapeutic Interventions/Treatment Strategies:     Area(s) of treatment focus addressed in this session included Symptom Management, Interpersonal Relationship Skills, Gender Health and Wellness       Cognitive behavioral, interpersonal, and group therapy interventions were utilized to explore gender dysphoria (i.e., transition, interpersonal relationships, and medical interventions) and mental health symptoms, specifically depression and anxiety. Dav participated in a group discussion about group rules, group boundaries, how to uplift the integrity of group relationships when in personal spaces, and how the use of substances can add to messy situations and decisions that undermine the integrity of the group. Stated the importance of the group as a safe and respected space for herself and one that she does not want to jeopardize.      Treatment modalities used " include [facility] THERAPY INTERVENTIONS: Motivational Interviewing Group Dynamic Therapy  Behavioral Activation: Explored how behaviors effect mood and interact with thoughts and feelings;  Promoting gender health and resiliency.      Patient Response:   Patient responded to session by listening and actively engaged.      Possible barriers to participation / learning include: some self-described paranoia and psychosis symptoms     Current Mental Status Exam:   Appearance:  Appropriate   Eye Contact:  Good   Attitude / Demeanor: Cooperative and engaged  Speech      Rate / Production: Normal/ Responsive      Volume:  Normal  volume  Orientation:  All  Mood:   Euthymic   Affect:   Congruent  Thought Content: Clear   Insight:   Good      Plan/Need for Future Services:  Return for group therapy in 2 weeks to treat diagnosed problems.    Patient has a current master individualized treatment plan.  See Epic treatment plan for more information.     Referral / Collaboration:  Referral to another professional/service is not indicated at this time.  Emergency Services Needed?  No     Assignment:  none     Interactive Complexity:  There are four specific communication difficulties that complicate the work of the primary psychiatric procedure.  Interactive complexity (+99743) may be reported when at least one of these difficulties is present.     Communication difficulties present during current the psychiatric procedure include:  None.       Candi Mera, PhD  Licensed Psychologist

## 2025-01-27 ENCOUNTER — OFFICE VISIT (OUTPATIENT)
Dept: PSYCHOLOGY | Facility: CLINIC | Age: 30
End: 2025-01-27
Payer: COMMERCIAL

## 2025-01-27 ENCOUNTER — VIRTUAL VISIT (OUTPATIENT)
Dept: PSYCHOLOGY | Facility: CLINIC | Age: 30
End: 2025-01-27
Payer: COMMERCIAL

## 2025-01-27 DIAGNOSIS — F64.0 GENDER DYSPHORIA IN ADULT: ICD-10-CM

## 2025-01-27 DIAGNOSIS — F64.0 GENDER DYSPHORIA IN ADULT: Primary | ICD-10-CM

## 2025-01-27 DIAGNOSIS — F33.3 MAJOR DEPRESSIVE DISORDER, RECURRENT, SEVERE WITH PSYCHOTIC FEATURES (H): Primary | ICD-10-CM

## 2025-01-27 PROCEDURE — 90837 PSYTX W PT 60 MINUTES: CPT | Mod: 95 | Performed by: MARRIAGE & FAMILY THERAPIST

## 2025-01-27 NOTE — PROGRESS NOTES
[facility] - Progress Note    Date of Service: 25   Name: Kd Slater  : 1995  Medical Record Number: [id number]  Treating Provider: Tenisha Stewart PsyD  Type of Session: Individual  Present in Session: client  Session Start and Stop Time: 9-10  Number of Minutes:  60    SERVICE MODALITY:  Video Visit:      Provider verified identity through the following two step process.  Patient provided:  Patient was verified at admission/transfer    Telemedicine Visit: The patient's condition can be safely assessed and treated via synchronous audio and visual telemedicine encounter.      Reason for Telemedicine Visit: Patient convenience (e.g. access to timely appointments / distance to available provider)    Originating Site (Patient Location): Patient's home    Distant Site (Provider Location): [facility]    Consent:  The patient/guardian has verbally consented to: the potential risks and benefits of telemedicine (video visit) versus in person care; bill my insurance or make self-payment for services provided; and responsibility for payment of non-covered services.     Patient would like the video invitation sent by:  My Chart    Mode of Communication:  Video Conference via [facility]    Distant Location (Provider):  On-site    As the provider I attest to compliance with applicable laws and regulations related to telemedicine.    DSM-5 Diagnoses:  Encounter Diagnoses   Name Primary?    Gender dysphoria in adult     Major depressive disorder, recurrent, severe with psychotic features (H) Yes     Current Reported Symptoms and Status update:  Changes since last session- increased experiences of psychotic features, disorganized thinking, paranoid thought patterns,low mood, feeling down, hopeless, shame/guilt, worthlessness congruent with long standing depression. Client reports dysphoria continues to be high. Client reports increase in cannabis use (at  least 1x/daily cannabis cigarettes).     Progress Toward Treatment Goals:   No improvement     Therapeutic Interventions/Treatment Strategies:    Area(s) of treatment focus addressed in this session included Symptom Management, Interpersonal Relationship Skills, Gender Health, and Physical Health       Psychotherapist offered support, feedback and validation, set limits, and provided redirection Treatment modalities used include Solution Focused Therapy Gender Affirming Care Interpersonal Behavioral Activation: Reinforced benefits/challenges of change process through applying skills to replace unwanted behaviors, Relapse Prevention: Discussed the use of substances and its impact on their relationships, Symptoms Management: Promoted understanding of their diagnoses and how it impacts their functioning, and explored challenging unrealistic expectations of self/others  Support, Redirection, Feedback, and Education    Patient Response:   Patient responded to session by verbalizing understanding and actively engaged  Possible barriers to participation / learning include: severity of symptoms    Current Mental Status Exam:   Appearance:  Appropriate   Eye Contact:  Good   Attitude / Demeanor: Cooperative   Speech      Rate / Production: Normal/ Responsive      Volume:  Normal  volume  Orientation:  All  Mood:   Depressed   Affect:   Tearful  Thought Content: Clear   Insight:   Good       Plan/Need for Future Services:  Return for therapy in 1-2 weeks to treat diagnosed problems.    Patient has a current master individualized treatment plan.  See Epic treatment plan for more information.    Referral / Collaboration:  Referral to another professional/service is not indicated at this time..  Emergency Services Needed?  No    Assignment:  Reduce/stop cannabis use, prioritize journaling and self care. Follow up with Daryl in psychiatry.     Interactive Complexity:  There are four specific communication difficulties that  complicate the work of the primary psychiatric procedure.  Interactive complexity (+18031) may be reported when at least one of these difficulties is present.    Communication difficulties present during current the psychiatric procedure include:  None.      Signature/Title:    Tenisha Stewart PsyD

## 2025-01-27 NOTE — NURSING NOTE
Is the patient currently in the state of [place]? YES    Current patient location: [address] [place] [id number]    Visit mode:Video    If the visit is dropped, the patient can be reconnected by: VIDEO VISIT:  Send e-mail to at [email or web address]    Will anyone else be joining the visit? No  (If patient encounters technical issues they should call [phone])    Are changes needed to the allergy or medication list? N/A    Are refills needed on medications prescribed by this physician? No    Rooming Documentation: Questionnaire(s) not done per department protocol.    Reason for visit: RECHANGELINA James

## 2025-01-28 NOTE — PROGRESS NOTES
[facility] - Progress Note    Date of Service: 25      Name: Kd Reyes)  : 1995  Medical Record Number: [id number]  Treating Provider: Karol Carlos, PhD , LP, CST  Type of Session: group  Present in Session: Client with other group members  Type of Session: Group  Number of Minutes: 120 mins   Therapist(s): Karol Carlos, PhD, LP     Video start time: 6:30 PM  Video end time: 8:30 PM     SERVICE MODALITY:  In Person      DSM-5 Diagnoses:  F64.0 - Gender Dysphoria in Adolescent and Adult   296.33 Recurrent Major depression      Current Reported Symptoms and Status update:  Experiences gender dysphoria;  ups and down with mood and self-perception, experiences paranoia and struggles in interpersonal relationships.      Changes since last session: feeling unsafe being trans, psychotic symptoms, working on smoking less marijuana, challenging family norms around gender presentation    Progress Toward Treatment Goals:   Satisfactory      Therapeutic Interventions/Treatment Strategies:     Area(s) of treatment focus addressed in this session included Symptom Management, Interpersonal Relationship Skills, Gender Health and Wellness       Cognitive behavioral, interpersonal, and group therapy interventions were utilized to explore gender dysphoria (i.e., transition, interpersonal relationships, and medical interventions) and mental health symptoms, specifically depression and anxiety. Dav participated in a group discussion about group rules, group boundaries, how to uplift the integrity of group relationships when in personal spaces, and how the use of substances can add to messy situations and decisions that undermine the integrity of the group. Stated the importance of the group as a safe and respected space for herself and one that she does not want to jeopardize.      Treatment modalities used include [facility] THERAPY INTERVENTIONS: Motivational Interviewing Group  Dynamic Therapy  Behavioral Activation: Explored how behaviors effect mood and interact with thoughts and feelings;  Promoting gender health and resiliency.      Patient Response:   Patient responded to session by listening and actively engaged.      Possible barriers to participation / learning include: some self-described paranoia and psychosis symptoms     Current Mental Status Exam:   Appearance:  Appropriate   Eye Contact:  Good   Attitude / Demeanor: Cooperative and engaged  Speech      Rate / Production: Normal/ Responsive      Volume:  Normal  volume  Orientation:  All  Mood:   Euthymic   Affect:   Congruent  Thought Content: Clear   Insight:   Good      Plan/Need for Future Services:  Return for group therapy in 2 weeks to treat diagnosed problems.    Patient has a current master individualized treatment plan.  See Epic treatment plan for more information.     Referral / Collaboration:  Referral to another professional/service is not indicated at this time.  Emergency Services Needed?  No     Assignment:  none     Interactive Complexity:  There are four specific communication difficulties that complicate the work of the primary psychiatric procedure.  Interactive complexity (+78004) may be reported when at least one of these difficulties is present.     Communication difficulties present during current the psychiatric procedure include:  None.       I did not personally see the patient. I reviewed and agree with the assessment and plan of this note.       Pita Carlos, PhD, LP    Program in Human Sexuality, [facility]  Department of Family Medicine and Community Health  [employer]

## 2025-02-03 DIAGNOSIS — F33.3 MAJOR DEPRESSIVE DISORDER, RECURRENT, SEVERE WITH PSYCHOTIC FEATURES (H): ICD-10-CM

## 2025-02-03 RX ORDER — ARIPIPRAZOLE 15 MG/1
15 TABLET ORAL DAILY
Qty: 30 TABLET | Refills: 0 | Status: SHIPPED | OUTPATIENT
Start: 2025-02-03

## 2025-02-03 NOTE — TELEPHONE ENCOUNTER
Last seen: 01/09/25  RTC:     -I will manage their long term psychiatric care.   Cancel: 0  No-show: 0  Next appt: 0      Incoming refill from pharmacy via fax by pharmacy    Medication requested:   Pending Prescriptions:                       Disp   Refills    ARIPiprazole (ABILIFY) 15 MG tablet       30 tab*0            Sig: Take 1 tablet (15 mg) by mouth daily.              From chart note:    ARIPiprazole (ABILIFY) 15 MG tablet, Take 1 tablet (15 mg) by mouth daily., Disp: 30 tablet, Rfl: 0      Medication refill approved per refill protocol.

## 2025-02-10 ENCOUNTER — OFFICE VISIT (OUTPATIENT)
Dept: PSYCHOLOGY | Facility: CLINIC | Age: 30
End: 2025-02-10
Payer: COMMERCIAL

## 2025-02-10 ENCOUNTER — VIRTUAL VISIT (OUTPATIENT)
Dept: PSYCHOLOGY | Facility: CLINIC | Age: 30
End: 2025-02-10
Payer: COMMERCIAL

## 2025-02-10 DIAGNOSIS — F33.3 MAJOR DEPRESSIVE DISORDER, RECURRENT, SEVERE WITH PSYCHOTIC FEATURES (H): Primary | ICD-10-CM

## 2025-02-10 DIAGNOSIS — F41.1 GENERALIZED ANXIETY DISORDER: ICD-10-CM

## 2025-02-10 DIAGNOSIS — F33.3 MAJOR DEPRESSIVE DISORDER, RECURRENT, SEVERE WITH PSYCHOTIC FEATURES (H): ICD-10-CM

## 2025-02-10 DIAGNOSIS — F64.0 GENDER DYSPHORIA IN ADULT: ICD-10-CM

## 2025-02-10 DIAGNOSIS — F64.0 GENDER DYSPHORIA IN ADULT: Primary | ICD-10-CM

## 2025-02-10 PROCEDURE — 90853 GROUP PSYCHOTHERAPY: CPT | Performed by: PSYCHOLOGIST

## 2025-02-10 NOTE — NURSING NOTE
Is the patient currently in the state of [place]? YES    Current patient location: [address] [place] [id number]    Visit mode:Video    If the visit is dropped, the patient can be reconnected by: VIDEO VISIT:  Send e-mail to at [email or web address]    Will anyone else be joining the visit? No  (If patient encounters technical issues they should call [phone])    Are changes needed to the allergy or medication list? N/A    Are refills needed on medications prescribed by this physician? No    Rooming Documentation: Questionnaire(s) completed.    Reason for visit: RECHANGELINA James

## 2025-02-10 NOTE — PROGRESS NOTES
[facility] - Progress Note    Date of Service: 2/10/25   Name: Kd Slater  : 1995  Medical Record Number: [id number]  Treating Provider: Tenisha Stewart PsyD  Type of Session: Individual  Present in Session: Client  Session Start and Stop Time: 9:03-10  Number of Minutes:  57    SERVICE MODALITY:  Video Visit:      Provider verified identity through the following two step process.  Patient provided:  Patient was verified at admission/transfer    Telemedicine Visit: The patient's condition can be safely assessed and treated via synchronous audio and visual telemedicine encounter.      Reason for Telemedicine Visit: Patient convenience (e.g. access to timely appointments / distance to available provider)    Originating Site (Patient Location): Patient's home    Distant Site (Provider Location): [facility]    Consent:  The patient/guardian has verbally consented to: the potential risks and benefits of telemedicine (video visit) versus in person care; bill my insurance or make self-payment for services provided; and responsibility for payment of non-covered services.     Patient would like the video invitation sent by:  My Chart    Mode of Communication:  Video Conference via [facility]    Distant Location (Provider):  On-site    As the provider I attest to compliance with applicable laws and regulations related to telemedicine.    DSM-5 Diagnoses:  Encounter Diagnoses   Name Primary?    Gender dysphoria in adult     Major depressive disorder, recurrent, severe with psychotic features (H) Yes    Generalized anxiety disorder      Current Reported Symptoms and Status update:  Changes since last session- client reports increased social anxiety, gender dysphoria,     Progress Toward Treatment Goals:   No improvement     Therapeutic Interventions/Treatment Strategies:    Area(s) of treatment focus addressed in this session included Symptom Management  and Gender Health    Psychotherapist offered support, feedback and validation, provided redirection, and reinforced use of skills Treatment modalities used include Narrative Therapy Gender Affirming Care Interpersonal Behavioral Activation: Reinforced benefits/challenges of change process through applying skills to replace unwanted behaviors, Symptoms Management: Promoted understanding of their diagnoses and how it impacts their functioning, and discussed gender literacy  Support, Redirection, and Feedback    Patient Response:   Patient responded to session by verbalizing understanding and actively engaged  Possible barriers to participation / learning include: N/A    Current Mental Status Exam:   Appearance:  Appropriate   Eye Contact:  Good   Attitude / Demeanor: Cooperative   Speech      Rate / Production: Normal/ Responsive      Volume:  Normal  volume  Orientation:  All  Mood:   Normal  Affect:   Appropriate   Thought Content: Clear   Insight:   Good       Plan/Need for Future Services:  Return for therapy in 2 weeks to treat diagnosed problems.    Patient has a current master individualized treatment plan.  See Epic treatment plan for more information.    Referral / Collaboration:  Referral to another professional/service is not indicated at this time..  Emergency Services Needed?  No    Assignment:  None    Interactive Complexity:  There are four specific communication difficulties that complicate the work of the primary psychiatric procedure.  Interactive complexity (+34477) may be reported when at least one of these difficulties is present.    Communication difficulties present during current the psychiatric procedure include:  None.      Signature/Title:    Tenisha Stewart PsyD

## 2025-02-11 NOTE — PROGRESS NOTES
"[facility] - Progress Note    Date of Service: 2/10/25      Name: Kd Reyes)  : 1995  Medical Record Number: [id number]  Treating Provider: Candi Mera, PhD   Type of Session: group  Present in Session: Client with other group members  Type of Session: Group  Number of Minutes: 120 mins   Therapist(s): Candi Mera, PhD, LP     Video start time: 6:30 PM  Video end time: 8:30 PM     SERVICE MODALITY:  In Person      DSM-5 Diagnoses:  F64.0 - Gender Dysphoria in Adolescent and Adult   296.33 Recurrent Major depression      Current Reported Symptoms and Status update:  Experiences gender dysphoria;  ups and down with mood and self-perception, experiences paranoia and struggles in interpersonal relationships.      Changes since last session: reports having a good day; is exploring whether to get back together with previous girlfriend; work tasks change that she thinks will be a good fit; needs to find a place to live; had a good conversation with brother    Progress Toward Treatment Goals:   Satisfactory      Therapeutic Interventions/Treatment Strategies:     Area(s) of treatment focus addressed in this session included Symptom Management, Interpersonal Relationship Skills, Gender Health and Wellness       Cognitive behavioral, interpersonal, and group therapy interventions were utilized to explore gender dysphoria (i.e., transition, interpersonal relationships, and medical interventions) and mental health symptoms, specifically depression and anxiety. Dav shared that she realizes how important expressing her gender is to her mental health.  Processed with another group member her own journey of struggling to have open expression of her gender.  Also participated in a group discussion about a unique situation (getting feedback specifically on pics that are meant to be \"sexy\" in order to address challenges with negative self-assessment) that requires ongoing group " discussion about group rules, group boundaries, how to uplift the integrity of group relationships when in personal spaces.  Also participated in group discussion related to healthy coping in current political climate.     Treatment modalities used include [facility] THERAPY INTERVENTIONS: Motivational Interviewing Group Dynamic Therapy  Behavioral Activation: Explored how behaviors effect mood and interact with thoughts and feelings;  Promoting gender health and resiliency.      Patient Response:   Patient responded to session by listening and actively engaged.      Possible barriers to participation / learning include: some self-described paranoia and psychosis symptoms     Current Mental Status Exam:   Appearance:  Appropriate   Eye Contact:  Good   Attitude / Demeanor: Cooperative and engaged  Speech      Rate / Production: Normal/ Responsive      Volume:  Normal  volume  Orientation:  All  Mood:   Euthymic   Affect:   Congruent  Thought Content: Clear   Insight:   Good      Plan/Need for Future Services:  Return for group therapy in 2 weeks to treat diagnosed problems.    Patient has a current master individualized treatment plan.  See Epic treatment plan for more information.     Referral / Collaboration:  Referral to another professional/service is not indicated at this time.  Emergency Services Needed?  No     Assignment:  none     Interactive Complexity:  There are four specific communication difficulties that complicate the work of the primary psychiatric procedure.  Interactive complexity (+57317) may be reported when at least one of these difficulties is present.     Communication difficulties present during current the psychiatric procedure include:  None.       Candi Mera, PhD  Licensed Psychologist

## 2025-02-24 ENCOUNTER — OFFICE VISIT (OUTPATIENT)
Dept: PSYCHOLOGY | Facility: CLINIC | Age: 30
End: 2025-02-24
Payer: COMMERCIAL

## 2025-02-24 DIAGNOSIS — F33.3 MAJOR DEPRESSIVE DISORDER, RECURRENT, SEVERE WITH PSYCHOTIC FEATURES (H): ICD-10-CM

## 2025-02-24 DIAGNOSIS — F64.0 GENDER DYSPHORIA IN ADULT: Primary | ICD-10-CM

## 2025-02-24 PROCEDURE — 90853 GROUP PSYCHOTHERAPY: CPT | Performed by: PSYCHOLOGIST

## 2025-02-25 NOTE — PROGRESS NOTES
"[facility] - Progress Note    Date of Service: 25      Name: Kd Reyes)  : 1995  Medical Record Number: [id number]  Treating Provider: Candi Mera, PhD   Type of Session: group  Present in Session: Client with other group members  Type of Session: Group  Number of Minutes: 120 mins   Therapist(s): Candi Mera, PhD, LP     Video start time: 6:30 PM  Video end time: 8:30 PM     SERVICE MODALITY:  In Person      DSM-5 Diagnoses:  F64.0 - Gender Dysphoria in Adolescent and Adult   296.33 Recurrent Major depression      Current Reported Symptoms and Status update:  Experiences gender dysphoria;  ups and down with mood and self-perception, experiences paranoia and struggles in interpersonal relationships.      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  Feeling lonely and a lack of friendships which leads to a lot of negative self-talk.     Progress Toward Treatment Goals:   Satisfactory      Therapeutic Interventions/Treatment Strategies:     Area(s) of treatment focus addressed in this session included Symptom Management, Interpersonal Relationship Skills, Gender Health and Wellness       Cognitive behavioral, interpersonal, and group therapy interventions were utilized to explore gender dysphoria (i.e., transition, interpersonal relationships, and medical interventions) and mental health symptoms, specifically depression and anxiety. Dav requested time to look at the motivation behind the shifts in her identity that she is trying to sort out.  Believed that transitioning was going to make her not depressed and actually happy which did not happen.  Wonders \"am I just a sweeney man?\"  Also struggling with not being extroverted and the messages that she perceives are  negative towards her.  Made a " clear shift as she was sharing that was more supportive and logical and able to see the reality.      Treatment modalities used include [facility] THERAPY INTERVENTIONS: Motivational Interviewing Group Dynamic Therapy  Behavioral Activation: Explored how behaviors effect mood and interact with thoughts and feelings;  Promoting gender health and resiliency.      Patient Response:   Patient responded to session by listening and actively engaged.      Possible barriers to participation / learning include: some self-described paranoia and psychosis symptoms     Current Mental Status Exam:   Appearance:  Appropriate   Eye Contact:  Good   Attitude / Demeanor: Engaged and deeply feeling  Speech      Rate / Production: Normal/ Responsive      Volume:  Normal  volume  Orientation:  All  Mood:   Depressed and sad  Affect:   Congruent  Thought Content: Clear   Insight:   Good      Plan/Need for Future Services:  Return for group therapy in 2 weeks to treat diagnosed problems.    Patient has a current master individualized treatment plan.  See Epic treatment plan for more information.     Referral / Collaboration:  Referral to another professional/service is not indicated at this time.  Emergency Services Needed?  No     Assignment:  none     Interactive Complexity:  There are four specific communication difficulties that complicate the work of the primary psychiatric procedure.  Interactive complexity (+23799) may be reported when at least one of these difficulties is present.     Communication difficulties present during current the psychiatric procedure include:  None.       Candi Mera, PhD  Licensed Psychologist

## 2025-02-27 ENCOUNTER — VIRTUAL VISIT (OUTPATIENT)
Dept: PSYCHIATRY | Facility: CLINIC | Age: 30
End: 2025-02-27
Payer: COMMERCIAL

## 2025-02-27 DIAGNOSIS — F41.1 GENERALIZED ANXIETY DISORDER: Primary | ICD-10-CM

## 2025-02-27 DIAGNOSIS — F33.3 MAJOR DEPRESSIVE DISORDER, RECURRENT, SEVERE WITH PSYCHOTIC FEATURES (H): ICD-10-CM

## 2025-02-27 RX ORDER — ARIPIPRAZOLE 15 MG/1
15 TABLET ORAL DAILY
Qty: 30 TABLET | Refills: 0 | Status: SHIPPED | OUTPATIENT
Start: 2025-02-27

## 2025-02-27 RX ORDER — ESCITALOPRAM OXALATE 5 MG/1
5 TABLET ORAL DAILY
Qty: 30 TABLET | Refills: 1 | Status: SHIPPED | OUTPATIENT
Start: 2025-02-27

## 2025-02-27 RX ORDER — OLANZAPINE 5 MG/1
TABLET ORAL
Qty: 30 TABLET | Refills: 0 | Status: SHIPPED | OUTPATIENT
Start: 2025-02-27

## 2025-02-27 ASSESSMENT — PATIENT HEALTH QUESTIONNAIRE - PHQ9
SUM OF ALL RESPONSES TO PHQ QUESTIONS 1-9: 19
10. IF YOU CHECKED OFF ANY PROBLEMS, HOW DIFFICULT HAVE THESE PROBLEMS MADE IT FOR YOU TO DO YOUR WORK, TAKE CARE OF THINGS AT HOME, OR GET ALONG WITH OTHER PEOPLE: VERY DIFFICULT
SUM OF ALL RESPONSES TO PHQ QUESTIONS 1-9: 19

## 2025-02-27 ASSESSMENT — ANXIETY QUESTIONNAIRES
6. BECOMING EASILY ANNOYED OR IRRITABLE: MORE THAN HALF THE DAYS
3. WORRYING TOO MUCH ABOUT DIFFERENT THINGS: NEARLY EVERY DAY
6. BECOMING EASILY ANNOYED OR IRRITABLE: MORE THAN HALF THE DAYS
GAD7 TOTAL SCORE: 19
5. BEING SO RESTLESS THAT IT IS HARD TO SIT STILL: NEARLY EVERY DAY
GAD7 TOTAL SCORE: 19
GAD7 TOTAL SCORE: 19
7. FEELING AFRAID AS IF SOMETHING AWFUL MIGHT HAPPEN: MORE THAN HALF THE DAYS
7. FEELING AFRAID AS IF SOMETHING AWFUL MIGHT HAPPEN: MORE THAN HALF THE DAYS
4. TROUBLE RELAXING: NEARLY EVERY DAY
8. IF YOU CHECKED OFF ANY PROBLEMS, HOW DIFFICULT HAVE THESE MADE IT FOR YOU TO DO YOUR WORK, TAKE CARE OF THINGS AT HOME, OR GET ALONG WITH OTHER PEOPLE?: EXTREMELY DIFFICULT
IF YOU CHECKED OFF ANY PROBLEMS ON THIS QUESTIONNAIRE, HOW DIFFICULT HAVE THESE PROBLEMS MADE IT FOR YOU TO DO YOUR WORK, TAKE CARE OF THINGS AT HOME, OR GET ALONG WITH OTHER PEOPLE: EXTREMELY DIFFICULT
4. TROUBLE RELAXING: NEARLY EVERY DAY
5. BEING SO RESTLESS THAT IT IS HARD TO SIT STILL: NEARLY EVERY DAY
IF YOU CHECKED OFF ANY PROBLEMS ON THIS QUESTIONNAIRE, HOW DIFFICULT HAVE THESE PROBLEMS MADE IT FOR YOU TO DO YOUR WORK, TAKE CARE OF THINGS AT HOME, OR GET ALONG WITH OTHER PEOPLE: EXTREMELY DIFFICULT
8. IF YOU CHECKED OFF ANY PROBLEMS, HOW DIFFICULT HAVE THESE MADE IT FOR YOU TO DO YOUR WORK, TAKE CARE OF THINGS AT HOME, OR GET ALONG WITH OTHER PEOPLE?: EXTREMELY DIFFICULT
7. FEELING AFRAID AS IF SOMETHING AWFUL MIGHT HAPPEN: MORE THAN HALF THE DAYS
2. NOT BEING ABLE TO STOP OR CONTROL WORRYING: NEARLY EVERY DAY
3. WORRYING TOO MUCH ABOUT DIFFERENT THINGS: NEARLY EVERY DAY
1. FEELING NERVOUS, ANXIOUS, OR ON EDGE: NEARLY EVERY DAY
2. NOT BEING ABLE TO STOP OR CONTROL WORRYING: NEARLY EVERY DAY
GAD7 TOTAL SCORE: 19
1. FEELING NERVOUS, ANXIOUS, OR ON EDGE: NEARLY EVERY DAY
GAD7 TOTAL SCORE: 19

## 2025-02-27 ASSESSMENT — PAIN SCALES - GENERAL: PAINLEVEL_OUTOF10: NO PAIN (0)
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
## 2025-02-27 NOTE — NURSING NOTE
Current patient location: [address] [place] [address]    Is the patient currently in the state of [place]? YES    Visit mode: VIDEO    If the visit is dropped, the patient can be reconnected by:VIDEO VISIT: Send to e-mail at: [email or web address]    Will anyone else be joining the visit? NO  (If patient encounters technical issues they should call [phone] :[phone])    Are changes needed to the allergy or medication list? No    Are refills needed on medications prescribed by this physician? Discuss with provider    Rooming Documentation:  Questionnaire(s) completed    Reason for visit: RECHECK    Samir ALFARO

## 2025-03-03 ENCOUNTER — VIRTUAL VISIT (OUTPATIENT)
Dept: PSYCHOLOGY | Facility: CLINIC | Age: 30
End: 2025-03-03
Payer: COMMERCIAL

## 2025-03-03 DIAGNOSIS — F64.0 GENDER DYSPHORIA IN ADULT: ICD-10-CM

## 2025-03-03 DIAGNOSIS — F33.3 MAJOR DEPRESSIVE DISORDER, RECURRENT, SEVERE WITH PSYCHOTIC FEATURES (H): Primary | ICD-10-CM

## 2025-03-03 DIAGNOSIS — F41.1 GENERALIZED ANXIETY DISORDER: ICD-10-CM

## 2025-03-03 PROCEDURE — 90837 PSYTX W PT 60 MINUTES: CPT | Mod: 95 | Performed by: MARRIAGE & FAMILY THERAPIST

## 2025-03-03 NOTE — PROGRESS NOTES
[facility] - Progress Note    Date of Service: 3/03/25   Name: Kd Slater  : 1995  Medical Record Number: [id number]  Treating Provider: Tenisha Stewart PsyD  Type of Session: Individual  Present in Session: client  Session Start and Stop Time: :55  Number of Minutes:  55    SERVICE MODALITY:  Video Visit:      Provider verified identity through the following two step process.  Patient provided:  Patient was verified at admission/transfer    Telemedicine Visit: The patient's condition can be safely assessed and treated via synchronous audio and visual telemedicine encounter.      Reason for Telemedicine Visit: Patient convenience (e.g. access to timely appointments / distance to available provider)    Originating Site (Patient Location): Patient's home    Distant Site (Provider Location): [facility]    Consent:  The patient/guardian has verbally consented to: the potential risks and benefits of telemedicine (video visit) versus in person care; bill my insurance or make self-payment for services provided; and responsibility for payment of non-covered services.     Patient would like the video invitation sent by:  My Chart    Mode of Communication:  Video Conference via [facility]    Distant Location (Provider):  On-site    As the provider I attest to compliance with applicable laws and regulations related to telemedicine.    DSM-5 Diagnoses:  Encounter Diagnoses   Name Primary?    Gender dysphoria in adult     Major depressive disorder, recurrent, severe with psychotic features (H) Yes    Generalized anxiety disorder      Current Reported Symptoms and Status update:  Changes since last session low mood, feeling down, hopeless, shame/guilt, worthlessness congruent with long standing depression. Client reports dysphoria continues to be high. Reports distress related to uncertainty in romantic life.   Progress Toward Treatment Goals:   No  improvement     Therapeutic Interventions/Treatment Strategies:    Area(s) of treatment focus addressed in this session included Symptom Management, Interpersonal Relationship Skills, Sexual Health and Wellness, and Gender Health        Psychotherapist offered support, feedback and validation, provided redirection, and reinforced use of skills Treatment modalities used include Systemic Relational Therapy Sex Therapy Feminist Informed Interpersonal Symptoms Management: Promoted understanding of their diagnoses and how it impacts their functioning, Relationship Skills: Assisted patients in implementing more effective communication skills in their relationships and Encouraged development and maintenance  of healthy boundaries, and facilitated discussion about sexual health and wellness and explored comfort with sexual communication  Support, Feedback, Problem Solving, and Clarification    Patient Response:   Patient responded to session by verbalizing understanding and actively engaged  Possible barriers to participation / learning include: N/A    Current Mental Status Exam:   Appearance:  Appropriate   Eye Contact:  Good   Attitude / Demeanor: Cooperative   Speech      Rate / Production: Normal/ Responsive      Volume:  Normal  volume  Orientation:  All  Mood:   Normal  Affect:   Appropriate   Thought Content: Clear   Insight:   Good       Plan/Need for Future Services:  Return for therapy in 2 weeks to treat diagnosed problems.    Patient has a current master individualized treatment plan.  See Epic treatment plan for more information.    Referral / Collaboration:  Referral to another professional/service is not indicated at this time..  Emergency Services Needed?  No    Assignment:  none    Interactive Complexity:  There are four specific communication difficulties that complicate the work of the primary psychiatric procedure.  Interactive complexity (+76297) may be reported when at least one of these difficulties  is present.    Communication difficulties present during current the psychiatric procedure include:  None.      Signature/Title:    Tenisha Stewart PsyD

## 2025-03-03 NOTE — NURSING NOTE
Is the patient currently in the state of [place]? YES    Current patient location: [address] [place] [address]    Visit mode:Video    If the visit is dropped, the patient can be reconnected by: VIDEO VISIT:  Send e-mail to at [email or web address]    Will anyone else be joining the visit? No  (If patient encounters technical issues they should call [phone])    Are changes needed to the allergy or medication list? N/A    Are refills needed on medications prescribed by this physician? No    Rooming Documentation: Questionnaire(s) not done per department protocol.    Reason for visit: RECHANGELINA James

## 2025-03-10 ENCOUNTER — VIRTUAL VISIT (OUTPATIENT)
Dept: PSYCHOLOGY | Facility: CLINIC | Age: 30
End: 2025-03-10
Payer: COMMERCIAL

## 2025-03-10 ENCOUNTER — VIRTUAL VISIT (OUTPATIENT)
Dept: PSYCHIATRY | Facility: CLINIC | Age: 30
End: 2025-03-10
Payer: COMMERCIAL

## 2025-03-10 ENCOUNTER — OFFICE VISIT (OUTPATIENT)
Dept: PSYCHOLOGY | Facility: CLINIC | Age: 30
End: 2025-03-10
Payer: COMMERCIAL

## 2025-03-10 VITALS — HEIGHT: 67 IN | WEIGHT: 135 LBS | BODY MASS INDEX: 21.19 KG/M2

## 2025-03-10 DIAGNOSIS — F64.0 GENDER DYSPHORIA IN ADULT: Primary | ICD-10-CM

## 2025-03-10 DIAGNOSIS — F43.10 PTSD (POST-TRAUMATIC STRESS DISORDER): ICD-10-CM

## 2025-03-10 DIAGNOSIS — F33.3 MAJOR DEPRESSIVE DISORDER, RECURRENT, SEVERE WITH PSYCHOTIC FEATURES (H): ICD-10-CM

## 2025-03-10 DIAGNOSIS — F64.0 GENDER DYSPHORIA IN ADULT: ICD-10-CM

## 2025-03-10 DIAGNOSIS — F33.3 MAJOR DEPRESSIVE DISORDER, RECURRENT, SEVERE WITH PSYCHOTIC FEATURES (H): Primary | ICD-10-CM

## 2025-03-10 DIAGNOSIS — F41.1 GENERALIZED ANXIETY DISORDER: Primary | ICD-10-CM

## 2025-03-10 DIAGNOSIS — F51.04 PSYCHOPHYSIOLOGICAL INSOMNIA: ICD-10-CM

## 2025-03-10 DIAGNOSIS — F90.9 ATTENTION DEFICIT HYPERACTIVITY DISORDER (ADHD), UNSPECIFIED ADHD TYPE: ICD-10-CM

## 2025-03-10 ASSESSMENT — PAIN SCALES - GENERAL: PAINLEVEL_OUTOF10: NO PAIN (0)

## 2025-03-10 NOTE — PROGRESS NOTES
[facility] - Progress Note    Date of Service: 3/10/25   Name: Kd Slater  : 1995  Medical Record Number: [id number]  Treating Provider: Tenisha Stewart PsyD  Type of Session: Individual  Present in Session: Client  Session Start and Stop Time: :55  Number of Minutes: 55    SERVICE MODALITY:  Video Visit:      Provider verified identity through the following two step process.  Patient provided:  Patient was verified at admission/transfer    Telemedicine Visit: The patient's condition can be safely assessed and treated via synchronous audio and visual telemedicine encounter.      Reason for Telemedicine Visit: Patient convenience (e.g. access to timely appointments / distance to available provider)    Originating Site (Patient Location): Patient's home    Distant Site (Provider Location): [facility]    Consent:  The patient/guardian has verbally consented to: the potential risks and benefits of telemedicine (video visit) versus in person care; bill my insurance or make self-payment for services provided; and responsibility for payment of non-covered services.     Patient would like the video invitation sent by:  My Chart    Mode of Communication:  Video Conference via [facility]    Distant Location (Provider):  On-site    As the provider I attest to compliance with applicable laws and regulations related to telemedicine.    DSM-5 Diagnoses:  Encounter Diagnoses   Name Primary?    Gender dysphoria in adult     Major depressive disorder, recurrent, severe with psychotic features (H) Yes     Current Reported Symptoms and Status update:  Changes since last session- low mood, feeling down, hopeless, shame/guilt, worthlessness congruent with long standing depression. Client reports dysphoria continues to be high. Reports distress related to uncertainty in romantic life.       Progress Toward Treatment Goals:   Minimal progress     Therapeutic  Interventions/Treatment Strategies:    Area(s) of treatment focus addressed in this session included Symptom Management, Interpersonal Relationship Skills, and Sexual Health and Wellness    Psychotherapist offered support, feedback and validation, provided redirection, and reinforced use of skills Treatment modalities used include Narrative Therapy Systemic Relational Therapy Interpersonal Symptoms Management: Promoted understanding of their diagnoses and how it impacts their functioning, Other: Assisted patient  in finding ways to adapt functioning in light of past traumatic experiences and Discussed the benefits of concept of forgiveness as relates to mental health , and Relationship Skills: Assisted patients in implementing more effective communication skills in their relationships, Encouraged development and maintenance  of healthy boundaries, and Discussed strategies to promote healthier understanding of interpersonal relationships  Support, Redirection, and Feedback    Patient Response:   Patient responded to session by verbalizing understanding and actively engaged  Possible barriers to participation / learning include: N/A    Current Mental Status Exam:   Appearance:  Appropriate   Eye Contact:  Good   Attitude / Demeanor: Cooperative   Speech      Rate / Production: Normal/ Responsive      Volume:  Normal  volume  Orientation:  All  Mood:   Normal  Affect:   Appropriate   Thought Content: Clear   Insight:   Good       Plan/Need for Future Services:  Return for therapy in 2 weeks to treat diagnosed problems.    Patient has a current master individualized treatment plan.  See Epic treatment plan for more information.    Referral / Collaboration:  Referral to another professional/service is not indicated at this time..  Emergency Services Needed?  No    Assignment:  None    Interactive Complexity:  There are four specific communication difficulties that complicate the work of the primary psychiatric procedure.   Interactive complexity (+09858) may be reported when at least one of these difficulties is present.    Communication difficulties present during current the psychiatric procedure include:  None.      Signature/Title:    Tenisha Stewart PsyD

## 2025-03-10 NOTE — PROGRESS NOTES
"[facility]    Date of Service: 3/10/25      Name: dK Reyes)  : 1995  Medical Record Number: [id number]  Treating Provider: Candi Mera, PhD   Type of Session: group  Present in Session: Client with other group members  Type of Session: Group  Number of Minutes: 120 mins   Therapist(s): Candi Mera, PhD, LP     Video start time: 6:30 PM  Video end time: 8:30 PM     SERVICE MODALITY:  In Person      DSM-5 Diagnoses:  F64.0 - Gender Dysphoria in Adolescent and Adult   296.33 Recurrent Major depression      Current Reported Symptoms and Status update:  Experiences gender dysphoria;  ups and down with mood and self-perception, experiences paranoia and struggles in interpersonal relationships.      Changes since last session: now at [facility] instead of [facility]; is now on medications so less paranoia; feeling more comfortable in own skin.    Progress Toward Treatment Goals:   Satisfactory      Therapeutic Interventions/Treatment Strategies:     Area(s) of treatment focus addressed in this session included Symptom Management, Interpersonal Relationship Skills, Gender Health and Wellness       Cognitive behavioral, interpersonal, and group therapy interventions were utilized to explore gender dysphoria (i.e., transition, interpersonal relationships, and medical interventions) and mental health symptoms, specifically depression and anxiety. Dav did not request time but shared that she is feeling much better across many areas of life.  Gave self permission to \"just be a faggot\" which has allowed a sense of freedom in terms of gender expression.  Reports that medications are really helping with mental health symptoms including paranoia.    Treatment modalities used include [facility] INTERVENTIONS: Motivational Interviewing Group Dynamic Therapy  Behavioral Activation: Explored how behaviors effect mood and interact with thoughts and feelings;  Promoting gender health " and resiliency.      Patient Response:   Patient responded to session by listening and actively engaged.      Possible barriers to participation / learning include: some self-described paranoia and psychosis symptoms     Current Mental Status Exam:   Appearance:  Appropriate   Eye Contact:  Good   Attitude / Demeanor: Engaged   Speech      Rate / Production: Normal/ Responsive      Volume:  Normal  volume  Orientation:  All  Mood:   Euthymic  Affect:   Congruent  Thought Content: Clear   Insight:   Good      Plan/Need for Future Services:  Return for group therapy in 2 weeks to treat diagnosed problems.    Patient has a current master individualized treatment plan.  See Epic treatment plan for more information.     Referral / Collaboration:  Referral to another professional/service is not indicated at this time.  Emergency Services Needed?  No     Assignment:  none     Interactive Complexity:  There are four specific communication difficulties that complicate the work of the primary psychiatric procedure.  Interactive complexity (+41505) may be reported when at least one of these difficulties is present.     Communication difficulties present during current the psychiatric procedure include:  None.       Candi Mera, PhD  Licensed Psychologist

## 2025-03-10 NOTE — NURSING NOTE
Current patient location: [address] [place] [id number]    Is the patient currently in the state of [place]? YES    Visit mode: VIDEO    If the visit is dropped, the patient can be reconnected by:VIDEO VISIT: Text to cell phone:   Telephone Information:   Mobile [phone]    and VIDEO VISIT: Send to e-mail at: [email or web address]    Will anyone else be joining the visit? NO  (If patient encounters technical issues they should call [phone]    Are changes needed to the allergy or medication list? No    Are refills needed on medications prescribed by this physician? NO    Rooming Documentation:  Not applicable    Reason for visit: RECHECK    Demi ALFARO

## 2025-03-10 NOTE — NURSING NOTE
Current patient location: [address] [place] [address]    Is the patient currently in the state of [place]? YES    Visit mode: VIDEO    If the visit is dropped, the patient can be reconnected by:VIDEO VISIT: Send to e-mail at: [email or web address]    Will anyone else be joining the visit? NO  (If patient encounters technical issues they should call [phone]    Are changes needed to the allergy or medication list? No    Are refills needed on medications prescribed by this physician? NO    Rooming Documentation:  Not applicable    Reason for visit: DIEGO ALFARO
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
## 2025-03-17 ENCOUNTER — VIRTUAL VISIT (OUTPATIENT)
Dept: PSYCHOLOGY | Facility: CLINIC | Age: 30
End: 2025-03-17
Payer: COMMERCIAL

## 2025-03-17 DIAGNOSIS — F33.3 MAJOR DEPRESSIVE DISORDER, RECURRENT, SEVERE WITH PSYCHOTIC FEATURES (H): ICD-10-CM

## 2025-03-17 DIAGNOSIS — F64.0 GENDER DYSPHORIA IN ADULT: Primary | ICD-10-CM

## 2025-03-17 PROCEDURE — 90837 PSYTX W PT 60 MINUTES: CPT | Mod: 95 | Performed by: MARRIAGE & FAMILY THERAPIST

## 2025-03-17 NOTE — PROGRESS NOTES
[facility] - Progress Note    Date of Service: 3/17/25   Name: Kd Salter  : 1995  Medical Record Number: [id number]  Treating Provider: Tenisha Stewart PsyD  Type of Session: Individual  Present in Session: Client  Session Start and Stop Time: 9:02-9:58  Number of Minutes: 56    SERVICE MODALITY:  Video Visit:      Provider verified identity through the following two step process.  Patient provided:  Patient was verified at admission/transfer    Telemedicine Visit: The patient's condition can be safely assessed and treated via synchronous audio and visual telemedicine encounter.      Reason for Telemedicine Visit: Patient convenience (e.g. access to timely appointments / distance to available provider)    Originating Site (Patient Location): Patient's home    Distant Site (Provider Location): [facility]    Consent:  The patient/guardian has verbally consented to: the potential risks and benefits of telemedicine (video visit) versus in person care; bill my insurance or make self-payment for services provided; and responsibility for payment of non-covered services.     Patient would like the video invitation sent by:  My Chart    Mode of Communication:  Video Conference via [facility]    Distant Location (Provider):  On-site    As the provider I attest to compliance with applicable laws and regulations related to telemedicine.    DSM-5 Diagnoses:  Encounter Diagnoses   Name Primary?    Gender dysphoria in adult Yes    Major depressive disorder, recurrent, severe with psychotic features (H)      Current Reported Symptoms and Status update:  Changes since last session- client reports decreased dysphoria, anxiety, reduction in paranoia, disorganized thoughts. Continued questioning about   Progress Toward Treatment Goals:   Minimal progress     Therapeutic Interventions/Treatment Strategies:    Area(s) of treatment focus addressed in this session  included Symptom Management, Interpersonal Relationship Skills, and Sexual Health and Wellness    Psychotherapist offered support, feedback and validation, provided redirection, and reinforced use of skills Treatment modalities used include Narrative Therapy Gender Affirming Care Interpersonal Symptoms Management: Promoted understanding of their diagnoses and how it impacts their functioning, Relationship Skills: Assisted patients in implementing more effective communication skills in their relationships and Encouraged development and maintenance  of healthy boundaries, and discussed gender literacy and explored challenging unrealistic expectations of self/others  Support, Redirection, and Feedback    Patient Response:   Patient responded to session by verbalizing understanding and actively engaged  Possible barriers to participation / learning include: N/A    Current Mental Status Exam:   Appearance:  Appropriate   Eye Contact:  Good   Attitude / Demeanor: Cooperative   Speech      Rate / Production: Normal/ Responsive      Volume:  Normal  volume  Orientation:  All  Mood:   Anxious   Affect:   Appropriate   Thought Content: Clear   Insight:   Good       Plan/Need for Future Services:  Return for therapy in 2 weeks to treat diagnosed problems.    Patient has a current master individualized treatment plan.  See Epic treatment plan for more information.    Referral / Collaboration:  Referral to another professional/service is not indicated at this time..  Emergency Services Needed?  No    Assignment:  None    Interactive Complexity:  There are four specific communication difficulties that complicate the work of the primary psychiatric procedure.  Interactive complexity (+37390) may be reported when at least one of these difficulties is present.    Communication difficulties present during current the psychiatric procedure include:  None.      Signature/Title:    Tenisha Stewart PsyD

## 2025-03-17 NOTE — NURSING NOTE
Current patient location: [address] [place] [id number]    Is the patient currently in the state of [place]? YES    Visit mode: VIDEO    If the visit is dropped, the patient can be reconnected by:VIDEO VISIT: Text to cell phone:   Telephone Information:   Mobile [phone]       Will anyone else be joining the visit? NO  (If patient encounters technical issues they should call [phone]    Are changes needed to the allergy or medication list? N/A    Are refills needed on medications prescribed by this physician? NO    Rooming Documentation:  Not applicable    Reason for visit: DIEGO ALFARO

## 2025-03-24 ENCOUNTER — OFFICE VISIT (OUTPATIENT)
Dept: PSYCHOLOGY | Facility: CLINIC | Age: 30
End: 2025-03-24
Payer: COMMERCIAL

## 2025-03-24 DIAGNOSIS — F64.0 GENDER DYSPHORIA IN ADULT: Primary | ICD-10-CM

## 2025-03-24 DIAGNOSIS — F33.3 MAJOR DEPRESSIVE DISORDER, RECURRENT, SEVERE WITH PSYCHOTIC FEATURES (H): ICD-10-CM

## 2025-03-24 NOTE — PROGRESS NOTES
"[facility] - Progress Note    Date of Service: 3/24/25      Name: Kd Reyes)  : 1995  Medical Record Number: [id number]  Treating Provider: Candi Mera, PhD   Type of Session: group  Present in Session: Client with other group members  Type of Session: Group  Number of Minutes: 120 mins   Therapist(s): Candi Mera, PhD, LP     Video start time: 6:30 PM  Video end time: 8:30 PM     SERVICE MODALITY:  In Person      DSM-5 Diagnoses:  F64.0 - Gender Dysphoria in Adolescent and Adult   296.33 Recurrent Major depression      Current Reported Symptoms and Status update:  Experiences gender dysphoria;  ups and down with mood and self-perception, experiences paranoia and struggles in interpersonal relationships.      Changes since last session: tried out \"Jef\" but quickly switched back to Dav; is playing with different gender presentations    Progress Toward Treatment Goals:   Satisfactory      Therapeutic Interventions/Treatment Strategies:     Area(s) of treatment focus addressed in this session included Symptom Management, Interpersonal Relationship Skills, Gender Health and Wellness       Cognitive behavioral, interpersonal, and group therapy interventions were utilized to explore gender dysphoria (i.e., transition, interpersonal relationships, and medical interventions) and mental health symptoms, specifically depression and anxiety. Dav shared that they are exploring nonbinary identity     Medication is working including no longer experiencing \"fog,\" is more present, and also can shift out of negative mood states easier, and is not experiencing psychosis anymore.  Does have negative side effect of sleepiness.   Also reports that new job at [employer] is going well -- \"I kind of enjoy the work.\"  Shared in the goodbye process of a long-standing group member.     [facility] THERAPY INTERVENTIONS: Motivational Interviewing Group Dynamic Therapy  Behavioral Activation: Explored how " behaviors effect mood and interact with thoughts and feelings;  Promoting gender health and resiliency.      Patient Response:   Patient responded to session by listening and actively engaged.      Possible barriers to participation / learning include: long-term mental health concerns     Current Mental Status Exam:   Appearance:  Appropriate   Eye Contact:  Good   Attitude / Demeanor: Engaged   Speech      Rate / Production: Normal/ Responsive      Volume:  Normal  volume  Orientation:  All  Mood:   Euthymic  Affect:   Congruent  Thought Content: Clear   Insight:   Good      Plan/Need for Future Services:  Return for group therapy in 2 weeks to treat diagnosed problems.    Patient has a current master individualized treatment plan.  See Epic treatment plan for more information.     Referral / Collaboration:  Referral to another professional/service is not indicated at this time.  Emergency Services Needed?  No     Assignment:  none     Interactive Complexity:  There are four specific communication difficulties that complicate the work of the primary psychiatric procedure.  Interactive complexity (+53579) may be reported when at least one of these difficulties is present.     Communication difficulties present during current the psychiatric procedure include:  None.       Candi Mera, PhD  Licensed Psychologist

## 2025-03-31 ENCOUNTER — VIRTUAL VISIT (OUTPATIENT)
Dept: PSYCHOLOGY | Facility: CLINIC | Age: 30
End: 2025-03-31
Payer: COMMERCIAL

## 2025-03-31 DIAGNOSIS — F33.3 MAJOR DEPRESSIVE DISORDER, RECURRENT, SEVERE WITH PSYCHOTIC FEATURES (H): ICD-10-CM

## 2025-03-31 DIAGNOSIS — F41.1 GENERALIZED ANXIETY DISORDER: ICD-10-CM

## 2025-03-31 DIAGNOSIS — F64.0 GENDER DYSPHORIA IN ADULT: Primary | ICD-10-CM

## 2025-03-31 ASSESSMENT — PATIENT HEALTH QUESTIONNAIRE - PHQ9
10. IF YOU CHECKED OFF ANY PROBLEMS, HOW DIFFICULT HAVE THESE PROBLEMS MADE IT FOR YOU TO DO YOUR WORK, TAKE CARE OF THINGS AT HOME, OR GET ALONG WITH OTHER PEOPLE: SOMEWHAT DIFFICULT
SUM OF ALL RESPONSES TO PHQ QUESTIONS 1-9: 9
SUM OF ALL RESPONSES TO PHQ QUESTIONS 1-9: 9

## 2025-03-31 NOTE — PROGRESS NOTES
[facility] - Progress Note    Date of Service: 3/31/25   Name: Kd Slater  : 1995  Medical Record Number: [id number]  Treating Provider: Tenisha Stewart PsyD  Type of Session: Individual  Present in Session: Client  Session Start and Stop Time: 9:05-10  Number of Minutes:  55    SERVICE MODALITY:  Video Visit:      Provider verified identity through the following two step process.  Patient provided:  Patient was verified at admission/transfer    Telemedicine Visit: The patient's condition can be safely assessed and treated via synchronous audio and visual telemedicine encounter.      Reason for Telemedicine Visit: Patient convenience (e.g. access to timely appointments / distance to available provider)    Originating Site (Patient Location): Patient's home    Distant Site (Provider Location): [facility]    Consent:  The patient/guardian has verbally consented to: the potential risks and benefits of telemedicine (video visit) versus in person care; bill my insurance or make self-payment for services provided; and responsibility for payment of non-covered services.     Patient would like the video invitation sent by:  My Chart    Mode of Communication:  Video Conference via [facility]    Distant Location (Provider):  On-site    As the provider I attest to compliance with applicable laws and regulations related to telemedicine.    DSM-5 Diagnoses:  Encounter Diagnoses   Name Primary?    Gender dysphoria in adult Yes    Major depressive disorder, recurrent, severe with psychotic features (H)     Generalized anxiety disorder      Current Reported Symptoms and Status update:  Changes since last session increased anxiety, gender questioning, questioning sexual orientation.     Progress Toward Treatment Goals:   Satisfactory progress     Therapeutic Interventions/Treatment Strategies:    Area(s) of treatment focus addressed in this session included  Symptom Management, Interpersonal Relationship Skills, Sexual Health and Wellness, and Gender Health    Psychotherapist offered support, feedback and validation, set limits, and provided redirection Treatment modalities used include Gender Affirming Care Interpersonal Humanist Cognitive Restructuring:  Assisted patient in formulating new neutral/positive alternatives to challenge less helpful / ineffective thoughts, Other: Explored with patient how life transitions may impact mental health and functioning , and discussed application of self compassion and discussed gender literacy  Support, Redirection, and Feedback    Patient Response:   Patient responded to session by verbalizing understanding and actively engaged  Possible barriers to participation / learning include: N/A    Current Mental Status Exam:   Appearance:  Appropriate   Eye Contact:  Fair   Attitude / Demeanor: Cooperative   Speech      Rate / Production: Normal/ Responsive      Volume:  Normal  volume  Orientation:  All  Mood:   Normal  Affect:   Appropriate   Thought Content: Clear   Insight:   Good       Plan/Need for Future Services:  Return for therapy in 1-2 weeks to treat diagnosed problems.    Patient has a current master individualized treatment plan.  See Epic treatment plan for more information.    Referral / Collaboration:  Referral to another professional/service is not indicated at this time..  Emergency Services Needed?  No    Assignment:  None    Interactive Complexity:  There are four specific communication difficulties that complicate the work of the primary psychiatric procedure.  Interactive complexity (+57155) may be reported when at least one of these difficulties is present.    Communication difficulties present during current the psychiatric procedure include:  None.      Signature/Title:    Tenisha Stewart PsyD       Answers submitted by the patient for this visit:  Patient Health Questionnaire (Submitted on 3/31/2025)  If you  checked off any problems, how difficult have these problems made it for you to do your work, take care of things at home, or get along with other people?: Somewhat difficult  PHQ9 TOTAL SCORE: 9

## 2025-03-31 NOTE — NURSING NOTE
Is the patient currently in the state of [place]? YES    Current patient location: [address] [place] [address]    Visit mode:Video    If the visit is dropped, the patient can be reconnected by: VIDEO VISIT:  Send e-mail to at [email or web address]    Will anyone else be joining the visit? No  (If patient encounters technical issues they should call [phone])    Are changes needed to the allergy or medication list? N/A    Are refills needed on medications prescribed by this physician? No    Rooming Documentation: Questionnaire(s) not done per department protocol.    Reason for visit: RECHANGELINA James

## 2025-04-10 DIAGNOSIS — F33.3 MAJOR DEPRESSIVE DISORDER, RECURRENT, SEVERE WITH PSYCHOTIC FEATURES (H): ICD-10-CM

## 2025-04-10 NOTE — TELEPHONE ENCOUNTER
90 Day Prescription Request  Order has not been updated    Last seen: 3/10/2025  RTC: 1 mo  Any patient initiated cancellations or no shows since last visit? No  Next appt:   Last filled: 2/27/2025     Incoming refill from pharmacy via fax by pharmacy    Medication requested: Aripiprazole 15 MG tablet    From chart note: 1) Medications:      CONTINUE:     OLANZapine (ZYPREXA) 5 MG tablet, Take 1 - 2 tablets (5mg - 10mg) as needed twice daily., Disp: 30 tablet, Rfl: 0    ARIPiprazole (ABILIFY) 15 MG tablet, Take 1 tablet (15 mg) by mouth daily., Disp: 30 tablet, Rfl: 0    escitalopram (LEXAPRO) 5 MG tablet, Take 1 tablet (5 mg) by mouth daily., Disp: 30 tablet, Rfl: 1        2) Psychotherapy: continue     3) Next due:  Labs- Routine monitoring is not indicated for current psychotropic medication regimen   EKG- Routine monitoring is not indicated for current psychotropic medication regimen   Rating scales- none needed     4) Referrals: none     5) Other: none     6) Follow-up: Return to clinic in 4 weeks.     Is note signed/closed? Yes    Is this a 90 day request for a psych medication? No    LPNs - Please check  if controlled and send to provider  Others - Send to RNs

## 2025-04-14 ENCOUNTER — VIRTUAL VISIT (OUTPATIENT)
Dept: PSYCHOLOGY | Facility: CLINIC | Age: 30
End: 2025-04-14
Payer: COMMERCIAL

## 2025-04-14 ENCOUNTER — OFFICE VISIT (OUTPATIENT)
Dept: PSYCHOLOGY | Facility: CLINIC | Age: 30
End: 2025-04-14
Payer: COMMERCIAL

## 2025-04-14 DIAGNOSIS — F64.0 GENDER DYSPHORIA IN ADULT: Primary | ICD-10-CM

## 2025-04-14 DIAGNOSIS — F33.3 MAJOR DEPRESSIVE DISORDER, RECURRENT, SEVERE WITH PSYCHOTIC FEATURES (H): ICD-10-CM

## 2025-04-14 DIAGNOSIS — F64.0 GENDER DYSPHORIA IN ADULT: ICD-10-CM

## 2025-04-14 DIAGNOSIS — F41.1 GENERALIZED ANXIETY DISORDER: Primary | ICD-10-CM

## 2025-04-14 RX ORDER — ARIPIPRAZOLE 15 MG/1
15 TABLET ORAL DAILY
Qty: 90 TABLET | Refills: 0 | Status: SHIPPED | OUTPATIENT
Start: 2025-04-14

## 2025-04-14 NOTE — NURSING NOTE
Is the patient currently in the state of [place]? YES    Current patient location: [address] [place] [address]    Visit mode:Video    If the visit is dropped, the patient can be reconnected by: VIDEO VISIT:  Send e-mail to at [email or web address]    Will anyone else be joining the visit? No  (If patient encounters technical issues they should call [phone])    Are changes needed to the allergy or medication list? N/A    Are refills needed on medications prescribed by this physician? No    Rooming Documentation: Questionnaire(s) not done per department protocol.    Reason for visit: RECHANGELINA James

## 2025-04-14 NOTE — PROGRESS NOTES
[facility] - Progress Note    Date of Service: 25   Name: Kd Slater  : 1995  Medical Record Number: [id number]  Treating Provider: Tenisha Stewart PsyD  Type of Session: Individual  Present in Session: Client  Session Start and Stop Time: 9:07-10:00  Number of Minutes:  53    SERVICE MODALITY:  Video Visit:      Provider verified identity through the following two step process.  Patient provided:  Patient was verified at admission/transfer    Telemedicine Visit: The patient's condition can be safely assessed and treated via synchronous audio and visual telemedicine encounter.      Reason for Telemedicine Visit: Patient convenience (e.g. access to timely appointments / distance to available provider)    Originating Site (Patient Location): Patient's home    Distant Site (Provider Location): [facility]    Consent:  The patient/guardian has verbally consented to: the potential risks and benefits of telemedicine (video visit) versus in person care; bill my insurance or make self-payment for services provided; and responsibility for payment of non-covered services.     Patient would like the video invitation sent by:  My Chart    Mode of Communication:  Video Conference via [facility]    Distant Location (Provider):  On-site    As the provider I attest to compliance with applicable laws and regulations related to telemedicine.    DSM-5 Diagnoses:  Encounter Diagnoses   Name Primary?    Gender dysphoria in adult     Generalized anxiety disorder Yes    Major depressive disorder, recurrent, severe with psychotic features (H)        Current Reported Symptoms and Status update:  Changes since last session- increased social anxiety, less rumination about gender identity/sexual orientation. Low mood, paranoia, dissociation continue.     Progress Toward Treatment Goals:   Minimal progress     Therapeutic Interventions/Treatment Strategies:    Area(s) of  treatment focus addressed in this session included Symptom Management, Personal Safety/Harm Reduction, and Gender Health  Explored use of cannabis as it contributes to anxiety, paranoia, social anxiety. Explored harm reduction and hesitation to quit.    Psychotherapist offered support, feedback and validation, provided redirection, and reinforced use of skills Treatment modalities used include Motivational Interviewing Gender Affirming Care Interpersonal Behavioral Activation: Encouraged strategies to reduce individual procrastination and increase motivation by increasing goal-directed activities to enhance mood and reduce symptoms., Relapse Prevention: Facilitated understanding of effective coping skills in response to triggers for substance use and Discussed the use of substances and its impact on their relationships, and Symptoms Management: Promoted understanding of their diagnoses and how it impacts their functioning  Support, Feedback, and Motivational Enhancement Therapy    Patient Response:   Patient responded to session by verbalizing understanding and actively engaged  Possible barriers to participation / learning include: N/A    Current Mental Status Exam:   Appearance:  Appropriate   Eye Contact:  Fair   Attitude / Demeanor: Cooperative   Speech      Rate / Production: Normal/ Responsive      Volume:  Normal  volume  Orientation:  All  Mood:   Normal  Affect:   Appropriate   Thought Content: Clear   Insight:   Good       Plan/Need for Future Services:  Return for therapy in 1-2 weeks to treat diagnosed problems.    Patient has a current master individualized treatment plan.  See Epic treatment plan for more information.    Referral / Collaboration:  Referral to another professional/service is not indicated at this time..  Emergency Services Needed?  No    Assignment:  Titrate off cannabis, utilize support system, follow up with Samir    Interactive Complexity:  There are four specific communication  difficulties that complicate the work of the primary psychiatric procedure.  Interactive complexity (+74117) may be reported when at least one of these difficulties is present.    Communication difficulties present during current the psychiatric procedure include:  None.      Signature/Title:    Tenisha Stewart PsyD

## 2025-04-14 NOTE — TELEPHONE ENCOUNTER
Order changes to 90 day supply per pharmacy request.     Medication unable to be refilled by RN due to criteria not met as indicated.                 []Eligibility - not seen in the last year              []Supervision - no future appointment              []Compliance - no shows, cancellations or lapse in therapy              []Verification - order discrepancy              []Controlled medication              []Medication not included in policy              [x]90-day supply request              []Other:

## 2025-04-15 NOTE — PROGRESS NOTES
"[facility] - Progress [facility]    Date of Service: 25      Name: Kd Reyes)  : 1995  Medical Record Number: [id number]  Treating Provider: Candi Mera, PhD   Type of Session: group  Present in Session: Client with other group members  Type of Session: Group  Number of Minutes: 120 mins   Therapist(s): Candi Mera, PhD, LP     Video start time: 6:30 PM  Video end time: 8:30 PM     SERVICE MODALITY:  In Person      DSM-5 Diagnoses:  F64.0 - Gender Dysphoria in Adolescent and Adult   296.33 Recurrent Major depression      Current Reported Symptoms and Status update:  Experiences gender dysphoria;  ups and down with mood and self-perception, experiences paranoia and struggles in interpersonal relationships.      Changes since last session: job is going well; is working on how she \"shows up in the world instead of overfocusing on identity\";  would like to \"stop smoking weed\" due to its impact on her psychosis    Progress Toward Treatment Goals:   Satisfactory      Therapeutic Interventions/Treatment Strategies:     Area(s) of treatment focus addressed in this session included Symptom Management, Interpersonal Relationship Skills, Gender Health and Wellness       Cognitive behavioral, interpersonal, and group therapy interventions were utilized to explore gender dysphoria (i.e., transition, interpersonal relationships, and medical interventions) and mental health symptoms, specifically depression and anxiety.  Did not request time but provided lots of support and resonance with what other group members shared.     [facility] THERAPY INTERVENTIONS: Motivational Interviewing Group Dynamic Therapy  Behavioral Activation: Explored how behaviors effect mood and interact with thoughts and feelings;  Promoting gender health and resiliency.      Patient Response:   Patient responded to session by listening and actively engaged.      Possible barriers to participation / learning include: " long-term mental health concerns     Current Mental Status Exam:   Appearance:  Appropriate   Eye Contact:  Good   Attitude / Demeanor: Engaged   Speech      Rate / Production: Normal/ Responsive      Volume:  Normal  volume  Orientation:  All  Mood:   Euthymic  Affect:   Congruent  Thought Content: Clear   Insight:   Good      Plan/Need for Future Services:  Return for group therapy in 2 weeks to treat diagnosed problems.    Patient has a current master individualized treatment plan.  See Epic treatment plan for more information.     Referral / Collaboration:  Referral to another professional/service is not indicated at this time.  Emergency Services Needed?  No     Assignment:  none     Interactive Complexity:  There are four specific communication difficulties that complicate the work of the primary psychiatric procedure.  Interactive complexity (+73414) may be reported when at least one of these difficulties is present.     Communication difficulties present during current the psychiatric procedure include:  None.       Candi Mera, PhD  Licensed Psychologist

## 2025-04-28 ENCOUNTER — VIRTUAL VISIT (OUTPATIENT)
Dept: PSYCHOLOGY | Facility: CLINIC | Age: 30
End: 2025-04-28
Payer: COMMERCIAL

## 2025-04-28 DIAGNOSIS — F33.3 MAJOR DEPRESSIVE DISORDER, RECURRENT, SEVERE WITH PSYCHOTIC FEATURES (H): ICD-10-CM

## 2025-04-28 DIAGNOSIS — F41.1 GENERALIZED ANXIETY DISORDER: ICD-10-CM

## 2025-04-28 DIAGNOSIS — F64.0 GENDER DYSPHORIA IN ADULT: Primary | ICD-10-CM

## 2025-04-28 PROCEDURE — 90834 PSYTX W PT 45 MINUTES: CPT | Mod: 95 | Performed by: MARRIAGE & FAMILY THERAPIST

## 2025-04-28 NOTE — PROGRESS NOTES
[facility] - Progress Note    Date of Service: 25   Name: Kd Slater  : 1995  Medical Record Number: [id number]  Treating Provider: Tenisha Stewart PsyD  Type of Session: Individual  Present in Session: Client  Session Start and Stop Time: 9:10-10  Number of Minutes:  50    SERVICE MODALITY:  Video Visit:      Provider verified identity through the following two step process.  Patient provided:  Patient was verified at admission/transfer    Telemedicine Visit: The patient's condition can be safely assessed and treated via synchronous audio and visual telemedicine encounter.      Reason for Telemedicine Visit: Patient convenience (e.g. access to timely appointments / distance to available provider)    Originating Site (Patient Location): Patient's home    Distant Site (Provider Location): [facility]    Consent:  The patient/guardian has verbally consented to: the potential risks and benefits of telemedicine (video visit) versus in person care; bill my insurance or make self-payment for services provided; and responsibility for payment of non-covered services.     Patient would like the video invitation sent by:  My Chart    Mode of Communication:  Video Conference via [facility]    Distant Location (Provider):  On-site    As the provider I attest to compliance with applicable laws and regulations related to telemedicine.    DSM-5 Diagnoses:  Encounter Diagnoses   Name Primary?    Gender dysphoria in adult Yes    Generalized anxiety disorder     Major depressive disorder, recurrent, severe with psychotic features (H)      Current Reported Symptoms and Status update:  Changes since last session- less anxiety, reconnection with self, continued questioning around gender, presentation.     Progress Toward Treatment Goals:   Satisfactory progress     Therapeutic Interventions/Treatment Strategies:    Area(s) of treatment focus addressed in this  session included Symptom Management, Interpersonal Relationship Skills, and Gender Health    Psychotherapist offered support, feedback and validation, provided redirection, and reinforced use of skills Treatment modalities used include Narrative Therapy Gender Affirming Care Interpersonal Symptoms Management: Promoted understanding of their diagnoses and how it impacts their functioning, Emotions Management:  Increased awareness of daily mood patterns/changes, and Relationship Skills: Assisted patients in implementing more effective communication skills in their relationships and Discussed strategies to promote healthier understanding of interpersonal relationships  Support and Feedback    Patient Response:   Patient responded to session by verbalizing understanding and actively engaged  Possible barriers to participation / learning include: N/A    Current Mental Status Exam:   Appearance:  Appropriate   Eye Contact:  Good   Attitude / Demeanor: Cooperative   Speech      Rate / Production: Normal/ Responsive      Volume:  Normal  volume  Orientation:  All  Mood:   Normal  Affect:   Appropriate   Thought Content: Clear   Insight:   Good       Plan/Need for Future Services:  Return for therapy in 1-2 weeks to treat diagnosed problems.    Patient has a current master individualized treatment plan.  See Epic treatment plan for more information.    Referral / Collaboration:  Referral to another professional/service is not indicated at this time..  Emergency Services Needed?  No    Assignment:  None    Interactive Complexity:  There are four specific communication difficulties that complicate the work of the primary psychiatric procedure.  Interactive complexity (+61510) may be reported when at least one of these difficulties is present.    Communication difficulties present during current the psychiatric procedure include:  None.      Signature/Title:    Tenisha Stewart PsyD

## 2025-04-28 NOTE — NURSING NOTE
Is the patient currently in the state of [place]? YES    Current patient location: [address] [place] [address]    Visit mode:Video    If the visit is dropped, the patient can be reconnected by: VIDEO VISIT:  Send e-mail to at [email or web address]    Will anyone else be joining the visit? No  (If patient encounters technical issues they should call [phone])    Are changes needed to the allergy or medication list? N/A    Are refills needed on medications prescribed by this physician? No    Rooming Documentation: Questionnaire(s) not done per department protocol.    Reason for visit: RECHECK     Justine Hobbs, MIKEF

## 2025-05-05 ENCOUNTER — VIRTUAL VISIT (OUTPATIENT)
Dept: PSYCHOLOGY | Facility: CLINIC | Age: 30
End: 2025-05-05
Payer: COMMERCIAL

## 2025-05-05 DIAGNOSIS — F41.1 GENERALIZED ANXIETY DISORDER: ICD-10-CM

## 2025-05-05 DIAGNOSIS — F33.3 MAJOR DEPRESSIVE DISORDER, RECURRENT, SEVERE WITH PSYCHOTIC FEATURES (H): ICD-10-CM

## 2025-05-05 DIAGNOSIS — F64.0 GENDER DYSPHORIA IN ADULT: Primary | ICD-10-CM

## 2025-05-05 NOTE — NURSING NOTE
Current patient location: [address] [place] [address]    Is the patient currently in the state of [place]? YES    Visit mode: VIDEO    If the visit is dropped, the patient can be reconnected by:VIDEO VISIT: Send to e-mail at: [email or web address]    Will anyone else be joining the visit? NO  (If patient encounters technical issues they should call [phone]    Are changes needed to the allergy or medication list? N/A    Are refills needed on medications prescribed by this physician? NO    Rooming Documentation:  Not applicable    Reason for visit: RECHECK    Leslie ALFARO

## 2025-05-05 NOTE — PROGRESS NOTES
[facility] - Progress Note    Date of Service: 25   Name: Kd Slater  : 1995  Medical Record Number: [id number]  Treating Provider: Tenisha Stewart PsyD  Type of Session: Individual  Present in Session: Client  Session Start and Stop Time: 8:12-9:00  Number of Minutes: 48    SERVICE MODALITY:  Video Visit:      Provider verified identity through the following two step process.  Patient provided:  Patient was verified at admission/transfer    Telemedicine Visit: The patient's condition can be safely assessed and treated via synchronous audio and visual telemedicine encounter.      Reason for Telemedicine Visit: Patient convenience (e.g. access to timely appointments / distance to available provider)    Originating Site (Patient Location): Patient's home    Distant Site (Provider Location): [facility]    Consent:  The patient/guardian has verbally consented to: the potential risks and benefits of telemedicine (video visit) versus in person care; bill my insurance or make self-payment for services provided; and responsibility for payment of non-covered services.     Patient would like the video invitation sent by:  My Chart    Mode of Communication:  Video Conference via [facility]    Distant Location (Provider):  On-site    As the provider I attest to compliance with applicable laws and regulations related to telemedicine.    DSM-5 Diagnoses:  Encounter Diagnoses   Name Primary?    Gender dysphoria in adult Yes    Generalized anxiety disorder     Major depressive disorder, recurrent, severe with psychotic features (H)      Current Reported Symptoms and Status update:  Changes since last session-increased social anxiety, less rumination about gender identity/sexual orientation. Low mood, paranoia, dissociation continue.        Progress Toward Treatment Goals:   Satisfactory progress     Therapeutic Interventions/Treatment Strategies:    Area(s)  of treatment focus addressed in this session included Symptom Management, Gender Health, and Develop / Improve Independent Living Skills    Psychotherapist offered support, feedback and validation, provided redirection, and reinforced use of skills Treatment modalities used include Narrative Therapy Gender Affirming Care Interpersonal Coping Skills: Addressed barriers to utilizing coping skills when in distress, Emotions Management:  Increased awareness of daily mood patterns/changes, and Relationship Skills: Discussed strategies to promote healthier understanding of interpersonal relationships  Support, Redirection, and Feedback    Patient Response:   Patient responded to session by verbalizing understanding and actively engaged  Possible barriers to participation / learning include: N/A    Current Mental Status Exam:   Appearance:  Appropriate   Eye Contact:  Good   Attitude / Demeanor: Cooperative   Speech      Rate / Production: Normal/ Responsive      Volume:  Normal  volume  Orientation:  All  Mood:   Normal  Affect:   Appropriate   Thought Content: Clear   Insight:   Good       Plan/Need for Future Services:  Return for therapy in 1-2 weeks to treat diagnosed problems.    Patient has a current master individualized treatment plan.  See Epic treatment plan for more information.    Referral / Collaboration:  Referral to another professional/service is not indicated at this time..  Emergency Services Needed?  No    Assignment:  None    Interactive Complexity:  There are four specific communication difficulties that complicate the work of the primary psychiatric procedure.  Interactive complexity (+87651) may be reported when at least one of these difficulties is present.    Communication difficulties present during current the psychiatric procedure include:  None.      Signature/Title:    Tenisha Stewart PsyD

## 2025-05-09 ENCOUNTER — VIRTUAL VISIT (OUTPATIENT)
Dept: PSYCHIATRY | Facility: CLINIC | Age: 30
End: 2025-05-09
Payer: COMMERCIAL

## 2025-05-09 VITALS — WEIGHT: 135 LBS | BODY MASS INDEX: 21.19 KG/M2 | HEIGHT: 67 IN

## 2025-05-09 DIAGNOSIS — F41.1 GENERALIZED ANXIETY DISORDER: ICD-10-CM

## 2025-05-09 DIAGNOSIS — F33.3 MAJOR DEPRESSIVE DISORDER, RECURRENT, SEVERE WITH PSYCHOTIC FEATURES (H): ICD-10-CM

## 2025-05-09 RX ORDER — ESCITALOPRAM OXALATE 10 MG/1
10 TABLET ORAL DAILY
Qty: 30 TABLET | Refills: 1 | Status: SHIPPED | OUTPATIENT
Start: 2025-05-09

## 2025-05-09 RX ORDER — OLANZAPINE 5 MG/1
TABLET, FILM COATED ORAL
Qty: 30 TABLET | Refills: 0 | Status: SHIPPED | OUTPATIENT
Start: 2025-05-09

## 2025-05-09 ASSESSMENT — PAIN SCALES - GENERAL: PAINLEVEL_OUTOF10: NO PAIN (0)

## 2025-05-09 NOTE — NURSING NOTE
Current patient location: Unknown address    Is the patient currently in the state of [place]? YES    Visit mode: VIDEO    If the visit is dropped, the patient can be reconnected by:VIDEO VISIT: Send to e-mail at: [email or web address]    Will anyone else be joining the visit? NO  (If patient encounters technical issues they should call [phone])    Are changes needed to the allergy or medication list? N/A    Are refills needed on medications prescribed by this physician? Discuss with provider    Rooming Documentation:  Not applicable    Reason for visit: DIEGO ALFARO
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
## 2025-05-12 ENCOUNTER — OFFICE VISIT (OUTPATIENT)
Dept: PSYCHOLOGY | Facility: CLINIC | Age: 30
End: 2025-05-12
Payer: COMMERCIAL

## 2025-05-12 DIAGNOSIS — F64.0 GENDER DYSPHORIA IN ADULT: Primary | ICD-10-CM

## 2025-05-12 DIAGNOSIS — F41.1 GENERALIZED ANXIETY DISORDER: ICD-10-CM

## 2025-05-12 DIAGNOSIS — F33.3 MAJOR DEPRESSIVE DISORDER, RECURRENT, SEVERE WITH PSYCHOTIC FEATURES (H): ICD-10-CM

## 2025-05-12 NOTE — PROGRESS NOTES
[place] [facility] - Progress Note    Date of Service: 25      Name: Kd Reyes)  : 1995  Medical Record Number: [id number]  Treating Provider: Candi Mera, PhD   Type of Session: group  Present in Session: Client with other group members  Type of Session: Group  Number of Minutes: 120 mins   Therapist(s): Candi Mera, PhD, LP     Video start time: 6:30 PM  Video end time: 8:30 PM    SERVICE MODALITY: In Person    DSM-5 Diagnoses:  F64.0 - Gender Dysphoria in Adolescent and Adult   296.33 Recurrent Major depression      Current Reported Symptoms and Status update:  Experiences gender dysphoria;  ups and down with mood and self-perception, experiences paranoia and struggles in interpersonal relationships.      Changes since last session: shaved head for gender reasons and feels good about it; is experiencing increased depression symptoms    Progress Toward Treatment Goals:   Satisfactory      Therapeutic Interventions/Treatment Strategies:     Area(s) of treatment focus addressed in this session included Symptom Management, Interpersonal Relationship Skills, Gender Health and Wellness       Cognitive behavioral, interpersonal, and group therapy interventions were utilized to explore gender dysphoria (i.e., transition, interpersonal relationships, and medical interventions) and mental health symptoms, specifically depression and anxiety.  Recognition that she is feeling depression and requested time to reflect on the repetitive patterns.  Shared information that indicated some mood cycling. Shared that she was open to hearing the perspective of the group because she trusts that the group has seen many sides of her. Took in the support from the group.     [facility] THERAPY INTERVENTIONS: Motivational Interviewing Group Dynamic Therapy  Behavioral Activation: Explored how behaviors effect mood and interact with thoughts and feelings;  Promoting gender health and resiliency.       Patient Response:   Patient responded to session by listening and actively engaged.      Possible barriers to participation / learning include: long-term mental health concerns     Current Mental Status Exam:   Appearance:  Appropriate   Eye Contact:  Good   Attitude / Demeanor: Engaged   Speech      Rate / Production: Normal/ Responsive      Volume:  Normal  volume  Orientation:  All  Mood:   Open and reflective; some sadness  Affect:   Congruent  Thought Content: Clear   Insight:   Good      Plan/Need for Future Services:  Return for group therapy in 2 weeks to treat diagnosed problems.    Patient has a current master individualized treatment plan.  See Epic treatment plan for more information.     Referral / Collaboration:  Referral to another professional/service is not indicated at this time.  Emergency Services Needed?  No     Assignment:  none     Interactive Complexity:  There are four specific communication difficulties that complicate the work of the primary psychiatric procedure.  Interactive complexity (+26872) may be reported when at least one of these difficulties is present.     Communication difficulties present during current the psychiatric procedure include:  None.       Candi Mera, PhD  Licensed Psychologist

## 2025-06-02 ENCOUNTER — VIRTUAL VISIT (OUTPATIENT)
Dept: PSYCHOLOGY | Facility: CLINIC | Age: 30
End: 2025-06-02
Payer: COMMERCIAL

## 2025-06-02 DIAGNOSIS — F33.3 MAJOR DEPRESSIVE DISORDER, RECURRENT, SEVERE WITH PSYCHOTIC FEATURES (H): Primary | ICD-10-CM

## 2025-06-02 DIAGNOSIS — F64.0 GENDER DYSPHORIA IN ADULT: ICD-10-CM

## 2025-06-02 DIAGNOSIS — F41.1 GENERALIZED ANXIETY DISORDER: ICD-10-CM

## 2025-06-02 NOTE — PROGRESS NOTES
[facility] - Progress Note    Date of Service: 25   Name: Kd Slater  : 1995  Medical Record Number: [id number]  Treating Provider: Tenisha Stewart PsyD  Type of Session: Individual  Present in Session: Client  Session Start and Stop Time: :55  Number of Minutes:  55    SERVICE MODALITY:  Video Visit:      Provider verified identity through the following two step process.  Patient provided:  Patient was verified at admission/transfer    Telemedicine Visit: The patient's condition can be safely assessed and treated via synchronous audio and visual telemedicine encounter.      Reason for Telemedicine Visit: Patient convenience (e.g. access to timely appointments / distance to available provider)    Originating Site (Patient Location): Patient's home    Distant Site (Provider Location): [facility]    Consent:  The patient/guardian has verbally consented to: the potential risks and benefits of telemedicine (video visit) versus in person care; bill my insurance or make self-payment for services provided; and responsibility for payment of non-covered services.     Patient would like the video invitation sent by:  My Chart    Mode of Communication:  Video Conference via [facility]    Distant Location (Provider):  On-site    As the provider I attest to compliance with applicable laws and regulations related to telemedicine.    DSM-5 Diagnoses:  Encounter Diagnoses   Name Primary?    Gender dysphoria in adult     Major depressive disorder, recurrent, severe with psychotic features (H) Yes    Generalized anxiety disorder        Current Reported Symptoms and Status update:  Changes since last session-increased social anxiety, depression, fear, anxiety, concerns about gender dysphoria. Low mood, paranoia, dissociation continue.        Progress Toward Treatment Goals:   Minimal progress     Therapeutic Interventions/Treatment Strategies:    Area(s) of  treatment focus addressed in this session included Symptom Management, Interpersonal Relationship Skills, Gender Health, and Develop / Improve Independent Living Skills    Psychotherapist offered support, feedback and validation, provided redirection, and reinforced use of skills Treatment modalities used include Narrative Therapy Gender Affirming Care Interpersonal Symptoms Management: Promoted understanding of their diagnoses and how it impacts their functioning, Other: Assisted patient  in finding ways to adapt functioning in light of past traumatic experiences and Explored with patient how life transitions may impact mental health and functioning , and discussed application of self compassion and discussed gender literacy  Support, Redirection, and Feedback    Patient Response:   Patient responded to session by verbalizing understanding and actively engaged  Possible barriers to participation / learning include: N/A    Current Mental Status Exam:   Appearance:  Appropriate   Eye Contact:  Good   Attitude / Demeanor: Cooperative   Speech      Rate / Production: Normal/ Responsive      Volume:  Normal  volume  Orientation:  All  Mood:   Depressed   Affect:   Tearful  Thought Content: Clear   Insight:   Good       Plan/Need for Future Services:  Return for therapy in 1-2 weeks to treat diagnosed problems.    Patient has a current master individualized treatment plan.  See Epic treatment plan for more information.    Referral / Collaboration:  Referral to another professional/service is not indicated at this time..  Emergency Services Needed?  No    Assignment:  none    Interactive Complexity:  There are four specific communication difficulties that complicate the work of the primary psychiatric procedure.  Interactive complexity (+[id number]) may be reported when at least one of these difficulties is present.    Communication difficulties present during current the psychiatric procedure  include:  None.      Signature/Title:    Tenisha Stewart PsyD

## 2025-06-02 NOTE — NURSING NOTE
Current patient location: [address]  [facility] [address]    Is the patient currently in the state of [place]? YES    Visit mode: VIDEO    If the visit is dropped, the patient can be reconnected by:VIDEO VISIT: Text to cell phone:   Telephone Information:   Mobile [phone]       Will anyone else be joining the visit? NO  (If patient encounters technical issues they should call [phone] :[phone])    Are changes needed to the allergy or medication list? N/A    Are refills needed on medications prescribed by this physician? NO    Rooming Documentation:  [id number] completed    Reason for visit: RECHECK    Shobha ALFARO

## 2025-06-09 ENCOUNTER — VIRTUAL VISIT (OUTPATIENT)
Dept: PSYCHOLOGY | Facility: CLINIC | Age: 30
End: 2025-06-09
Payer: COMMERCIAL

## 2025-06-09 ENCOUNTER — OFFICE VISIT (OUTPATIENT)
Dept: PSYCHOLOGY | Facility: CLINIC | Age: 30
End: 2025-06-09
Payer: COMMERCIAL

## 2025-06-09 DIAGNOSIS — F41.1 GENERALIZED ANXIETY DISORDER: ICD-10-CM

## 2025-06-09 DIAGNOSIS — F33.3 MAJOR DEPRESSIVE DISORDER, RECURRENT, SEVERE WITH PSYCHOTIC FEATURES (H): ICD-10-CM

## 2025-06-09 DIAGNOSIS — F64.0 GENDER DYSPHORIA IN ADULT: Primary | ICD-10-CM

## 2025-06-09 PROCEDURE — 90837 PSYTX W PT 60 MINUTES: CPT | Mod: 95 | Performed by: MARRIAGE & FAMILY THERAPIST

## 2025-06-09 PROCEDURE — 90853 GROUP PSYCHOTHERAPY: CPT | Performed by: PSYCHOLOGIST

## 2025-06-09 NOTE — PROGRESS NOTES
[facility] - Progress Note    Date of Service: 25   Name: Kd Slater  : 1995  Medical Record Number: [id number]  Treating Provider: Tenisha Stewart PsyD  Type of Session: Individual  Present in Session: Client  Session Start and Stop Time: 9:05-10  Number of Minutes:  55    SERVICE MODALITY:  Video Visit:      Provider verified identity through the following two step process.  Patient provided:  Patient was verified at admission/transfer    Telemedicine Visit: The patient's condition can be safely assessed and treated via synchronous audio and visual telemedicine encounter.      Reason for Telemedicine Visit: Patient convenience (e.g. access to timely appointments / distance to available provider)    Originating Site (Patient Location): Patient's home    Distant Site (Provider Location): [facility]    Consent:  The patient/guardian has verbally consented to: the potential risks and benefits of telemedicine (video visit) versus in person care; bill my insurance or make self-payment for services provided; and responsibility for payment of non-covered services.     Patient would like the video invitation sent by:  My Chart    Mode of Communication:  Video Conference via [facility]    Distant Location (Provider):  On-site    As the provider I attest to compliance with applicable laws and regulations related to telemedicine.    DSM-5 Diagnoses:  Encounter Diagnoses   Name Primary?    Gender dysphoria in adult Yes    Major depressive disorder, recurrent, severe with psychotic features (H)     Generalized anxiety disorder      Current Reported Symptoms and Status update:  Changes since last session- improvements in mood, anxiety, and reduced dysphoria.     Progress Toward Treatment Goals:   Satisfactory progress     Therapeutic Interventions/Treatment Strategies:    Area(s) of treatment focus addressed in this session included Symptom Management,  Interpersonal Relationship Skills, and Gender Health    Explored changes in status since last week, role of internalized misogyny and homophobia in how self talk, shame, questioning of gender shows up.    Psychotherapist offered support, feedback and validation and reinforced use of skills Treatment modalities used include Gender Affirming Care Interpersonal Radical Healing  Coping Skills: Promoted understanding of how and when to apply grounding strategies to reduce distress and increase presence in the moment, Symptoms Management: Promoted understanding of their diagnoses and how it impacts their functioning, and Other: Explored with patient how life transitions may impact mental health and functioning   Support, Redirection, and Feedback    Patient Response:   Patient responded to session by verbalizing understanding and actively engaged  Possible barriers to participation / learning include: N/A    Current Mental Status Exam:   Appearance:  Appropriate   Eye Contact:  Good   Attitude / Demeanor: Cooperative   Speech      Rate / Production: Normal/ Responsive      Volume:  Normal  volume  Orientation:  All  Mood:   Normal  Affect:   Appropriate   Thought Content: Clear   Insight:   Good       Plan/Need for Future Services:  Return for therapy in 1 weeks to treat diagnosed problems.    Patient has a current master individualized treatment plan.  See Epic treatment plan for more information.    Referral / Collaboration:  Referral to another professional/service is not indicated at this time..  Emergency Services Needed?  No    Assignment:  Start to journal related to gender presentation and feelings of misogyny    Interactive Complexity:  There are four specific communication difficulties that complicate the work of the primary psychiatric procedure.  Interactive complexity (+58350) may be reported when at least one of these difficulties is present.    Communication difficulties present during current the  psychiatric procedure include:  None.      Signature/Title:    Tenisha Stewart PsyD

## 2025-06-09 NOTE — NURSING NOTE
Current patient location: [address]  [facility] [address]    Is the patient currently in the state of [place]? YES    Visit mode: VIDEO    If the visit is dropped, the patient can be reconnected by:VIDEO VISIT: Text to cell phone:   Telephone Information:   Mobile [phone]       Will anyone else be joining the visit? NO  (If patient encounters technical issues they should call [phone])    Are changes needed to the allergy or medication list? N/A    Are refills needed on medications prescribed by this physician? NO    Rooming Documentation:  Not applicable    Reason for visit: RECHECK    Leslie ALFARO

## 2025-06-10 NOTE — PROGRESS NOTES
"[facility] Progress [facility]    Date of Service: 25      Name: Kd Reyes)  : 1995  Medical Record Number: [id number]  Treating Provider: Candi Mera, PhD   Type of Session: group  Present in Session: Client and 7 other group members (8/9 group members attending)  Type of Session: Group  Number of Minutes: 120 mins   Therapist(s): Candi Mera, PhD,      Video start time: 6:30 PM  Video end time: 8:30 PM    SERVICE MODALITY: In Person    DSM-5 Diagnoses:  F64.0 - Gender Dysphoria in Adolescent and Adult   296.33 Recurrent Major depression      Current Reported Symptoms and Status update:  Experiences gender dysphoria;  ups and down with mood and self-perception, experiences paranoia and struggles in interpersonal relationships.      Changes since last session: has been doing well; got new place to live that feels positively about; still employed     Progress Toward Treatment Goals:   Satisfactory      Therapeutic Interventions/Treatment Strategies:     Area(s) of treatment focus addressed in this session included Symptom Management, Interpersonal Relationship Skills, Gender Health and Wellness       Cognitive behavioral, interpersonal, and group therapy interventions were utilized to explore gender dysphoria (i.e., transition, interpersonal relationships, and medical interventions) and mental health symptoms, specifically depression and anxiety.  Reported that since giving up on the expectations of binary gender norms, has felt \"free.\"  Is realizing that \"living without a label is way better for me.\"  Realizing that when allowing self to embrace fluidity, gender issues are not as debilitating.  Did not request time unless there was time leftover but did provide supportive feedback to others and spoke about other's resonating with own experiences.     [facility] THERAPY INTERVENTIONS: Motivational Interviewing Group Dynamic Therapy  Behavioral Activation: Explored how " behaviors effect mood and interact with thoughts and feelings;  Promoting gender health and resiliency.      Patient Response:   Patient responded to session by listening and actively engaged.      Possible barriers to participation / learning include: long-term mental health concerns     Current Mental Status Exam:   Appearance:  Appropriate   Eye Contact:  Good   Attitude / Demeanor: Engaged   Speech      Rate / Production: Normal/ Responsive      Volume:  Normal  volume  Orientation:  All  Mood:   Open and reflective; overall positive  Affect:   Congruent  Thought Content: Clear   Insight:   Good      Plan/Need for Future Services:  Return for group therapy in 2 weeks to treat diagnosed problems.    Patient has a current master individualized treatment plan.  See Epic treatment plan for more information.     Referral / Collaboration:  Referral to another professional/service is not indicated at this time.  Emergency Services Needed?  No     Assignment:  none     Interactive Complexity:  There are four specific communication difficulties that complicate the work of the primary psychiatric procedure.  Interactive complexity (+81156) may be reported when at least one of these difficulties is present.     Communication difficulties present during current the psychiatric procedure include:  None.       Candi Mera, PhD  Licensed Psychologist

## 2025-06-15 ENCOUNTER — HEALTH MAINTENANCE LETTER (OUTPATIENT)
Age: 30
End: 2025-06-15

## 2025-06-16 ENCOUNTER — VIRTUAL VISIT (OUTPATIENT)
Dept: PSYCHOLOGY | Facility: CLINIC | Age: 30
End: 2025-06-16
Payer: COMMERCIAL

## 2025-06-16 DIAGNOSIS — F64.0 GENDER DYSPHORIA IN ADULT: ICD-10-CM

## 2025-06-16 DIAGNOSIS — F33.3 MAJOR DEPRESSIVE DISORDER, RECURRENT, SEVERE WITH PSYCHOTIC FEATURES (H): Primary | ICD-10-CM

## 2025-06-16 DIAGNOSIS — F41.1 GENERALIZED ANXIETY DISORDER: ICD-10-CM

## 2025-06-16 PROCEDURE — 90837 PSYTX W PT 60 MINUTES: CPT | Mod: 95 | Performed by: MARRIAGE & FAMILY THERAPIST

## 2025-06-16 NOTE — PROGRESS NOTES
[facility] - Progress Note    Date of Service: 25   Name: Kd Slater  : 1995  Medical Record Number: [id number]  Treating Provider: Tenisha Stewart PsyD  Type of Session: Individual  Present in Session: Client  Session Start and Stop Time: 9:03-10  Number of Minutes:  57    SERVICE MODALITY:  Video Visit:      Provider verified identity through the following two step process.  Patient provided:  Patient was verified at admission/transfer    Telemedicine Visit: The patient's condition can be safely assessed and treated via synchronous audio and visual telemedicine encounter.      Reason for Telemedicine Visit: Patient convenience (e.g. access to timely appointments / distance to available provider)    Originating Site (Patient Location): Patient's home    Distant Site (Provider Location): [facility]    Consent:  The patient/guardian has verbally consented to: the potential risks and benefits of telemedicine (video visit) versus in person care; bill my insurance or make self-payment for services provided; and responsibility for payment of non-covered services.     Patient would like the video invitation sent by:  My Chart    Mode of Communication:  Video Conference via [facility]    Distant Location (Provider):  On-site    As the provider I attest to compliance with applicable laws and regulations related to telemedicine.    DSM-5 Diagnoses:  Encounter Diagnoses   Name Primary?    Gender dysphoria in adult     Major depressive disorder, recurrent, severe with psychotic features (H) Yes    Generalized anxiety disorder      Current Reported Symptoms and Status update:  Changes since last session client reports difficult time around fathers day, grief over loss of grandfather in past year. Client reports continued anxiety, depressed mood, realization around projection of unowned parts of self.     Progress Toward Treatment Goals:   Satisfactory  progress     Therapeutic Interventions/Treatment Strategies:    Area(s) of treatment focus addressed in this session included Symptom Management, Interpersonal Relationship Skills, Gender Health, and Cultural     Psychotherapist offered support, feedback and validation, provided redirection, and reinforced use of skills Treatment modalities used include Narrative Therapy Gender Affirming Care Interpersonal Radical Healing  Cognitive Restructuring:  Assisted patient in formulating new neutral/positive alternatives to challenge less helpful / ineffective thoughts, Symptoms Management: explored patterns of thought related to projecting insecurities, and Other: discussed ways to utilize body based experiences to help client feel embodied in their choices related to gender affirmation.  Support, Feedback, and Structured Activity    Patient Response:   Patient responded to session by verbalizing understanding and actively engaged  Possible barriers to participation / learning include: system oppression    Current Mental Status Exam:   Appearance:  Appropriate   Eye Contact:  Good   Attitude / Demeanor: Cooperative   Speech      Rate / Production: Normal/ Responsive      Volume:  Normal  volume  Orientation:  All  Mood:   Sad   Affect:   Appropriate   Thought Content: Clear   Insight:   Fair       Plan/Need for Future Services:  Return for therapy in 2 weeks to treat diagnosed problems.    Patient has a current master individualized treatment plan.  See Epic treatment plan for more information.    Referral / Collaboration:  Referral to another professional/service is not indicated at this time..  Emergency Services Needed?  No    Assignment:  None    Interactive Complexity:  There are four specific communication difficulties that complicate the work of the primary psychiatric procedure.  Interactive complexity (+58392) may be reported when at least one of these difficulties is present.    Communication difficulties present  during current the psychiatric procedure include:  None.      Signature/Title:    Tenisha Stewart PsyD

## 2025-06-16 NOTE — NURSING NOTE
Current patient location: [address]  [facility] [address]    Is the patient currently in the state of [place]? YES    Visit mode: VIDEO    If the visit is dropped, the patient can be reconnected by:VIDEO VISIT: Send to e-mail at: [email or web address]    Will anyone else be joining the visit? NO  (If patient encounters technical issues they should call [phone]    Are changes needed to the allergy or medication list? No    Are refills needed on medications prescribed by this physician? NO    Rooming Documentation:  Patient will complete questionnaire(s) in [facility]    Reason for visit: RECHECK    Zarina MCKEONF

## 2025-06-23 ENCOUNTER — OFFICE VISIT (OUTPATIENT)
Dept: PSYCHOLOGY | Facility: CLINIC | Age: 30
End: 2025-06-23
Payer: COMMERCIAL

## 2025-06-23 DIAGNOSIS — F64.0 GENDER DYSPHORIA IN ADULT: Primary | ICD-10-CM

## 2025-06-23 DIAGNOSIS — F41.1 GENERALIZED ANXIETY DISORDER: ICD-10-CM

## 2025-06-23 DIAGNOSIS — F33.3 MAJOR DEPRESSIVE DISORDER, RECURRENT, SEVERE WITH PSYCHOTIC FEATURES (H): ICD-10-CM

## 2025-06-23 PROCEDURE — 90853 GROUP PSYCHOTHERAPY: CPT | Performed by: PSYCHOLOGIST

## 2025-06-23 NOTE — PROGRESS NOTES
"[facility] - Progress Note    Date of Service: 25      Name: Kd Slater (Dav)  : 1995  Medical Record Number: [id number]  Treating Provider: Candi Mera, PhD   Type of Session: group  Present in Session: Client and 4 other group members (5/9 group members attending)  Type of Session: Group  Number of Minutes: 120 mins   Therapist(s): Candi Mera, PhD,      Video start time: 6:30 PM  Video end time: 8:30 PM    SERVICE MODALITY: In Person    DSM-5 Diagnoses:  F64.0 - Gender Dysphoria in Adolescent and Adult   296.33 Recurrent Major depression      Current Reported Symptoms and Status update:  Experiences gender dysphoria;  ups and down with mood and self-perception, experiences paranoia and struggles in interpersonal relationships.      Changes since last session: continues to do well; recognizing level of chameleon; still employed; using slogan \"big baby get it done\"    Progress Toward Treatment Goals:   Satisfactory      Therapeutic Interventions/Treatment Strategies:     Area(s) of treatment focus addressed in this session included Symptom Management, Interpersonal Relationship Skills, Gender Health and Wellness       Cognitive behavioral, interpersonal, and group therapy interventions were utilized to explore gender dysphoria (i.e., transition, interpersonal relationships, and medical interventions) and mental health symptoms, specifically depression and anxiety.  Shared the extent to which Dav was angry and had an attitude problem as a child. Shared that caregivers would say \"put bad Kd under the bed.\"  Seeing the ways that this shut self down and is tied to not knowing self or know what they want to do.  This includes the self-discovery around gender norms and the ways these have influenced how they think about themselves.     [facility] THERAPY INTERVENTIONS: Motivational Interviewing Group Dynamic Therapy  Behavioral Activation: Explored how behaviors effect " mood and interact with thoughts and feelings;  Promoting gender health and resiliency.      Patient Response:   Patient responded to session by listening and actively engaged.      Possible barriers to participation / learning include: long-term mental health concerns     Current Mental Status Exam:   Appearance:  Appropriate   Eye Contact:  Good   Attitude / Demeanor: Engaged   Speech      Rate / Production: Normal/ Responsive      Volume:  Normal  volume  Orientation:  All  Mood:   Open and reflective; overall positive  Affect:   Congruent  Thought Content: Clear   Insight:   Good      Plan/Need for Future Services:  Return for group therapy in 2 weeks to treat diagnosed problems.    Patient has a current master individualized treatment plan.  See Epic treatment plan for more information.     Referral / Collaboration:  Referral to another professional/service is not indicated at this time.  Emergency Services Needed?  No     Assignment:  none     Interactive Complexity:  There are four specific communication difficulties that complicate the work of the primary psychiatric procedure.  Interactive complexity (+18878) may be reported when at least one of these difficulties is present.     Communication difficulties present during current the psychiatric procedure include:  None.       Candi Mera, PhD  Licensed Psychologist

## 2025-06-30 ENCOUNTER — VIRTUAL VISIT (OUTPATIENT)
Dept: PSYCHOLOGY | Facility: CLINIC | Age: 30
End: 2025-06-30
Payer: COMMERCIAL

## 2025-06-30 DIAGNOSIS — F41.1 GENERALIZED ANXIETY DISORDER: Primary | ICD-10-CM

## 2025-06-30 PROCEDURE — 90837 PSYTX W PT 60 MINUTES: CPT | Mod: 95 | Performed by: MARRIAGE & FAMILY THERAPIST

## 2025-06-30 NOTE — NURSING NOTE
Is the patient currently in the state of [place]? YES    Current patient location: [address]  [facility] [address]    Visit mode:Video    If the visit is dropped, the patient can be reconnected by: VIDEO VISIT: Text to cell phone:   Telephone Information:   Mobile [phone]       Will anyone else be joining the visit? No  (If patient encounters technical issues they should call [phone])    Are changes needed to the allergy or medication list? N/A    Are refills needed on medications prescribed by this physician? No    Rooming Documentation: Questionnaire(s) not done per department protocol.    Reason for visit: RECHECK     ANGELINA Pace

## 2025-06-30 NOTE — PROGRESS NOTES
[facility] - Progress Note    Date of Service: 25   Name: Kd Slater  : 1995  Medical Record Number: [id number]  Treating Provider: Tenisha Stewart PsyD  Type of Session: Individual  Present in Session: Client  Session Start and Stop Time: 10:02-10:55  Number of Minutes: 53    SERVICE MODALITY:  Video Visit:      Provider verified identity through the following two step process.  Patient provided:  Patient was verified at admission/transfer    Telemedicine Visit: The patient's condition can be safely assessed and treated via synchronous audio and visual telemedicine encounter.      Reason for Telemedicine Visit: Patient convenience (e.g. access to timely appointments / distance to available provider)    Originating Site (Patient Location): Patient's home    Distant Site (Provider Location): [facility]    Consent:  The patient/guardian has verbally consented to: the potential risks and benefits of telemedicine (video visit) versus in person care; bill my insurance or make self-payment for services provided; and responsibility for payment of non-covered services.     Patient would like the video invitation sent by:  My Chart    Mode of Communication:  Video Conference via [facility]    Distant Location (Provider):  On-site    As the provider I attest to compliance with applicable laws and regulations related to telemedicine.    DSM-5 Diagnoses:  Encounter Diagnosis   Name Primary?    Generalized anxiety disorder Yes     Current Reported Symptoms and Status update:  Changes since last session-increased     Progress Toward Treatment Goals:   Satisfactory progress     Therapeutic Interventions/Treatment Strategies:    Area(s) of treatment focus addressed in this session included Symptom Management, Interpersonal Relationship Skills, and Gender Health  Explored flexible gender presentation, gender identity, sexual orientation.    Psychotherapist  offered support, feedback and validation, provided redirection, and reinforced use of skills Treatment modalities used include Narrative Therapy Gender Affirming Care Interpersonal Symptoms Management: Promoted understanding of their diagnoses and how it impacts their functioning, Relationship Skills: Assisted patients in implementing more effective communication skills in their relationships and Discussed strategies to promote healthier understanding of interpersonal relationships, and facilitated discussion about sexual health and wellness, discussed gender literacy, and explored challenging unrealistic expectations of self/others  Support, Redirection, and Feedback    Patient Response:   Patient responded to session by verbalizing understanding and actively engaged  Possible barriers to participation / learning include: N/A    Current Mental Status Exam:   Appearance:  Appropriate   Eye Contact:  Good   Attitude / Demeanor: Cooperative   Speech      Rate / Production: Normal/ Responsive      Volume:  Normal  volume  Orientation:  All  Mood:   Normal  Affect:   Appropriate   Thought Content: Clear   Insight:   Good       Plan/Need for Future Services:  Return for therapy in 2 weeks to treat diagnosed problems.    Patient has a current master individualized treatment plan.  See Epic treatment plan for more information.    Referral / Collaboration:  Referral to another professional/service is not indicated at this time..  Emergency Services Needed?  No    Assignment:  None    Interactive Complexity:  There are four specific communication difficulties that complicate the work of the primary psychiatric procedure.  Interactive complexity (+82528) may be reported when at least one of these difficulties is present.    Communication difficulties present during current the psychiatric procedure include:  None.      Signature/Title:    Tenisha Stewart PsyD

## 2025-07-07 ENCOUNTER — VIRTUAL VISIT (OUTPATIENT)
Dept: PSYCHOLOGY | Facility: CLINIC | Age: 30
End: 2025-07-07
Payer: COMMERCIAL

## 2025-07-07 DIAGNOSIS — F64.0 GENDER DYSPHORIA IN ADULT: Primary | ICD-10-CM

## 2025-07-07 DIAGNOSIS — F41.1 GENERALIZED ANXIETY DISORDER: ICD-10-CM

## 2025-07-07 PROCEDURE — 90834 PSYTX W PT 45 MINUTES: CPT | Mod: 95 | Performed by: MARRIAGE & FAMILY THERAPIST

## 2025-07-07 NOTE — PROGRESS NOTES
[facility] - Progress Note    Date of Service: 25   Name: Kd Slater  : 1995  Medical Record Number: [id number]  Treating Provider: Tensiha Stewart PsyD  Type of Session: Individual  Present in Session: Client  Session Start and Stop Time:9:10-10  Number of Minutes: 50    SERVICE MODALITY:  Video Visit:      Provider verified identity through the following two step process.  Patient provided:  Patient was verified at admission/transfer    Telemedicine Visit: The patient's condition can be safely assessed and treated via synchronous audio and visual telemedicine encounter.      Reason for Telemedicine Visit: Patient convenience (e.g. access to timely appointments / distance to available provider)    Originating Site (Patient Location): Patient's home    Distant Site (Provider Location): [facility]    Consent:  The patient/guardian has verbally consented to: the potential risks and benefits of telemedicine (video visit) versus in person care; bill my insurance or make self-payment for services provided; and responsibility for payment of non-covered services.     Patient would like the video invitation sent by:  Send to e-mail at: [email or web address]    Mode of Communication:  Video Conference via D[employer]    Distant Location (Provider):  On-site    As the provider I attest to compliance with applicable laws and regulations related to telemedicine.    DSM-5 Diagnoses:  Encounter Diagnoses   Name Primary?    Generalized anxiety disorder     Gender dysphoria in adult Yes     Current Reported Symptoms and Status update:  Changes since last session- client reports feeling motivated, productive, less anxious, less dysphoria.     Progress Toward Treatment Goals:   Satisfactory progress     Therapeutic Interventions/Treatment Strategies:    Area(s) of treatment focus addressed in this session included Symptom Management, Interpersonal  "Relationship Skills, Gender Health, and Physical Health     Discussed how feeling authentic and embodied decreases dysphoria, timing of gender expression, desire to be \"out\" versus not discriminated, thoughts about reducing HRT.     Psychotherapist offered support, feedback and validation, provided redirection, and reinforced use of skills Treatment modalities used include Narrative Therapy Gender Affirming Care Interpersonal Other: Explored with patient how life transitions may impact mental health and functioning , Relationship Skills: Discussed strategies to promote healthier understanding of interpersonal relationships, and discussed gender literacy, facilitated discussion about medical interventions, and explored challenging unrealistic expectations of self/others  Support, Redirection, and Feedback    Patient Response:   Patient responded to session by verbalizing understanding and actively engaged  Possible barriers to participation / learning include: N/A    Current Mental Status Exam:   Appearance:  Appropriate   Eye Contact:  Good   Attitude / Demeanor: Cooperative   Speech      Rate / Production: Normal/ Responsive      Volume:  Normal  volume  Orientation:  All  Mood:   Normal  Affect:   Appropriate   Thought Content: Clear   Insight:   Good       Plan/Need for Future Services:  Return for therapy in 1-2 weeks to treat diagnosed problems.    Patient has a current master individualized treatment plan.  See Epic treatment plan for more information.    Referral / Collaboration:  Referral to another professional/service is not indicated at this time..  Emergency Services Needed?  No    Assignment:  None    Interactive Complexity:  There are four specific communication difficulties that complicate the work of the primary psychiatric procedure.  Interactive complexity (+40467) may be reported when at least one of these difficulties is present.    Communication difficulties present during current the " psychiatric procedure include:  None.      Signature/Title:    Tenisha Stewart PsyD

## 2025-07-07 NOTE — NURSING NOTE
Is the patient currently in the state of [place]? YES    Current patient location: [address]  [facility] [address]    Visit mode:Video    If the visit is dropped, the patient can be reconnected by: VIDEO VISIT: Text to cell phone:   Telephone Information:   Mobile [phone]       Will anyone else be joining the visit? No  (If patient encounters technical issues they should call [phone])    Are changes needed to the allergy or medication list? N/A    Are refills needed on medications prescribed by this physician? No    Rooming Documentation: Questionnaire(s) completed.    Reason for visit: RECHECK     Justine Hobbs, MIKEF

## 2025-07-14 ENCOUNTER — OFFICE VISIT (OUTPATIENT)
Dept: PSYCHOLOGY | Facility: CLINIC | Age: 30
End: 2025-07-14
Payer: COMMERCIAL

## 2025-07-14 DIAGNOSIS — F41.1 GENERALIZED ANXIETY DISORDER: ICD-10-CM

## 2025-07-14 DIAGNOSIS — F33.3 MAJOR DEPRESSIVE DISORDER, RECURRENT, SEVERE WITH PSYCHOTIC FEATURES (H): ICD-10-CM

## 2025-07-14 DIAGNOSIS — F64.0 GENDER DYSPHORIA IN ADULT: Primary | ICD-10-CM

## 2025-07-14 PROCEDURE — 90853 GROUP PSYCHOTHERAPY: CPT | Performed by: PSYCHOLOGIST

## 2025-07-15 NOTE — PROGRESS NOTES
[facility] - Progress Note    Date of Service: 25      Name: Kd Reyes)  : 1995  Medical Record Number: [id number]  Treating Provider: Candi Mera, PhD   Type of Session: group  Present in Session: Client and 7 other group members (8/9 group members attending)  Type of Session: Group  Number of Minutes: 120 mins   Therapist(s): Candi Mera, PhD, LP     Video start time: 6:30 PM  Video end time: 8:30 PM    SERVICE MODALITY: In Person    DSM-5 Diagnoses:  F64.0 - Gender Dysphoria in Adolescent and Adult   296.33 Recurrent Major depression      Current Reported Symptoms and Status update:  Experiences gender dysphoria;  ups and down with mood and self-perception, experiences paranoia and struggles in interpersonal relationships.      Changes since last session: went off hormones but then went back on; feeling that others are more kind and open; wants genetic children    Progress Toward Treatment Goals:   Satisfactory      Therapeutic Interventions/Treatment Strategies:     Area(s) of treatment focus addressed in this session included Symptom Management, Interpersonal Relationship Skills, Gender Health and Wellness       Cognitive behavioral, interpersonal, and group therapy interventions were utilized to explore gender dysphoria (i.e., transition, interpersonal relationships, and medical interventions) and mental health symptoms, specifically depression and anxiety.  Shared the realization that when has a crush, feels the need to change gender self.  Continues to feels better when gender is not the sole focus.  Is more and more appreciating that does not have to conform to gender norms and transnormativity messages.      [facility] THERAPY INTERVENTIONS: Motivational Interviewing Group Dynamic Therapy  Behavioral Activation: Explored how behaviors effect mood and interact with thoughts and feelings;  Promoting gender health and resiliency.      Patient Response:   Patient  responded to session by listening and actively engaged.      Possible barriers to participation / learning include: long-term mental health concerns     Current Mental Status Exam:   Appearance:  Appropriate   Eye Contact:  Good   Attitude / Demeanor: Engaged   Speech      Rate / Production: Normal/ Responsive      Volume:  Normal  volume  Orientation:  All  Mood:   Open and reflective; overall positive  Affect:   Congruent  Thought Content: Clear   Insight:   Good      Plan/Need for Future Services:  Return for group therapy in 2 weeks to treat diagnosed problems.    Patient has a current master individualized treatment plan.  See Epic treatment plan for more information.     Referral / Collaboration:  Referral to another professional/service is not indicated at this time.  Emergency Services Needed?  No     Assignment:  none     Interactive Complexity:  There are four specific communication difficulties that complicate the work of the primary psychiatric procedure.  Interactive complexity (+12293) may be reported when at least one of these difficulties is present.     Communication difficulties present during current the psychiatric procedure include:  None.       Candi Mera, PhD  Licensed Psychologist

## 2025-07-17 ENCOUNTER — VIRTUAL VISIT (OUTPATIENT)
Dept: PSYCHOLOGY | Facility: CLINIC | Age: 30
End: 2025-07-17
Payer: COMMERCIAL

## 2025-07-17 DIAGNOSIS — F64.0 GENDER DYSPHORIA IN ADULT: ICD-10-CM

## 2025-07-17 DIAGNOSIS — F41.1 GENERALIZED ANXIETY DISORDER: Primary | ICD-10-CM

## 2025-07-17 DIAGNOSIS — F33.3 MAJOR DEPRESSIVE DISORDER, RECURRENT, SEVERE WITH PSYCHOTIC FEATURES (H): ICD-10-CM

## 2025-07-17 NOTE — PROGRESS NOTES
[facility] - Progress Note    Date of Service: 25   Name: Kd Slater  : 1995  Medical Record Number: [id number]  Treating Provider: Tenisah Stewart PsyD  Type of Session: Individual  Present in Session: Client   Session Start and Stop Time: 1:03-2:00  Number of Minutes: 57    SERVICE MODALITY:  Video Visit:      Provider verified identity through the following two step process.  Patient provided:  Patient was verified at admission/transfer    Telemedicine Visit: The patient's condition can be safely assessed and treated via synchronous audio and visual telemedicine encounter.      Reason for Telemedicine Visit: Patient convenience (e.g. access to timely appointments / distance to available provider)    Originating Site (Patient Location): Patient's home    Distant Site (Provider Location): Provider Remote Setting- Home Office    Consent:  The patient/guardian has verbally consented to: the potential risks and benefits of telemedicine (video visit) versus in person care; bill my insurance or make self-payment for services provided; and responsibility for payment of non-covered services.     Patient would like the video invitation sent by:  My Chart    Mode of Communication:  Video Conference via Amwell    Distant Location (Provider):  On-site    As the provider I attest to compliance with applicable laws and regulations related to telemedicine.    DSM-5 Diagnoses:  Encounter Diagnoses   Name Primary?    Gender dysphoria in adult     Generalized anxiety disorder Yes    Major depressive disorder, recurrent, severe with psychotic features (H)      Current Reported Symptoms and Status update:  Changes since last session- increased insight around patterns of self-sabotage, questioning around gender. Overthinking, anxiety, rumination, continued internalized misogyny, trans misogyny.      Progress Toward Treatment Goals:   Satisfactory progress     Therapeutic Interventions/Treatment  Strategies:    Area(s) of treatment focus addressed in this session included Symptom Management, Interpersonal Relationship Skills, Sexual Health and Wellness, and Gender Health    Explored barriers of self-sabotage, insecurity, gender identity and genital dysphoria. Processed questions around anatomical/sexual embodiment, reproductive goals.     Psychotherapist offered support, feedback and validation and reinforced use of skills Treatment modalities used include Gender Affirming Care Sex Therapy Feminist Informed Interpersonal Cognitive Restructuring:  Assisted patient in formulating new neutral/positive alternatives to challenge less helpful / ineffective thoughts, Relationship Skills: Assisted patients in implementing more effective communication skills in their relationships, and facilitated discussion about medical interventions and facilitated discussion about sexual pleasure and sexual satisfaction  Support and Feedback    Patient Response:   Patient responded to session by verbalizing understanding and actively engaged  Possible barriers to participation / learning include: N/A    Current Mental Status Exam:   Appearance:  Appropriate   Eye Contact:  Good   Attitude / Demeanor: Cooperative   Speech      Rate / Production: Normal/ Responsive      Volume:  Normal  volume  Orientation:  All  Mood:   Normal  Affect:   Appropriate   Thought Content: Clear   Insight:   Good       Plan/Need for Future Services:  Return for therapy in 1-2 weeks to treat diagnosed problems.    Patient has a current master individualized treatment plan.  See Epic treatment plan for more information.    Referral / Collaboration:  Referral to another professional/service is not indicated at this time..  Emergency Services Needed?  No    Assignment:  None    Interactive Complexity:  There are four specific communication difficulties that complicate the work of the primary psychiatric procedure.  Interactive complexity (+40736) may be  reported when at least one of these difficulties is present.    Communication difficulties present during current the psychiatric procedure include:  None.      Signature/Title:    Tenisha Stewart PsyD

## 2025-07-17 NOTE — NURSING NOTE
Is the patient currently in the state of [place]? YES    Current patient location: [address]  [facility] [address]    Visit mode:Video    If the visit is dropped, the patient can be reconnected by: VIDEO VISIT:  Send e-mail to at [email or web address]    Will anyone else be joining the visit? No  (If patient encounters technical issues they should call [phone])    Are changes needed to the allergy or medication list? N/A    Are refills needed on medications prescribed by this physician? No    Rooming Documentation: Patient declined to complete qnrs with [facility]. and [facility]    Reason for visit: RECHECK     Justine Hobbs, VVF

## 2025-07-21 ENCOUNTER — VIRTUAL VISIT (OUTPATIENT)
Dept: PSYCHOLOGY | Facility: CLINIC | Age: 30
End: 2025-07-21
Payer: COMMERCIAL

## 2025-07-21 DIAGNOSIS — F41.1 GENERALIZED ANXIETY DISORDER: ICD-10-CM

## 2025-07-21 DIAGNOSIS — F64.0 GENDER DYSPHORIA IN ADULT: Primary | ICD-10-CM

## 2025-07-21 PROCEDURE — 90837 PSYTX W PT 60 MINUTES: CPT | Mod: 95 | Performed by: MARRIAGE & FAMILY THERAPIST

## 2025-07-21 NOTE — PROGRESS NOTES
[facility] - Progress Note    Date of Service: 25   Name: Kd Slater  : 1995  Medical Record Number: [id number]  Treating Provider: Tenisha Stewart PsyD  Type of Session: Individual  Present in Session: Client  Session Start and Stop Time: 3:00-3:55  Number of Minutes: 55    SERVICE MODALITY:  Video Visit:      Provider verified identity through the following two step process.  Patient provided:  Patient was verified at admission/transfer    Telemedicine Visit: The patient's condition can be safely assessed and treated via synchronous audio and visual telemedicine encounter.      Reason for Telemedicine Visit: Patient convenience (e.g. access to timely appointments / distance to available provider)    Originating Site (Patient Location): Patient's home    Distant Site (Provider Location): [facility]    Consent:  The patient/guardian has verbally consented to: the potential risks and benefits of telemedicine (video visit) versus in person care; bill my insurance or make self-payment for services provided; and responsibility for payment of non-covered services.     Patient would like the video invitation sent by:  My Chart    Mode of Communication:  [facility]    Distant Location (Provider):  On-site    As the provider I attest to compliance with applicable laws and regulations related to telemedicine.    DSM-5 Diagnoses:  Encounter Diagnoses   Name Primary?    Gender dysphoria in adult Yes    Generalized anxiety disorder      Current Reported Symptoms and Status update:  Changes since last session-  increased insight around patterns of self-sabotage, questioning around gender. Overthinking, anxiety, rumination, continued internalized misogyny, trans misogyny.      Progress Toward Treatment Goals:   Satisfactory progress     Therapeutic Interventions/Treatment Strategies:    Area(s) of treatment focus addressed in this  session included Interpersonal Relationship Skills and Gender Health  Psychotherapist offered support, feedback and validation and reinforced use of skills Treatment modalities used include Narrative Therapy Interpersonal Humanist Cognitive Restructuring:  Facilitated recognition of the connection between negative thoughts and negative core beliefs, Other: Assisted patient  in finding ways to adapt functioning in light of past traumatic experiences, and Relationship Skills: Discussed strategies to promote healthier understanding of interpersonal relationships Explored role of early childhood messaging, critical child voice and how to deconstruct narratives around self and gender.  Support and Feedback    Patient Response:   Patient responded to session by verbalizing understanding and actively engaged  Possible barriers to participation / learning include: N/A    Current Mental Status Exam:   Appearance:  Appropriate   Eye Contact:  Good   Attitude / Demeanor: Cooperative   Speech      Rate / Production: Normal/ Responsive      Volume:  Normal  volume  Orientation:  All  Mood:   Normal  Affect:   Appropriate   Thought Content: Clear   Insight:   Good       Plan/Need for Future Services:  Return for therapy in 1-2 weeks to treat diagnosed problems.    Patient has a current master individualized treatment plan.  See Epic treatment plan for more information.    Referral / Collaboration:  Referral to another professional/service is not indicated at this time..  Emergency Services Needed?  No    Assignment:  None    Interactive Complexity:  There are four specific communication difficulties that complicate the work of the primary psychiatric procedure.  Interactive complexity (+84753) may be reported when at least one of these difficulties is present.    Communication difficulties present during current the psychiatric procedure include:  None.      Signature/Title:    Tenisha Stewart PsyD

## 2025-07-21 NOTE — NURSING NOTE
Current patient location: [address]  [facility] [address]    Is the patient currently in the state of [place]? YES    Visit mode: VIDEO    If the visit is dropped, the patient can be reconnected by:VIDEO VISIT: Text to cell phone:   Telephone Information:   Mobile [phone]       Will anyone else be joining the visit? NO  (If patient encounters technical issues they should call [phone])    Are changes needed to the allergy or medication list? N/A    Are refills needed on medications prescribed by this physician? NO    Rooming Documentation:  [id number] complete    Reason for visit: RECHECK    Shobha ALFARO

## 2025-07-28 ENCOUNTER — OFFICE VISIT (OUTPATIENT)
Dept: PSYCHOLOGY | Facility: CLINIC | Age: 30
End: 2025-07-28
Payer: COMMERCIAL

## 2025-07-28 ENCOUNTER — VIRTUAL VISIT (OUTPATIENT)
Dept: PSYCHOLOGY | Facility: CLINIC | Age: 30
End: 2025-07-28
Payer: COMMERCIAL

## 2025-07-28 DIAGNOSIS — F33.3 MAJOR DEPRESSIVE DISORDER, RECURRENT, SEVERE WITH PSYCHOTIC FEATURES (H): ICD-10-CM

## 2025-07-28 DIAGNOSIS — F64.0 GENDER DYSPHORIA IN ADULT: Primary | ICD-10-CM

## 2025-07-28 PROCEDURE — 90853 GROUP PSYCHOTHERAPY: CPT | Performed by: PSYCHOLOGIST

## 2025-07-28 ASSESSMENT — PATIENT HEALTH QUESTIONNAIRE - PHQ9
SUM OF ALL RESPONSES TO PHQ QUESTIONS 1-9: 1
SUM OF ALL RESPONSES TO PHQ QUESTIONS 1-9: 1
10. IF YOU CHECKED OFF ANY PROBLEMS, HOW DIFFICULT HAVE THESE PROBLEMS MADE IT FOR YOU TO DO YOUR WORK, TAKE CARE OF THINGS AT HOME, OR GET ALONG WITH OTHER PEOPLE: SOMEWHAT DIFFICULT

## 2025-07-28 NOTE — PROGRESS NOTES
"Virtual Visit Details    Type of service:  Video Visit     Originating Location (pt. Location): {video visit patient location:136007::\"Home\"}  {PROVIDER LOCATION On-site should be selected for visits conducted from your clinic location or adjoining [facility] hospital, academic office, or other nearby [facility] building. Off-site should be selected for all other provider locations, including home:669200}  Distant Location (provider location):  {virtual location provider:375429}  Platform used for Video Visit: {Virtual Visit Platforms:858418::\"[place]\"}  "

## 2025-07-28 NOTE — PROGRESS NOTES
"[facility]    Date of Service: 25      Name: Kd Reyes)  : 1995  Medical Record Number: [id number]  Treating Provider: Candi Mera, PhD   Type of Session: group  Present in Session: Client and 6 other group members (7/8 group members attending)  Type of Session: Group  Number of Minutes: 120 mins   Therapist(s): Candi Mera, PhD, LP     Video start time: 6:30 PM  Video end time: 8:30 PM    SERVICE MODALITY: In Person    DSM-5 Diagnoses:  F64.0 - Gender Dysphoria in Adolescent and Adult   296.33 Recurrent Major depression      Current Reported Symptoms and Status update:  Experiences gender dysphoria;  ups and down with mood and self-perception, experiences paranoia and struggles in interpersonal relationships.      Changes since last session: has switched to all pronouns and is fascinated that coworkers are consistent in using she/her.  Has had a good week.  Is appreciating therapy.     Progress Toward Treatment Goals:   Satisfactory      Therapeutic Interventions/Treatment Strategies:     Area(s) of treatment focus addressed in this session included Symptom Management, Interpersonal Relationship Skills, Gender Health and Wellness       Cognitive behavioral, interpersonal, and group therapy interventions were utilized to explore gender dysphoria (i.e., transition, interpersonal relationships, and medical interventions) and mental health symptoms, specifically depression and anxiety.  Shared with the group that they are figuring out that their sexuality is more of a difficulty to figure out and impact her above gender.  Provided some history of sexuality education growing up as negative.  Family did not talk about sexuality except to be homophobic.  Sees own sexuality as attraction to the person but feels a lot of shame about experiencing attraction to anyone.  Shared \"I have shame about how I identify sexually.\"      [facility] THERAPY INTERVENTIONS: " Motivational Interviewing Group Dynamic Therapy  Behavioral Activation: Explored how behaviors effect mood and interact with thoughts and feelings;  Promoting gender health and resiliency.      Patient Response:   Patient responded to session by listening and actively engaged.      Possible barriers to participation / learning include: long-term mental health concerns     Current Mental Status Exam:   Appearance:  Appropriate   Eye Contact:  Good   Attitude / Demeanor: Engaged   Speech      Rate / Production: Normal/ Responsive      Volume:  Normal  volume  Orientation:  All  Mood:   Open and reflective; overall positive  Affect:   Congruent  Thought Content: Clear   Insight:   Good      Plan/Need for Future Services:  Return for group therapy in 2 weeks to treat diagnosed problems.    Patient has a current master individualized treatment plan.  See Epic treatment plan for more information.     Referral / Collaboration:  Referral to another professional/service is not indicated at this time.  Emergency Services Needed?  No     Assignment:  none     Interactive Complexity:  There are four specific communication difficulties that complicate the work of the primary psychiatric procedure.  Interactive complexity (+10393) may be reported when at least one of these difficulties is present.     Communication difficulties present during current the psychiatric procedure include:  None.       Candi Mera, PhD  Licensed Psychologist

## 2025-08-04 ENCOUNTER — VIRTUAL VISIT (OUTPATIENT)
Dept: PSYCHOLOGY | Facility: CLINIC | Age: 30
End: 2025-08-04
Payer: COMMERCIAL

## 2025-08-04 DIAGNOSIS — F33.3 MAJOR DEPRESSIVE DISORDER, RECURRENT, SEVERE WITH PSYCHOTIC FEATURES (H): ICD-10-CM

## 2025-08-04 DIAGNOSIS — F41.1 GENERALIZED ANXIETY DISORDER: ICD-10-CM

## 2025-08-04 DIAGNOSIS — F64.0 GENDER DYSPHORIA IN ADULT: Primary | ICD-10-CM

## 2025-08-04 PROCEDURE — 90834 PSYTX W PT 45 MINUTES: CPT | Mod: 95 | Performed by: MARRIAGE & FAMILY THERAPIST

## 2025-08-11 ENCOUNTER — VIRTUAL VISIT (OUTPATIENT)
Dept: PSYCHOLOGY | Facility: CLINIC | Age: 30
End: 2025-08-11
Payer: COMMERCIAL

## 2025-08-11 DIAGNOSIS — F41.1 GENERALIZED ANXIETY DISORDER: ICD-10-CM

## 2025-08-11 DIAGNOSIS — F64.0 GENDER DYSPHORIA IN ADULT: Primary | ICD-10-CM

## 2025-08-11 DIAGNOSIS — F33.3 MAJOR DEPRESSIVE DISORDER, RECURRENT, SEVERE WITH PSYCHOTIC FEATURES (H): ICD-10-CM

## 2025-08-11 PROCEDURE — 90837 PSYTX W PT 60 MINUTES: CPT | Mod: 95 | Performed by: MARRIAGE & FAMILY THERAPIST

## 2025-08-11 ASSESSMENT — ANXIETY QUESTIONNAIRES
2. NOT BEING ABLE TO STOP OR CONTROL WORRYING: MORE THAN HALF THE DAYS
1. FEELING NERVOUS, ANXIOUS, OR ON EDGE: NEARLY EVERY DAY
7. FEELING AFRAID AS IF SOMETHING AWFUL MIGHT HAPPEN: MORE THAN HALF THE DAYS
GAD7 TOTAL SCORE: 17
8. IF YOU CHECKED OFF ANY PROBLEMS, HOW DIFFICULT HAVE THESE MADE IT FOR YOU TO DO YOUR WORK, TAKE CARE OF THINGS AT HOME, OR GET ALONG WITH OTHER PEOPLE?: VERY DIFFICULT
6. BECOMING EASILY ANNOYED OR IRRITABLE: SEVERAL DAYS
5. BEING SO RESTLESS THAT IT IS HARD TO SIT STILL: NEARLY EVERY DAY
GAD7 TOTAL SCORE: 17
3. WORRYING TOO MUCH ABOUT DIFFERENT THINGS: NEARLY EVERY DAY
GAD7 TOTAL SCORE: 17
IF YOU CHECKED OFF ANY PROBLEMS ON THIS QUESTIONNAIRE, HOW DIFFICULT HAVE THESE PROBLEMS MADE IT FOR YOU TO DO YOUR WORK, TAKE CARE OF THINGS AT HOME, OR GET ALONG WITH OTHER PEOPLE: VERY DIFFICULT
4. TROUBLE RELAXING: NEARLY EVERY DAY
7. FEELING AFRAID AS IF SOMETHING AWFUL MIGHT HAPPEN: MORE THAN HALF THE DAYS

## 2025-08-11 ASSESSMENT — PATIENT HEALTH QUESTIONNAIRE - PHQ9
SUM OF ALL RESPONSES TO PHQ QUESTIONS 1-9: 10
SUM OF ALL RESPONSES TO PHQ QUESTIONS 1-9: 10
10. IF YOU CHECKED OFF ANY PROBLEMS, HOW DIFFICULT HAVE THESE PROBLEMS MADE IT FOR YOU TO DO YOUR WORK, TAKE CARE OF THINGS AT HOME, OR GET ALONG WITH OTHER PEOPLE: SOMEWHAT DIFFICULT

## 2025-08-25 ENCOUNTER — OFFICE VISIT (OUTPATIENT)
Dept: PSYCHOLOGY | Facility: CLINIC | Age: 30
End: 2025-08-25
Payer: COMMERCIAL

## 2025-08-25 DIAGNOSIS — F64.0 GENDER DYSPHORIA IN ADULT: Primary | ICD-10-CM

## 2025-08-25 DIAGNOSIS — F33.3 MAJOR DEPRESSIVE DISORDER, RECURRENT, SEVERE WITH PSYCHOTIC FEATURES (H): ICD-10-CM

## 2025-08-25 DIAGNOSIS — F41.1 GENERALIZED ANXIETY DISORDER: ICD-10-CM

## 2025-08-25 PROCEDURE — 90853 GROUP PSYCHOTHERAPY: CPT | Performed by: PSYCHOLOGIST
